# Patient Record
Sex: MALE | Race: WHITE | Employment: OTHER | URBAN - METROPOLITAN AREA
[De-identification: names, ages, dates, MRNs, and addresses within clinical notes are randomized per-mention and may not be internally consistent; named-entity substitution may affect disease eponyms.]

---

## 2017-01-23 ENCOUNTER — OFFICE VISIT (OUTPATIENT)
Dept: CARDIOLOGY CLINIC | Age: 82
End: 2017-01-23

## 2017-01-23 VITALS
DIASTOLIC BLOOD PRESSURE: 84 MMHG | RESPIRATION RATE: 16 BRPM | HEART RATE: 62 BPM | SYSTOLIC BLOOD PRESSURE: 140 MMHG | BODY MASS INDEX: 26.25 KG/M2 | WEIGHT: 177.2 LBS | HEIGHT: 69 IN | OXYGEN SATURATION: 96 %

## 2017-01-23 DIAGNOSIS — I48.20 CHRONIC ATRIAL FIBRILLATION (HCC): Primary | ICD-10-CM

## 2017-01-23 NOTE — PROGRESS NOTES
Chief Complaint   Patient presents with    Irregular Heart Beat     6 month follow up. Denies chest pain/swelling.   Occasional dypsnea on exertion

## 2017-01-23 NOTE — PROGRESS NOTES
HISTORY OF PRESENT ILLNESS  Wilma Hammond III is a 80 y.o. male. with a past medical history remarkable for prostate cancer diagnosed approximately in [address] [place] [address], for which he has undergone prostatectomy, radiation therapy at the level of the pelvis. He also has a history of left breast cancer, for which he has undergone left mastectomy in 2004, chest radiation and chemotherapy. He denies any previous history of hypertension, hyperlipidemia, diabetes or coronary artery disease. He was seen in the hospital at [facility] in May 2008 for dizziness. He was found to have atrial fibrillation. On account of that, he underwent JOHN followed by DC cardioversion. To be noted that the ejection fraction was estimated at 60% at that time. He only had trace to mild mitral regurgitation and tricuspid regurgitation. He converted back to normal sinus rhythm promptly after cardioversion. He has had recurrence of atrial fibrillation and has persistent atrial fibrillation. Echo on 4/64: Systolic function was normal. Ejection fraction was  estimated in the range of 60 % to 65 %. There were no regional wall motion  abnormalities. Left atrium: The atrium was mildly dilated. Mitral valve: There was mild annular calcification. Tricuspid valve: There was mild regurgitation.     He presents today for follow up. HPI  No complaints except hematuria evaluated in [place] where he lives during the winter  No cp or sob   No recurrent dizziness  Review of Systems   Respiratory: Negative. Cardiovascular: Negative. Visit Vitals    /84 (BP 1 Location: Right arm, BP Patient Position: Sitting)    Pulse 62    Resp 16    Ht 5' 8.5\" (1.74 m)    Wt 80.4 kg (177 lb 3.2 oz)    SpO2 96%    BMI 26.55 kg/m2       Physical Exam   Neck: No JVD present. Carotid bruit is not present. Cardiovascular: Normal rate. An irregularly irregular rhythm present. Pulmonary/Chest: Effort normal and breath sounds normal.   Abdominal: Soft. Musculoskeletal: He exhibits no edema. Psychiatric: He has a normal mood and affect. Current Outpatient Prescriptions on File Prior to Visit   Medication Sig Dispense Refill    tamoxifen (NOLVADEX) 20 mg tablet Take 20 mg by mouth daily.  apixaban (ELIQUIS) 5 mg tablet Take 5 mg by mouth two (2) times a day.  CYANOCOBALAMIN, VITAMIN B-12, (VITAMIN B-12 PO) Take  by mouth daily.  digoxin (LANOXIN) 0.25 mg tablet TAKE 1 TABLET BY MOUTH EVERY DAY 90 Tab 3    cholecalciferol (VITAMIN D3) 1,000 unit tablet Take 400 Units by mouth daily.  MULTIVITAMIN (MULTIPLE VITAMINS PO) Take  by mouth. No current facility-administered medications on file prior to visit. ASSESSMENT and PLAN  AF: persistent , rate is controlled on digoxin. nNormal EF by echo of 9/14. stress test on the back burner for now in this asymptomatic patient  HTN: well controlled in this office. His log reviewed and it is a bit elevated 150-160, his mow on eliquis since very difficult to control his inr on digoxin.  Episode of hematuria several weeks ago of concern but apparently w/u in [place] was normal. Obtain bmp to make sure no changes in eliquis dose needed  Also obtain digoxin level  His ecg shows AF and nstt and no changes from previous  HTN: reasonably controlled at this time  no further issues with low BP and dizziness, drinking daily gatorades   HLD: closely followed by her PCP  See him back in 6 months or sooner if any issues and will call labs results by phone he is in fact going back to [place] for the winter  Hold off echo on this asymptomatic patient

## 2017-01-24 ENCOUNTER — HOSPITAL ENCOUNTER (OUTPATIENT)
Dept: LAB | Age: 82
Discharge: HOME | End: 2017-01-24
Payer: MEDICARE

## 2017-01-24 PROCEDURE — 80162 ASSAY OF DIGOXIN TOTAL: CPT

## 2017-01-24 PROCEDURE — 80048 BASIC METABOLIC PNL TOTAL CA: CPT

## 2017-01-24 PROCEDURE — 36415 COLL VENOUS BLD VENIPUNCTURE: CPT

## 2017-01-25 LAB
BUN SERPL-MCNC: 14 MG/DL (ref 8–27)
BUN/CREAT SERPL: 12 (ref 10–22)
CALCIUM SERPL-MCNC: 8.8 MG/DL (ref 8.6–10.2)
CHLORIDE SERPL-SCNC: 103 MMOL/L (ref 96–106)
CO2 SERPL-SCNC: 24 MMOL/L (ref 18–29)
CREAT SERPL-MCNC: 1.13 MG/DL (ref 0.76–1.27)
DIGOXIN SERPL-MCNC: 0.5 NG/ML
GLUCOSE SERPL-MCNC: 98 MG/DL (ref 65–99)
INTERPRETATION: NORMAL
POTASSIUM SERPL-SCNC: 4.6 MMOL/L (ref 3.5–5.2)
SODIUM SERPL-SCNC: 143 MMOL/L (ref 134–144)

## 2017-01-27 ENCOUNTER — TELEPHONE (OUTPATIENT)
Dept: CARDIOLOGY CLINIC | Age: 82
End: 2017-01-27

## 2017-01-27 NOTE — TELEPHONE ENCOUNTER
Verified patient with two patient identifiers. Spoke with patient lab results given. Per Kaleigh Dorsey continue same RX.

## 2017-04-28 ENCOUNTER — HOSPITAL ENCOUNTER (OUTPATIENT)
Dept: PET IMAGING | Age: 82
Discharge: HOME | End: 2017-04-28
Attending: INTERNAL MEDICINE
Payer: MEDICARE

## 2017-04-28 VITALS — BODY MASS INDEX: 25.76 KG/M2 | WEIGHT: 170 LBS | HEIGHT: 68 IN

## 2017-04-28 DIAGNOSIS — C50.929 MALIGNANT NEOPLASM OF MALE BREAST (HCC): ICD-10-CM

## 2017-04-28 PROCEDURE — A9552 F18 FDG: HCPCS

## 2017-04-28 RX ORDER — SODIUM CHLORIDE 0.9 % (FLUSH) 0.9 %
10 SYRINGE (ML) INJECTION
Status: COMPLETED | OUTPATIENT
Start: 2017-04-28 | End: 2017-04-28

## 2017-04-28 RX ADMIN — Medication 10 ML: at 10:45

## 2017-05-01 ENCOUNTER — TELEPHONE (OUTPATIENT)
Dept: CARDIOLOGY CLINIC | Age: 82
End: 2017-05-01

## 2017-05-01 NOTE — TELEPHONE ENCOUNTER
Prior Authorization obtained for digoxin 0.25mg daily. Per [place], approved for 1 year, May 1-2017 to May 1, 2018. Case # [id number]. Informed Madelyn at [facility]. Spoke to patient. Identifiers x 2. Informed that prescription has been approved.

## 2017-07-21 ENCOUNTER — OFFICE VISIT (OUTPATIENT)
Dept: CARDIOLOGY CLINIC | Age: 82
End: 2017-07-21

## 2017-07-21 VITALS
HEIGHT: 68 IN | RESPIRATION RATE: 20 BRPM | DIASTOLIC BLOOD PRESSURE: 68 MMHG | WEIGHT: 174 LBS | HEART RATE: 72 BPM | BODY MASS INDEX: 26.37 KG/M2 | SYSTOLIC BLOOD PRESSURE: 120 MMHG | OXYGEN SATURATION: 94 %

## 2017-07-21 DIAGNOSIS — I48.20 CHRONIC ATRIAL FIBRILLATION (HCC): Primary | ICD-10-CM

## 2017-07-21 NOTE — PROGRESS NOTES
Patient is here for f/u on AFIB.   Visit Vitals    /68 (BP 1 Location: Right arm, BP Patient Position: Supine)    Pulse 72    Resp 20    Ht 5' 8\" (1.727 m)    Wt 174 lb (78.9 kg)    SpO2 94%    BMI 26.46 kg/m2

## 2017-07-21 NOTE — MR AVS SNAPSHOT
Visit Information Date & Time Provider Department Dept. Phone Encounter #  
 7/21/2017  2:00 PM Shirley Luna MD [facility] Lorene Acosta [phone] [id number] Follow-up Instructions Return in about 6 months (around 1/21/2018). Follow-up and Disposition History Upcoming Health Maintenance Date Due DTaP/Tdap/Td series (1 - Tdap) 6/5/1953 GLAUCOMA SCREENING Q2Y 6/5/1997 MEDICARE YEARLY EXAM 11/6/2015 INFLUENZA AGE 9 TO ADULT 8/1/2017 Allergies as of 7/21/2017  Review Complete On: 7/21/2017 By: Shirley Luna MD  
 No Known Allergies Current Immunizations  Reviewed on 11/16/2016 Name Date Influenza High Dose Vaccine PF 11/3/2016 Influenza Vaccine 10/7/2013 Influenza Vaccine Split 10/7/2011 Pneumococcal Conjugate (PCV-13) 11/27/2015 Pneumococcal Vaccine (Unspecified Type) 11/13/2003, 4/29/1998 Not reviewed this visit You Were Diagnosed With   
  
 Codes Comments Chronic atrial fibrillation (HCC)    -  Primary ICD-10-CM: L93.0 ICD-9-CM: 427.31 Vitals BP Pulse Resp Height(growth percentile) Weight(growth percentile) SpO2  
 120/68 (BP 1 Location: Right arm, BP Patient Position: Supine) 72 20 5' 8\" (1.727 m) 174 lb (78.9 kg) 94% BMI Smoking Status 26.46 kg/m2 Former Smoker Vitals History BMI and BSA Data Body Mass Index Body Surface Area  
 26.46 kg/m 2 1.95 m 2 Preferred Pharmacy Pharmacy Name Phone [facility] [address], [address] [place] [address] [place] [facility] [address] [place] [address] Road [phone] Your Updated Medication List  
  
   
This list is accurate as of: 7/21/17  2:59 PM.  Always use your most recent med list.  
  
  
  
  
 * digoxin 0.25 mg tablet Commonly known as:  LANOXIN  
TAKE 1 TABLET BY MOUTH EVERY DAY  
  
 * DIGOX 0.25 mg tablet Generic drug:  digoxin TAKE 1 TABLET BY MOUTH DAILY * ELIQUIS 5 mg tablet Generic drug:  apixaban Take 5 mg by mouth two (2) times a day. * ELIQUIS 5 mg tablet Generic drug:  apixaban TAKE 1 TABLET BY MOUTH TWICE DAILY MULTIPLE VITAMINS PO Take  by mouth. tamoxifen 20 mg tablet Commonly known as:  NOLVADEX Take 20 mg by mouth daily. VITAMIN B-12 PO Take  by mouth daily. VITAMIN D3 1,000 unit tablet Generic drug:  cholecalciferol Take 400 Units by mouth daily. * Notice: This list has 4 medication(s) that are the same as other medications prescribed for you. Read the directions carefully, and ask your doctor or other care provider to review them with you. We Performed the Following AMB POC EKG ROUTINE W/ 12 LEADS, INTER & REP [31665 CPT(R)] AMB POC EKG ROUTINE W/ 12 LEADS, INTER & REP [11404 CPT(R)] Follow-up Instructions Return in about 6 months (around 1/21/2018). Patient Instructions Follow up with Dr. Sienna Go in 6 months. Cardiac clearance note has been sent to urologist.   
 
 
  
Introducing \[facility]\"" & HEALTH SERVICES! Chrissie Gomez introduces [employer] patient portal. Now you can access parts of your medical record, email your doctor's office, and request medication refills online. 1. In your internet browser, go to [email or web address] 2. Click on the First Time User? Click Here link in the Sign In box. You will see the New Member Sign Up page. 3. Enter your [employer] Access Code exactly as it appears below. You will not need to use this code after youve completed the sign-up process. If you do not sign up before the expiration date, you must request a new code. · [employer] Access Code: [email or web address] Expires: 7/27/2017 10:08 AM 
 
4. Enter the last four digits of your Social Security Number (xxxx) and Date of Birth (mm/dd/yyyy) as indicated and click Submit. You will be taken to the next sign-up page. 5. Create a [employer] ID.  This will be your [employer] login ID and cannot be changed, so think of one that is secure and easy to remember. 6. Create a [employer] password. You can change your password at any time. 7. Enter your Password Reset Question and Answer. This can be used at a later time if you forget your password. 8. Enter your e-mail address. You will receive e-mail notification when new information is available in [address] 9. Click Sign Up. You can now view and download portions of your medical record. 10. Click the Download Summary menu link to download a portable copy of your medical information. If you have questions, please visit the Frequently Asked Questions section of the [employer] website. Remember, [employer] is NOT to be used for urgent needs. For medical emergencies, dial 911. Now available from your iPhone and Android! Please provide this summary of care documentation to your next provider. Your primary care clinician is listed as Maame Cardoso. If you have any questions after today's visit, please call [phone].

## 2017-07-21 NOTE — PROGRESS NOTES
HISTORY OF PRESENT ILLNESS  Rajat Thomas is a 80 y.o. male. with a past medical history remarkable for prostate cancer diagnosed approximately in [address] [place] [address], for which he has undergone prostatectomy, radiation therapy at the level of the pelvis. He also has a history of left breast cancer, for which he has undergone left mastectomy in 2004, chest radiation and chemotherapy. He denies any previous history of hypertension, hyperlipidemia, diabetes or coronary artery disease. He was seen in the hospital at [facility] in May 2008 for dizziness. He was found to have atrial fibrillation. On account of that, he underwent JOHN followed by DC cardioversion. To be noted that the ejection fraction was estimated at 60% at that time. He only had trace to mild mitral regurgitation and tricuspid regurgitation. He converted back to normal sinus rhythm promptly after cardioversion. He has had recurrence of atrial fibrillation and has persistent atrial fibrillation. Echo on 1/81: Systolic function was normal. Ejection fraction was  estimated in the range of 60 % to 65 %. There were no regional wall motion  abnormalities. Left atrium: The atrium was mildly dilated. Mitral valve: There was mild annular calcification. Tricuspid valve: There was mild regurgitation.   Past Medical History:   Diagnosis Date    Atrial fibrillation ([address]     Breast cancer ([address] 7/26/2011    Breast cancer, male ([facility] 7/5/2012    Colon polyps 7/26/2011    Glucose intolerance (impaired glucose tolerance) 10/31/2013    Prostate cancer ([address] 7/26/2011    Typhoid fever 7/26/2011     Past Surgical History:   Procedure Laterality Date    HX CATARACT REMOVAL      HX MASTECTOMY      HX POLYPECTOMY      HX PROSTATECTOMY      HX TONSILLECTOMY       Social History   Substance Use Topics    Smoking status: Former Smoker     Packs/day: 2.00    Smokeless tobacco: Never Used    Alcohol use 3.5 oz/week     7 drink(s) per week      Comment: A drink at night       HPI  Doing well cardiac wise still some hematuria reported and now scheduled for cystoscopy  No cp or sob reported  Review of Systems   Constitutional: Negative. Respiratory: Negative. Cardiovascular: Negative. Genitourinary: Positive for hematuria. Visit Vitals    /68 (BP 1 Location: Right arm, BP Patient Position: Supine)    Pulse 72    Resp 20    Ht 5' 8\" (1.727 m)    Wt 78.9 kg (174 lb)    SpO2 94%    BMI 26.46 kg/m2       Physical Exam   Neck: No JVD present. Carotid bruit is not present. Cardiovascular: Normal rate. An irregularly irregular rhythm present. Pulmonary/Chest: Effort normal and breath sounds normal.   Abdominal: Soft. Musculoskeletal: He exhibits no edema. Psychiatric: He has a normal mood and affect. Current Outpatient Prescriptions on File Prior to Visit   Medication Sig Dispense Refill    ELIQUIS 5 mg tablet TAKE 1 TABLET BY MOUTH TWICE DAILY 60 Tab 6    tamoxifen (NOLVADEX) 20 mg tablet Take 20 mg by mouth daily.  CYANOCOBALAMIN, VITAMIN B-12, (VITAMIN B-12 PO) Take  by mouth daily.  digoxin (LANOXIN) 0.25 mg tablet TAKE 1 TABLET BY MOUTH EVERY DAY 90 Tab 3    cholecalciferol (VITAMIN D3) 1,000 unit tablet Take 400 Units by mouth daily.  MULTIVITAMIN (MULTIPLE VITAMINS PO) Take  by mouth.  DIGOX 250 mcg tablet TAKE 1 TABLET BY MOUTH DAILY 90 Tab 3    apixaban (ELIQUIS) 5 mg tablet Take 5 mg by mouth two (2) times a day. No current facility-administered medications on file prior to visit.       Lab Results   Component Value Date/Time    Sodium 143 01/24/2017 08:29 AM    Potassium 4.6 01/24/2017 08:29 AM    Chloride 103 01/24/2017 08:29 AM    CO2 24 01/24/2017 08:29 AM    Glucose 98 01/24/2017 08:29 AM    BUN 14 01/24/2017 08:29 AM    Creatinine 1.13 01/24/2017 08:29 AM    BUN/Creatinine ratio 12 01/24/2017 08:29 AM    GFR est AA 69 01/24/2017 08:29 AM    GFR est non-AA 59 01/24/2017 08:29 AM    Calcium 8.8 01/24/2017 08:29 AM       ASSESSMENT and PLAN  AF: persistent , rate is controlled on digoxin. Normal EF by echo of 9/14. stress test and echo  on the back burner for now in this asymptomatic patient  HTN: well controlled in this office.  Also obtain digoxin level  His ecg shows AF and nstt and no changes from previous  HTN: reasonably controlled at this time no further issues with low BP and dizziness, drinking daily gatorades   HLD: closely followed by her PCP  Hematuria: patient is at an acceptable cardiac risk to proceed with cystoscopy as planned may stop eliquis for 48 hours prior to procedure for then resuming it soon after or next day  AAA: 3.2 cm aaa by ct scan will need re checking in 6 months  Obtain bmp and digoxin level at the next OV  See him back in 6 months

## 2017-08-19 ENCOUNTER — HOSPITAL ENCOUNTER (EMERGENCY)
Age: 82
Discharge: HOME | End: 2017-08-19
Attending: EMERGENCY MEDICINE
Payer: MEDICARE

## 2017-08-19 VITALS
SYSTOLIC BLOOD PRESSURE: 155 MMHG | RESPIRATION RATE: 18 BRPM | WEIGHT: 174.4 LBS | HEIGHT: 68 IN | BODY MASS INDEX: 26.43 KG/M2 | TEMPERATURE: 98.1 F | HEART RATE: 80 BPM | DIASTOLIC BLOOD PRESSURE: 86 MMHG | OXYGEN SATURATION: 96 %

## 2017-08-19 DIAGNOSIS — R31.9 HEMATURIA: Primary | ICD-10-CM

## 2017-08-19 LAB
ALBUMIN SERPL-MCNC: 3.8 G/DL (ref 3.5–5)
ALBUMIN/GLOB SERPL: 1.2 {RATIO} (ref 1.1–2.2)
ALP SERPL-CCNC: 48 U/L (ref 45–117)
ALT SERPL-CCNC: 18 U/L (ref 12–78)
ANION GAP SERPL CALC-SCNC: 8 MMOL/L (ref 5–15)
APPEARANCE UR: ABNORMAL
AST SERPL-CCNC: 11 U/L (ref 15–37)
BACTERIA URNS QL MICRO: ABNORMAL /HPF
BASOPHILS # BLD: 0 K/UL (ref 0–0.1)
BASOPHILS NFR BLD: 0 % (ref 0–1)
BILIRUB SERPL-MCNC: 0.6 MG/DL (ref 0.2–1)
BILIRUB UR QL: NEGATIVE
BUN SERPL-MCNC: 19 MG/DL (ref 6–20)
BUN/CREAT SERPL: 17 (ref 12–20)
CALCIUM SERPL-MCNC: 8.3 MG/DL (ref 8.5–10.1)
CHLORIDE SERPL-SCNC: 105 MMOL/L (ref 97–108)
CO2 SERPL-SCNC: 28 MMOL/L (ref 21–32)
COLOR UR: ABNORMAL
CREAT SERPL-MCNC: 1.1 MG/DL (ref 0.7–1.3)
DIFFERENTIAL METHOD BLD: ABNORMAL
EOSINOPHIL # BLD: 0.1 K/UL (ref 0–0.4)
EOSINOPHIL NFR BLD: 3 % (ref 0–7)
EPITH CASTS URNS QL MICRO: ABNORMAL /LPF
ERYTHROCYTE [DISTWIDTH] IN BLOOD BY AUTOMATED COUNT: 13.1 % (ref 11.5–14.5)
GLOBULIN SER CALC-MCNC: 3.1 G/DL (ref 2–4)
GLUCOSE SERPL-MCNC: 99 MG/DL (ref 65–100)
GLUCOSE UR STRIP.AUTO-MCNC: NEGATIVE MG/DL
HCT VFR BLD AUTO: 42.1 % (ref 36.6–50.3)
HGB BLD-MCNC: 14.4 G/DL (ref 12.1–17)
HGB UR QL STRIP: ABNORMAL
KETONES UR QL STRIP.AUTO: NEGATIVE MG/DL
LEUKOCYTE ESTERASE UR QL STRIP.AUTO: ABNORMAL
LYMPHOCYTES # BLD: 0.7 K/UL (ref 0.8–3.5)
LYMPHOCYTES NFR BLD: 21 % (ref 12–49)
MCH RBC QN AUTO: 37.2 PG (ref 26–34)
MCHC RBC AUTO-ENTMCNC: 34.2 G/DL (ref 30–36.5)
MCV RBC AUTO: 108.8 FL (ref 80–99)
MONOCYTES # BLD: 0.5 K/UL (ref 0–1)
MONOCYTES NFR BLD: 15 % (ref 5–13)
NEUTS SEG # BLD: 2.1 K/UL (ref 1.8–8)
NEUTS SEG NFR BLD: 61 % (ref 32–75)
NITRITE UR QL STRIP.AUTO: NEGATIVE
PH UR STRIP: 6 [PH] (ref 5–8)
PLATELET # BLD AUTO: 129 K/UL (ref 150–400)
POTASSIUM SERPL-SCNC: 4.2 MMOL/L (ref 3.5–5.1)
PROT SERPL-MCNC: 6.9 G/DL (ref 6.4–8.2)
PROT UR STRIP-MCNC: 300 MG/DL
RBC # BLD AUTO: 3.87 M/UL (ref 4.1–5.7)
RBC #/AREA URNS HPF: >100 /HPF (ref 0–5)
RBC MORPH BLD: ABNORMAL
RBC MORPH BLD: ABNORMAL
SODIUM SERPL-SCNC: 141 MMOL/L (ref 136–145)
SP GR UR REFRACTOMETRY: 1.01 (ref 1–1.03)
UROBILINOGEN UR QL STRIP.AUTO: 0.2 EU/DL (ref 0.2–1)
WBC # BLD AUTO: 3.4 K/UL (ref 4.1–11.1)
WBC URNS QL MICRO: ABNORMAL /HPF (ref 0–4)

## 2017-08-19 PROCEDURE — 80053 COMPREHEN METABOLIC PANEL: CPT | Performed by: EMERGENCY MEDICINE

## 2017-08-19 PROCEDURE — 51700 IRRIGATION OF BLADDER: CPT

## 2017-08-19 PROCEDURE — 99283 EMERGENCY DEPT VISIT LOW MDM: CPT

## 2017-08-19 PROCEDURE — 74011250637 HC RX REV CODE- 250/637: Performed by: EMERGENCY MEDICINE

## 2017-08-19 PROCEDURE — 74011000250 HC RX REV CODE- 250: Performed by: EMERGENCY MEDICINE

## 2017-08-19 PROCEDURE — 36415 COLL VENOUS BLD VENIPUNCTURE: CPT | Performed by: EMERGENCY MEDICINE

## 2017-08-19 PROCEDURE — 77030005546 HC CATH URETH FOL 3W BARD -A

## 2017-08-19 PROCEDURE — 51702 INSERT TEMP BLADDER CATH: CPT

## 2017-08-19 PROCEDURE — 81001 URINALYSIS AUTO W/SCOPE: CPT | Performed by: EMERGENCY MEDICINE

## 2017-08-19 PROCEDURE — 85025 COMPLETE CBC W/AUTO DIFF WBC: CPT | Performed by: EMERGENCY MEDICINE

## 2017-08-19 RX ORDER — ACETAMINOPHEN 325 MG/1
650 TABLET ORAL ONCE
Status: COMPLETED | OUTPATIENT
Start: 2017-08-19 | End: 2017-08-19

## 2017-08-19 RX ORDER — LIDOCAINE HYDROCHLORIDE 20 MG/ML
JELLY TOPICAL ONCE
Status: COMPLETED | OUTPATIENT
Start: 2017-08-19 | End: 2017-08-19

## 2017-08-19 RX ADMIN — LIDOCAINE HYDROCHLORIDE: 20 JELLY TOPICAL at 07:56

## 2017-08-19 RX ADMIN — ACETAMINOPHEN 650 MG: 325 TABLET, FILM COATED ORAL at 07:56

## 2017-08-19 NOTE — ED PROVIDER NOTES
HPI Comments: 59-year-old male presents with complaints of gross hematuria starting at 4 AM. Patient is 4 days status post cystoscopy and biopsy by Dr. Carlos Fernandez. Patient is on eliquis. Denies bleeding elsewhere. Denies fever, chills. Denies abdominal pain chest pain, shortness of breath. Denies trouble with urination. Primary care physician-desirae  Urologist-Osmani  Former smoker  Regular alcohol use  Denies drug use    The history is provided by the patient. Past Medical History:   Diagnosis Date    Atrial fibrillation ([facility])     Breast cancer ([facility]) 7/26/2011    Breast cancer, male ([facility]) 7/5/2012    Colon polyps 7/26/2011    Glucose intolerance (impaired glucose tolerance) 10/31/2013    Prostate cancer ([facility]) 7/26/2011    Typhoid fever 7/26/2011       Past Surgical History:   Procedure Laterality Date    HX CATARACT REMOVAL      HX MASTECTOMY      HX POLYPECTOMY      HX PROSTATECTOMY      HX TONSILLECTOMY           History reviewed. No pertinent family history. Social History     Social History    Marital status:      Spouse name: N/A    Number of children: N/A    Years of education: N/A     Occupational History    Not on file. Social History Main Topics    Smoking status: Former Smoker     Packs/day: 2.00    Smokeless tobacco: Never Used    Alcohol use 3.5 oz/week     7 drink(s) per week      Comment: A drink at night    Drug use: No    Sexual activity: Not on file     Other Topics Concern    Not on file     Social History Narrative         ALLERGIES: Review of patient's allergies indicates no known allergies. Review of Systems   Constitutional: Negative for chills and fever. HENT: Negative for congestion, nosebleeds and sore throat. Eyes: Negative for pain and discharge. Respiratory: Negative for cough and shortness of breath. Cardiovascular: Negative for chest pain and palpitations.    Gastrointestinal: Negative for abdominal pain, constipation, nausea and vomiting. Genitourinary: Positive for dysuria and hematuria. Negative for decreased urine volume, flank pain and urgency. Musculoskeletal: Negative for gait problem and myalgias. Skin: Negative for rash and wound. Neurological: Negative for seizures and syncope. Hematological: Does not bruise/bleed easily. Psychiatric/Behavioral: Negative for confusion, self-injury and suicidal ideas. Vitals:    08/19/17 0531 08/19/17 0603   BP: (!) 169/94 152/74   Pulse: 81    Resp: 18    Temp: 98 °F (36.7 °C)    SpO2: 99%    Weight: 79.1 kg (174 lb 6.4 oz)    Height: 5' 8\" (1.727 m)             Physical Exam   Constitutional: He is oriented to person, place, and time. He appears well-developed and well-nourished. HENT:   Head: Normocephalic and atraumatic. Eyes: EOM are normal. Pupils are equal, round, and reactive to light. Neck: Normal range of motion. Neck supple. Cardiovascular: Normal rate, regular rhythm, normal heart sounds and intact distal pulses. Pulmonary/Chest: Effort normal and breath sounds normal. No respiratory distress. He has no wheezes. Abdominal: Soft. Bowel sounds are normal. There is no tenderness. There is no rebound and no guarding. Musculoskeletal: Normal range of motion. Neurological: He is alert and oriented to person, place, and time. Skin: Skin is warm and dry. Psychiatric: He has a normal mood and affect. His behavior is normal.   Nursing note and vitals reviewed. MDM  Number of Diagnoses or Management Options  Hematuria:   Diagnosis management comments: 80-year-old male with history of prostate cancer presents with complaints of gross hematuria 4 days status post bladder biopsy. Patient is well-appearing, in no acute distress, hemodynamically stable, afebrile. Plan-bladder irrigation, CBC/CMP/UA.     Labs unremarkable       Amount and/or Complexity of Data Reviewed  Clinical lab tests: ordered and reviewed  Discuss the patient with other providers: yes    Risk of Complications, Morbidity, and/or Mortality  Presenting problems: moderate  Diagnostic procedures: moderate  Management options: moderate    Patient Progress  Patient progress: improved    ED Course       Procedures     0700  Bleeding resolved on placement of bush and irrigation. 7:29 AM  Ivan Salmeron MD spoke with Dr. Rebekah Downing, Consult for Urology. Discussed available diagnostic tests and clinical findings. He/She is in agreement with care plans as outlined. He/she advises dc with catheter in place and follow up in office this morning for re-eval.      Patient's results have been reviewed with them. Patient and/or family have verbally conveyed their understanding and agreement of the patient's signs, symptoms, diagnosis, treatment and prognosis and additionally agree to follow up as recommended or return to the Emergency Room should their condition change prior to follow-up. Discharge instructions have also been provided to the patient with some educational information regarding their diagnosis as well a list of reasons why they would want to return to the ER prior to their follow-up appointment should their condition change.

## 2017-08-19 NOTE — ED NOTES
Inserted 3 way bush to irrigate bladder. Patient draining light pink/yellow urine. Small clots noted.

## 2017-08-19 NOTE — DISCHARGE INSTRUCTIONS
Blood in the Urine: Care Instructions  Your Care Instructions  Blood in the urine, or hematuria, may make the urine look red, brown, or pink. There may be blood every time you urinate or just from time to time. You cannot always see blood in the urine, but it will show up in a urine test.  Blood in the urine may be serious. It should always be checked by a doctor. Your doctor may recommend more tests, including an X-ray, a CT scan, or a cystoscopy (which lets a doctor look inside the urethra and bladder). Blood in the urine can be a sign of another problem. Common causes are bladder infections and kidney stones. An injury to your groin or your genital area can also cause bleeding in the urinary tract. Very hard exercise--such as running a marathon--can cause blood in the urine. Blood in the urine can also be a sign of kidney disease or cancer in the bladder or kidney. Many cases of blood in the urine are caused by a harmless condition that runs in families. This is called benign familial hematuria. It does not need any treatment. Sometimes your urine may look red or brown even though it does not contain blood. For example, not getting enough fluids (dehydration), taking certain medicines, or having a liver problem can change the color of your urine. Eating foods such as beets, rhubarb, or blackberries or foods with red food coloring can make your urine look red or pink. Follow-up care is a key part of your treatment and safety. Be sure to make and go to all appointments, and call your doctor if you are having problems. It's also a good idea to know your test results and keep a list of the medicines you take. When should you call for help? Call your doctor now or seek immediate medical care if:  · You have symptoms of a urinary infection. For example:  ¨ You have pus in your urine. ¨ You have pain in your back just below your rib cage. This is called flank pain.   ¨ You have a fever, chills, or body aches.  ¨ It hurts to urinate. ¨ You have groin or belly pain. · You have more blood in your urine. Watch closely for changes in your health, and be sure to contact your doctor if:  · You have new urination problems. · You do not get better as expected. Where can you learn more? Go to http://héctor-parker.info/. Enter A659 in the search box to learn more about \"Blood in the Urine: Care Instructions. \"  Current as of: March 20, 2017  Content Version: 11.3  © 5047-2161 [employer]. Care instructions adapted under license by [employer] (which disclaims liability or warranty for this information). If you have questions about a medical condition or this instruction, always ask your healthcare professional. [place] [address] any warranty or liability for your use of this information. We hope that we have addressed all of your medical concerns. The examination and treatment you received in the Emergency Department were for an emergent problem and were not intended as complete care. It is important that you follow up with your healthcare provider(s) for ongoing care. If your symptoms worsen or do not improve as expected, and you are unable to reach your usual health care provider(s), you should return to the Emergency Department. Today's healthcare is undergoing tremendous change, and patient satisfaction surveys are one of the many tools to assess the quality of medical care. You may receive a survey from the [employer] organization regarding your experience in the Emergency Department. I hope that your experience has been completely positive, particularly the medical care that I provided. As such, please participate in the survey; anything less than excellent does not meet my expectations or intentions. [address] [facility] and [address] [place] [address] participate in nationally recognized quality of care measures.   If your blood pressure is greater than 120/80, as reported below, we urge that you seek medical care to address the potential of high blood pressure, commonly known as hypertension. Hypertension can be hereditary or can be caused by certain medical conditions, pain, stress, or \"white coat syndrome. \"       Please make an appointment with your health care provider(s) for follow up of your Emergency Department visit. VITALS:   Patient Vitals for the past 8 hrs:   Temp Pulse Resp BP SpO2   08/19/17 0730 98.1 °F (36.7 °C) 80 18 155/86 96 %   08/19/17 0603 - - - 152/74 -   08/19/17 0531 98 °F (36.7 °C) 81 18 (!) 169/94 99 %          Thank you for allowing us to provide you with medical care today. We realize that you have many choices for your emergency care needs. Please choose us in the future for any continued health care needs. Debbie Fuentes  [address]   Office: [phone]            Recent Results (from the past 24 hour(s))   URINALYSIS W/ RFLX MICROSCOPIC    Collection Time: 08/19/17  6:03 AM   Result Value Ref Range    Color RED      Appearance BLOODY (A) CLEAR      Specific gravity 1.015 1.003 - 1.030      pH (UA) 6.0 5.0 - 8.0      Protein 300 (A) NEG mg/dL    Glucose NEGATIVE  NEG mg/dL    Ketone NEGATIVE  NEG mg/dL    Bilirubin NEGATIVE  NEG      Blood LARGE (A) NEG      Urobilinogen 0.2 0.2 - 1.0 EU/dL    Nitrites NEGATIVE  NEG      Leukocyte Esterase TRACE (A) NEG     CBC WITH AUTOMATED DIFF    Collection Time: 08/19/17  6:03 AM   Result Value Ref Range    WBC 3.4 (L) 4.1 - 11.1 K/uL    RBC 3.87 (L) 4.10 - 5.70 M/uL    HGB 14.4 12.1 - 17.0 g/dL    HCT 42.1 36.6 - 50.3 %    .8 (H) 80.0 - 99.0 FL    MCH 37.2 (H) 26.0 - 34.0 PG    MCHC 34.2 30.0 - 36.5 g/dL    RDW 13.1 11.5 - 14.5 %    PLATELET 740 (L) 494 - 400 K/uL    NEUTROPHILS 61 32 - 75 %    LYMPHOCYTES 21 12 - 49 %    MONOCYTES 15 (H) 5 - 13 %    EOSINOPHILS 3 0 - 7 %    BASOPHILS 0 0 - 1 %    ABS. NEUTROPHILS 2.1 1.8 - 8.0 K/UL    ABS. LYMPHOCYTES 0.7 (L) 0.8 - 3.5 K/UL    ABS. MONOCYTES 0.5 0.0 - 1.0 K/UL    ABS. EOSINOPHILS 0.1 0.0 - 0.4 K/UL    ABS. BASOPHILS 0.0 0.0 - 0.1 K/UL    DF SMEAR SCANNED      RBC COMMENTS MACROCYTOSIS  1+        RBC COMMENTS OVALOCYTES  PRESENT       METABOLIC PANEL, COMPREHENSIVE    Collection Time: 08/19/17  6:03 AM   Result Value Ref Range    Sodium 141 136 - 145 mmol/L    Potassium 4.2 3.5 - 5.1 mmol/L    Chloride 105 97 - 108 mmol/L    CO2 28 21 - 32 mmol/L    Anion gap 8 5 - 15 mmol/L    Glucose 99 65 - 100 mg/dL    BUN 19 6 - 20 MG/DL    Creatinine 1.10 0.70 - 1.30 MG/DL    BUN/Creatinine ratio 17 12 - 20      GFR est AA >60 >60 ml/min/1.73m2    GFR est non-AA >60 >60 ml/min/1.73m2    Calcium 8.3 (L) 8.5 - 10.1 MG/DL    Bilirubin, total 0.6 0.2 - 1.0 MG/DL    ALT (SGPT) 18 12 - 78 U/L    AST (SGOT) 11 (L) 15 - 37 U/L    Alk. phosphatase 48 45 - 117 U/L    Protein, total 6.9 6.4 - 8.2 g/dL    Albumin 3.8 3.5 - 5.0 g/dL    Globulin 3.1 2.0 - 4.0 g/dL    A-G Ratio 1.2 1.1 - 2.2     URINE MICROSCOPIC ONLY    Collection Time: 08/19/17  6:03 AM   Result Value Ref Range    WBC 5-10 0 - 4 /hpf    RBC >100 (H) 0 - 5 /hpf    Epithelial cells FEW FEW /lpf    Bacteria 1+ (A) NEG /hpf       No results found. Learning About Urinary Catheter Care to Prevent Infection  What is a urinary catheter? A urinary catheter is a flexible plastic tube used to drain urine from your bladder when you can't urinate on your own. The catheter allows urine to drain from the bladder into a bag. Two types of drainage bags may be used with a urinary catheter. · A bedside bag is a large bag that you can hang on the side of your bed or on a chair. You can use it overnight or anytime you will be sitting or lying down for a long time. · A leg bag is a small bag that you can use during the day. It is usually attached to your thigh or calf and hidden under your clothes.   Having a urinary catheter increases your risk of getting a urinary tract infection. Germs may get on the catheter and cause an infection in your bladder or kidneys. The longer you have a catheter, the more likely it is that you will get an infection. You can help prevent this problem with good hygiene and careful handling of your catheter and drainage bags. How can you help prevent infection? Take care to be clean  · Always wash your hands well before and after you handle your catheter. · Clean the skin around the catheter twice a day using soap and water. Dry with a clean towel afterward. You can shower with your catheter and drainage bag in place unless your doctor told you not to. · When you clean around the catheter, check the surrounding skin for signs of infection. Look for things like pus or irritated, swollen, red, or tender skin around the catheter. Be careful with your drainage bag  · Always keep the drainage bag below the level of your bladder. This will help keep urine from flowing back into your bladder. · Check often to see that urine is flowing through the catheter into the drainage bag. · Empty the drainage bag when it is half full. This will keep it from overflowing or backing up. · When you empty the drainage bag, do not let the tubing or drain spout touch anything. Be careful with your catheter  · Do not unhook the catheter from the drain tube. That could let germs get into the tube. · Make sure that the catheter tubing does not get twisted or kinked. · Do not tug or pull on the catheter. And make sure that the drainage bag does not drag or pull on the catheter. · Do not put powder or lotion on the skin around the catheter. · Talk with your doctor about your options for sexual intercourse while wearing a catheter. How do you empty a urine drainage bag? If your doctor has asked you to keep a record, write down the amount of urine in the bag before you empty it.   Wash your hands before and after you touch the bag. 1. Remove the drain spout from its sleeve at the bottom of the drainage bag.  2. Open the valve on the drain spout. Let the urine flow out into the toilet or a container. Be careful not to let the tubing or drain spout touch anything. 3. After you empty the bag, wipe off any liquid on the end of the drain spout. Close the valve. Then put the drain spout back into its sleeve at the bottom of the collection bag. How do you add a bedside bag to a leg bag? Wash your hands before and after you handle the bags. 1. Empty the leg bag attached to the catheter. 2. Put a clean towel under the leg bag.  3. Use an alcohol wipe to clean the tip of the bedside bag. Then connect the bedside bag to the leg bag. How can you clean a bedside drainage bag? Many people clean their bedside bag in the morning if they switch to a leg bag. To clean a bedside drainage ba. Remove the bedside bag from the leg bag.  2. Fill the bag with 2 parts vinegar and 3 parts water. Let it stand for 20 minutes. 3. Empty the bag, and let it air dry. When should you call for help? Call your doctor now or seek immediate medical care if:  · You have symptoms of a urinary infection. These may include:  ¨ Pain or burning when you urinate. ¨ A frequent need to urinate without being able to pass much urine. ¨ Pain in the flank, which is just below the rib cage and above the waist on either side of the back. ¨ Blood in your urine. ¨ A fever. · Your urine smells bad. · You see large blood clots in your urine. · No urine or very little urine is flowing into the bag for 4 or more hours. Watch closely for changes in your health, and be sure to contact your doctor if:  · The area around the catheter becomes irritated, swollen, red, or tender, or there is pus draining from it. · Urine is leaking from the place where the catheter enters your body. Follow-up care is a key part of your treatment and safety.  Be sure to make and go to all appointments, and call your doctor if you are having problems. It's also a good idea to know your test results and keep a list of the medicines you take. Where can you learn more? Go to http://héctor-parker.info/. Enter C910 in the search box to learn more about \"Learning About Urinary Catheter Care to Prevent Infection. \"  Current as of: April 13, 2017  Content Version: 11.3  © 1657-9922 [employer]. Care instructions adapted under license by [employer] (which disclaims liability or warranty for this information). If you have questions about a medical condition or this instruction, always ask your healthcare professional. [address] any warranty or liability for your use of this information.

## 2017-08-19 NOTE — ED TRIAGE NOTES
Triage: Patient arrives ambulatory from home with c/o hematuria this morning beginning at 0400. Patient had cystoscopy w/ biopsy done on Tuesday by Dr. Geovanny Marquez. No difficulty urinating or dysuria.

## 2018-04-23 ENCOUNTER — OFFICE VISIT (OUTPATIENT)
Dept: CARDIOLOGY CLINIC | Age: 83
End: 2018-04-23

## 2018-04-23 VITALS
DIASTOLIC BLOOD PRESSURE: 70 MMHG | HEIGHT: 68 IN | BODY MASS INDEX: 26.4 KG/M2 | WEIGHT: 174.2 LBS | HEART RATE: 70 BPM | OXYGEN SATURATION: 96 % | RESPIRATION RATE: 16 BRPM | SYSTOLIC BLOOD PRESSURE: 130 MMHG

## 2018-04-23 DIAGNOSIS — R06.02 SHORTNESS OF BREATH: ICD-10-CM

## 2018-04-23 DIAGNOSIS — I71.40 ABDOMINAL AORTIC ANEURYSM (AAA) WITHOUT RUPTURE: Primary | ICD-10-CM

## 2018-04-23 DIAGNOSIS — I48.20 CHRONIC ATRIAL FIBRILLATION (HCC): ICD-10-CM

## 2018-04-23 RX ORDER — ASPIRIN 81 MG/1
TABLET ORAL DAILY
COMMUNITY

## 2018-04-23 RX ORDER — DIGOXIN 250 MCG
TABLET ORAL
Qty: 90 TAB | Refills: 3 | Status: SHIPPED | OUTPATIENT
Start: 2018-04-23 | End: 2019-04-22 | Stop reason: SDUPTHER

## 2018-04-23 NOTE — PATIENT INSTRUCTIONS
Start baby Aspirin 81 mg daily    Echo now (Will call result)    Abdominal Ultra sound in 6 months and see Mathieu Sahu after test

## 2018-04-23 NOTE — MR AVS SNAPSHOT
[address] [address] 
[phone] Patient: Jono Rodrigues 
MRN: [id number] Visit Information Date & Time Provider Department Dept. Phone Encounter #  
 4/23/2018  2:20 PM Brice Valle MD [facility] [place] [phone] [id number] Upcoming Health Maintenance Date Due DTaP/Tdap/Td series (1 - Tdap) 6/5/1953 GLAUCOMA SCREENING Q2Y 6/5/1997 MEDICARE YEARLY EXAM 3/14/2018 Allergies as of 4/23/2018  Review Complete On: 4/23/2018 By: Brice Valle MD  
 No Known Allergies Current Immunizations  Reviewed on 11/16/2016 Name Date Influenza High Dose Vaccine PF 9/21/2017, 11/3/2016 Influenza Vaccine 10/7/2013 Influenza Vaccine Split 10/7/2011 Pneumococcal Conjugate (PCV-13) 11/27/2015 ZZZ-RETIRED (DO NOT USE) Pneumococcal Vaccine (Unspecified Type) 11/13/2003, 4/29/1998 Not reviewed this visit You Were Diagnosed With   
  
 Codes Comments Chronic atrial fibrillation (HCC)    -  Primary ICD-10-CM: X29.0 ICD-9-CM: 427.31 Vitals BP Pulse Resp Height(growth percentile) Weight(growth percentile) SpO2  
 130/70 (BP 1 Location: Left arm, BP Patient Position: Sitting) 70 16 5' 8\" (1.727 m) 174 lb 3.2 oz (79 kg) 96% BMI Smoking Status 26.49 kg/m2 Former Smoker Vitals History BMI and BSA Data Body Mass Index Body Surface Area  
 26.49 kg/m 2 1.95 m 2 Preferred Pharmacy Pharmacy Name Phone [facility] [address], [address] [place] [address] AT [address] [phone] Your Updated Medication List  
  
   
This list is accurate as of 4/23/18  3:40 PM.  Always use your most recent med list.  
  
  
  
  
 aspirin delayed-release 81 mg tablet Take  by mouth daily. digoxin 0.25 mg tablet Commonly known as:  LANOXIN  
 TAKE 1 TABLET BY MOUTH EVERY DAY  
  
 MULTIPLE VITAMINS PO Take  by mouth. tamoxifen 20 mg tablet Commonly known as:  NOLVADEX Take 20 mg by mouth daily. VITAMIN B-12 PO Take  by mouth daily. VITAMIN D3 1,000 unit tablet Generic drug:  cholecalciferol Take 400 Units by mouth daily. We Performed the Following AMB POC EKG ROUTINE W/ 12 LEADS, INTER & REP [84160 CPT(R)] Patient Instructions Start baby Aspirin 81 mg daily Echo now (Will call result) Abdominal Ultra sound in 6 months and see Le Wisdom after test 
 
 
  
Introducing [facility]! Nahum Carrington introduces Datameer patient portal. Now you can access parts of your medical record, email your doctor's office, and request medication refills online. 1. In your internet browser, go to [email or web address] 2. Click on the First Time User? Click Here link in the Sign In box. You will see the New Member Sign Up page. 3. Enter your Datameer Access Code exactly as it appears below. You will not need to use this code after youve completed the sign-up process. If you do not sign up before the expiration date, you must request a new code. · Datameer Access Code: [email or web address] Expires: 7/22/2018  3:03 PM 
 
4. Enter the last four digits of your Social Security Number (xxxx) and Date of Birth (mm/dd/yyyy) as indicated and click Submit. You will be taken to the next sign-up page. 5. Create a Datameer ID. This will be your Datameer login ID and cannot be changed, so think of one that is secure and easy to remember. 6. Create a Datameer password. You can change your password at any time. 7. Enter your Password Reset Question and Answer. This can be used at a later time if you forget your password. 8. Enter your e-mail address. You will receive e-mail notification when new information is available in [address] 9. Click Sign Up.  You can now view and download portions of your medical record. 10. Click the Download Summary menu link to download a portable copy of your medical information. If you have questions, please visit the Frequently Asked Questions section of the [employer] website. Remember, [employer] is NOT to be used for urgent needs. For medical emergencies, dial 911. Now available from your iPhone and Android! Please provide this summary of care documentation to your next provider. Your primary care clinician is listed as Teresa Silva. If you have any questions after today's visit, please call [phone].

## 2018-04-23 NOTE — PROGRESS NOTES
HISTORY OF PRESENT ILLNESS  Ever Ambrose is a 80 y.o. male. with a past medical history remarkable for prostate cancer diagnosed approximately in [address] [place] [address], for which he has undergone prostatectomy, radiation therapy at the level of the pelvis. He also has a history of left breast cancer, for which he has undergone left mastectomy in 2004, chest radiation and chemotherapy. He denies any previous history of hypertension, hyperlipidemia, diabetes or coronary artery disease. He was seen in the hospital at [facility] in May 2008 for dizziness. He was found to have atrial fibrillation. On account of that, he underwent JOHN followed by DC cardioversion. To be noted that the ejection fraction was estimated at 60% at that time. He only had trace to mild mitral regurgitation and tricuspid regurgitation. He converted back to normal sinus rhythm promptly after cardioversion. He has had recurrence of atrial fibrillation and has persistent atrial fibrillation. Echo on 2/98: Systolic function was normal. Ejection fraction was  estimated in the range of 60 % to 65 %. There were no regional wall motion  abnormalities. Left atrium: The atrium was mildly dilated. Mitral valve: There was mild annular calcification. Tricuspid valve: There was mild regurgitation.        Past Medical History:   Diagnosis Date    Atrial fibrillation ([address]      Breast cancer ([address] 7/26/2011    Breast cancer, male ([address] 7/5/2012    Colon polyps 7/26/2011    Glucose intolerance (impaired glucose tolerance) 10/31/2013    Prostate cancer ([address] 7/26/2011    Typhoid fever 7/26/2011            Past Surgical History:   Procedure Laterality Date    HX CATARACT REMOVAL        HX MASTECTOMY        HX POLYPECTOMY        HX PROSTATECTOMY        HX TONSILLECTOMY                  Social History   Substance Use Topics    Smoking status: Former Smoker       Packs/day: 2.00    Smokeless tobacco: Never Used    Alcohol use 3.5 oz/week        7 drink(s) per week          Comment: A drink at night       HPI  Doing ok still with some urological issues closely followed  Some sob reported no cp  Review of Systems   Respiratory: Positive for shortness of breath. Cardiovascular: Negative. Genitourinary: Negative for hematuria. Visit Vitals    /70 (BP 1 Location: Left arm, BP Patient Position: Sitting)    Pulse 70    Resp 16    Ht 5' 8\" (1.727 m)    Wt 79 kg (174 lb 3.2 oz)    SpO2 96%    BMI 26.49 kg/m2       Physical Exam   Neck: No JVD present. Carotid bruit is not present. Cardiovascular: Normal rate. An irregularly irregular rhythm present. Murmur heard. Systolic murmur is present with a grade of 1/6   Pulmonary/Chest: Effort normal and breath sounds normal.   Abdominal: Soft. Musculoskeletal: He exhibits no edema. Psychiatric: He has a normal mood and affect. Current Outpatient Prescriptions on File Prior to Visit   Medication Sig Dispense Refill    tamoxifen (NOLVADEX) 20 mg tablet Take 20 mg by mouth daily.  CYANOCOBALAMIN, VITAMIN B-12, (VITAMIN B-12 PO) Take  by mouth daily.  digoxin (LANOXIN) 0.25 mg tablet TAKE 1 TABLET BY MOUTH EVERY DAY 90 Tab 3    cholecalciferol (VITAMIN D3) 1,000 unit tablet Take 400 Units by mouth daily.  MULTIVITAMIN (MULTIPLE VITAMINS PO) Take  by mouth. No current facility-administered medications on file prior to visit. ASSESSMENT and PLAN  AF: persistent , rate is controlled on digoxin. Normal EF by echo of 9/14. Given his reported sob proceed with echocardiogram again  Off eliquis secondary to hematuria  Discussed at length risk of cva with AF  For now start at least asa 81 mg daily side effects explained he will let me know if further hematuria  HTN: well controlled in this office.    His ecg shows AF and nstt and no changes from previous  HTN: reasonably controlled at this time no further issues with low BP and dizziness, drinking daily gatorades   HLD: closely followed by her PCP  Hematuria: not recurrent but he tells me psa again elevated , closely followed by urology  AAA: 3.2 cm aaa by ct scan will need re checking in 6 months    See him back in 6 months

## 2018-05-01 ENCOUNTER — CLINICAL SUPPORT (OUTPATIENT)
Dept: CARDIOLOGY CLINIC | Age: 83
End: 2018-05-01

## 2018-05-01 DIAGNOSIS — I36.1 TRICUSPID VALVE INCOMPETENCE, NON-RHEUMATIC: ICD-10-CM

## 2018-05-01 DIAGNOSIS — I51.7 ATRIAL ENLARGEMENT, BILATERAL: ICD-10-CM

## 2018-05-01 DIAGNOSIS — I51.7 LVH (LEFT VENTRICULAR HYPERTROPHY): ICD-10-CM

## 2018-05-01 DIAGNOSIS — R06.02 SOB (SHORTNESS OF BREATH): Primary | ICD-10-CM

## 2018-05-01 DIAGNOSIS — I34.81 MITRAL ANNULAR CALCIFICATION: ICD-10-CM

## 2018-08-07 ENCOUNTER — HOSPITAL ENCOUNTER (OUTPATIENT)
Dept: ULTRASOUND IMAGING | Age: 83
Discharge: HOME | End: 2018-08-07
Attending: INTERNAL MEDICINE
Payer: MEDICARE

## 2018-08-07 ENCOUNTER — HOSPITAL ENCOUNTER (OUTPATIENT)
Dept: GENERAL RADIOLOGY | Age: 83
Discharge: HOME | End: 2018-08-07
Attending: RADIOLOGY
Payer: MEDICARE

## 2018-08-07 VITALS
RESPIRATION RATE: 20 BRPM | HEIGHT: 68 IN | SYSTOLIC BLOOD PRESSURE: 170 MMHG | DIASTOLIC BLOOD PRESSURE: 73 MMHG | BODY MASS INDEX: 26.22 KG/M2 | WEIGHT: 173 LBS | TEMPERATURE: 98 F | HEART RATE: 66 BPM | OXYGEN SATURATION: 98 %

## 2018-08-07 DIAGNOSIS — C50.922: ICD-10-CM

## 2018-08-07 DIAGNOSIS — J90 PLEURAL EFFUSION, LEFT: ICD-10-CM

## 2018-08-07 PROCEDURE — 88112 CYTOPATH CELL ENHANCE TECH: CPT | Performed by: INTERNAL MEDICINE

## 2018-08-07 PROCEDURE — 32555 ASPIRATE PLEURA W/ IMAGING: CPT

## 2018-08-07 PROCEDURE — 74011250636 HC RX REV CODE- 250/636: Performed by: RADIOLOGY

## 2018-08-07 PROCEDURE — 74011000250 HC RX REV CODE- 250: Performed by: RADIOLOGY

## 2018-08-07 PROCEDURE — 88305 TISSUE EXAM BY PATHOLOGIST: CPT | Performed by: INTERNAL MEDICINE

## 2018-08-07 PROCEDURE — 71045 X-RAY EXAM CHEST 1 VIEW: CPT

## 2018-08-07 RX ORDER — LIDOCAINE HYDROCHLORIDE 10 MG/ML
5 INJECTION, SOLUTION EPIDURAL; INFILTRATION; INTRACAUDAL; PERINEURAL ONCE
Status: COMPLETED | OUTPATIENT
Start: 2018-08-07 | End: 2018-08-07

## 2018-08-07 RX ADMIN — LIDOCAINE HYDROCHLORIDE 5 ML: 10 INJECTION, SOLUTION EPIDURAL; INFILTRATION; INTRACAUDAL; PERINEURAL at 11:07

## 2018-08-07 RX ADMIN — SODIUM BICARBONATE 1 ML: 0.2 INJECTION, SOLUTION INTRAVENOUS at 11:04

## 2018-08-07 NOTE — DISCHARGE INSTRUCTIONS
[address] THORACENTESIS DISCHARGE INSTRUCTIONS    General Information:  During this procedures, the doctor will insert a needle into the body to drain fluid from the chest. After the procedure, you will be able to take a deep breath much easier. The site of the puncture may ooze the first day. This will decrease and eventually stop. With the Thoracentesis (draining fluid from the chest), there is a risk of air leaking into the chest around the lung, and risk of bleeding into the chest, with the resulting pressure on the lung possibly making it collapse. A chest x-ray is done after the procedure to detect possible complications. Home Care Instructions:  Keep the puncture site clean and dry. No tub baths or swimming until puncture site heals. Showering is acceptable. Resume your normal diet, and resume your normal activity slowly and as you tolerate. If you are short of breath, rest. If shortness of breath does not ease, please call your ordering doctor. Fluid can re-accumulate in the chest and/or in the abdomen. If this should occur, your doctor needs to know as you may need to have the procedure done again. Call If:     You should call your Physician and/or the Radiology Nurse if you notice any signs of infection, like pus draining, or if it is swollen or reddened. Also call if you have a fever, or if you are bleeding from the puncture site more than a small amount on the dressing. Call if the puncture site keeps draining fluid. Some oozing is to be expected, but should slow and then stop. Call if you feel like you have pressure in your abdomen. SEEK IMMEDIATE CARE OR CALL 911 IF YOU SUDDENLY HAVE TROUBLE BREATHING, OR IF YOUR LIPS TURN BLUE, OR IF YOU NOTICE BLOOD IN YOUR SPUTUM. Follow-Up Instructions: Please see your ordering doctor as he/she has requested.       To Reach Us:       Should you experience any of these significant changes, please call [phone] between the hours of 7:30 am and 10 pm or [phone] after hours.  After hours, ask the  to page the [address] Technologist, and describe the problem to the technologist.            Date: 8/7/2018  Discharging Nurse: Azul Mcginnis RN

## 2018-08-07 NOTE — PROCEDURES
PROCEDURE:Left thoracentesis. INDICATION:SOB. ANESTHESIA:local.  COMPLICATION:NONE. SPECIMENS REMOVED:1100cc:samples to lab. BLOOD LOSS:NONE. /ASSISTANT:JACQUIE Barker RECOMMENDATIONS:CXR-ordered. CONSENT OBTAINED:YES.  NOTES:none.

## 2018-08-07 NOTE — ROUTINE PROCESS
Post procedure chest ray done and read by Brie Crocker. Rena Contreras for discharge. Discharged via wheel chair to friend. No complaints voiced.

## 2018-08-24 ENCOUNTER — HOSPITAL ENCOUNTER (OUTPATIENT)
Dept: PET IMAGING | Age: 83
Discharge: HOME | End: 2018-08-24
Attending: INTERNAL MEDICINE
Payer: MEDICARE

## 2018-08-24 VITALS — HEIGHT: 69 IN | BODY MASS INDEX: 26.07 KG/M2 | WEIGHT: 176 LBS

## 2018-08-24 DIAGNOSIS — J91.0 MALIGNANT PLEURAL EFFUSION: ICD-10-CM

## 2018-08-24 DIAGNOSIS — C50.929 MALIGNANT NEOPLASM OF MALE BREAST (HCC): ICD-10-CM

## 2018-08-24 DIAGNOSIS — R91.8 LUNG MASS: ICD-10-CM

## 2018-08-24 PROCEDURE — A9552 F18 FDG: HCPCS

## 2018-08-24 RX ORDER — SODIUM CHLORIDE 0.9 % (FLUSH) 0.9 %
10 SYRINGE (ML) INJECTION
Status: COMPLETED | OUTPATIENT
Start: 2018-08-24 | End: 2018-08-24

## 2018-08-24 RX ADMIN — Medication 10 ML: at 12:50

## 2018-09-20 ENCOUNTER — HOSPITAL ENCOUNTER (EMERGENCY)
Age: 83
Discharge: HOME | End: 2018-09-20
Attending: EMERGENCY MEDICINE
Payer: MEDICARE

## 2018-09-20 VITALS
DIASTOLIC BLOOD PRESSURE: 91 MMHG | HEART RATE: 87 BPM | RESPIRATION RATE: 20 BRPM | SYSTOLIC BLOOD PRESSURE: 167 MMHG | TEMPERATURE: 98.1 F | WEIGHT: 170 LBS | HEIGHT: 68 IN | OXYGEN SATURATION: 98 % | BODY MASS INDEX: 25.76 KG/M2

## 2018-09-20 DIAGNOSIS — N13.9 URINARY OBSTRUCTION: Primary | ICD-10-CM

## 2018-09-20 DIAGNOSIS — R31.9 HEMATURIA, UNSPECIFIED TYPE: ICD-10-CM

## 2018-09-20 LAB
APPEARANCE UR: ABNORMAL
BACTERIA URNS QL MICRO: NEGATIVE /HPF
BILIRUB UR QL: NEGATIVE
COLOR UR: ABNORMAL
EPITH CASTS URNS QL MICRO: ABNORMAL /LPF
GLUCOSE UR STRIP.AUTO-MCNC: NEGATIVE MG/DL
HGB UR QL STRIP: ABNORMAL
KETONES UR QL STRIP.AUTO: ABNORMAL MG/DL
LEUKOCYTE ESTERASE UR QL STRIP.AUTO: ABNORMAL
NITRITE UR QL STRIP.AUTO: NEGATIVE
PH UR STRIP: 7 [PH] (ref 5–8)
PROT UR STRIP-MCNC: 100 MG/DL
RBC #/AREA URNS HPF: >100 /HPF (ref 0–5)
SP GR UR REFRACTOMETRY: 1.02 (ref 1–1.03)
UR CULT HOLD, URHOLD: NORMAL
UROBILINOGEN UR QL STRIP.AUTO: 0.2 EU/DL (ref 0.2–1)
WBC URNS QL MICRO: ABNORMAL /HPF (ref 0–4)

## 2018-09-20 PROCEDURE — 51702 INSERT TEMP BLADDER CATH: CPT

## 2018-09-20 PROCEDURE — 77030034849

## 2018-09-20 PROCEDURE — 81001 URINALYSIS AUTO W/SCOPE: CPT | Performed by: EMERGENCY MEDICINE

## 2018-09-20 PROCEDURE — 74011000250 HC RX REV CODE- 250: Performed by: EMERGENCY MEDICINE

## 2018-09-20 PROCEDURE — 99283 EMERGENCY DEPT VISIT LOW MDM: CPT

## 2018-09-20 RX ORDER — LIDOCAINE HYDROCHLORIDE 20 MG/ML
JELLY TOPICAL ONCE
Status: COMPLETED | OUTPATIENT
Start: 2018-09-20 | End: 2018-09-20

## 2018-09-20 RX ADMIN — LIDOCAINE HYDROCHLORIDE: 20 JELLY TOPICAL at 08:18

## 2018-09-20 NOTE — ED TRIAGE NOTES
Triage Note: Patient is coming in with urinary retention and hematuria that started last night. Patient had procedure at [facility] yesterday. Patient self cath's self and unable to empty.

## 2018-09-20 NOTE — ED PROVIDER NOTES
HPI Comments: 80-year-old white male presents to the emergency department with urinary retention and hematuria. Patient had his spinal urology procedure yesterday for bladder cancer. He says that they inject tuberculosis in his bladder and he rolls around for 2 hours. He says that usually this causes hematuria. After the procedure yesterday he was having hematuria yesterday evening. He reports that overnight he was unable to urinate. He was self catheterizing himself. He would get small amounts of bloody urine but then he would be unable to urinate in between. Pt complains of mild amount of suprapubic discomfort currently and inability to void currently. No vomiting or diarrhea. No fevers. No chest pain. No shortness of breath. Patient is not on blood thinners. Patient denies headaches. No other bleeding episodes. Patient denies tobacco or alcohol use. The history is provided by the patient. Past Medical History:  
Diagnosis Date  Atrial fibrillation ([address]  Bladder cancer [facility]  Breast cancer ([facility] 7/26/2011  Breast cancer, male ([facility] 07/05/2012  Colon polyps 7/26/2011  Glucose intolerance (impaired glucose tolerance) 10/31/2013  Prostate cancer ([facility] 07/26/2011  Typhoid fever 7/26/2011 Past Surgical History:  
Procedure Laterality Date  HX CATARACT REMOVAL    
 HX MASTECTOMY  HX POLYPECTOMY  HX PROSTATECTOMY  HX TONSILLECTOMY History reviewed. No pertinent family history. Social History Social History  Marital status:  Spouse name: N/A  
 Number of children: N/A  
 Years of education: N/A Occupational History  Not on file. Social History Main Topics  Smoking status: Former Smoker Packs/day: 2.00  Smokeless tobacco: Never Used  Alcohol use 3.5 oz/week 7 Standard drinks or equivalent per week Comment: A drink at night  Drug use: No  
 Sexual activity: Not on file Other Topics Concern  Not on file Social History Narrative ALLERGIES: Review of patient's allergies indicates no known allergies. Review of Systems Constitutional: Negative for fever. HENT: Negative for congestion. Eyes: Negative for pain. Respiratory: Negative for cough and shortness of breath. Cardiovascular: Negative for chest pain and leg swelling. Gastrointestinal: Positive for abdominal pain. Endocrine: Negative for polyuria. Genitourinary: Positive for decreased urine volume, difficulty urinating and hematuria. Negative for flank pain. Musculoskeletal: Negative for neck pain. Skin: Negative for color change. Allergic/Immunologic: Negative for immunocompromised state. Neurological: Negative for headaches. Hematological: Does not bruise/bleed easily. Psychiatric/Behavioral: Negative for confusion. All other systems reviewed and are negative. Vitals:  
 09/20/18 7672 BP: (!) 172/110 Pulse: 92 Resp: 20 Temp: 97.4 °F (36.3 °C) SpO2: 96% Weight: 77.1 kg (170 lb) Height: 5' 8\" (1.727 m) Physical Exam  
Constitutional: He is oriented to person, place, and time. He appears well-developed and well-nourished. No distress. HENT:  
Head: Normocephalic and atraumatic. Right Ear: External ear normal.  
Left Ear: External ear normal.  
Nose: Nose normal.  
Mouth/Throat: Oropharynx is clear and moist. No oropharyngeal exudate. Eyes: EOM are normal. Pupils are equal, round, and reactive to light. Neck: Normal range of motion. Neck supple. No JVD present. No tracheal deviation present. Cardiovascular: Normal rate, regular rhythm and normal heart sounds. Exam reveals no gallop and no friction rub. No murmur heard. Pulmonary/Chest: Effort normal and breath sounds normal. No stridor. No respiratory distress. He has no wheezes. He has no rales. Abdominal: Soft. Bowel sounds are normal. He exhibits no distension.  There is tenderness. There is no rebound and no guarding. Mild lower abd tenderness in midline, no CVA tenderness Musculoskeletal: Normal range of motion. He exhibits no edema, tenderness or deformity. Neurological: He is alert and oriented to person, place, and time. He has normal reflexes. No cranial nerve deficit. He exhibits normal muscle tone. Coordination normal.  
Skin: Skin is warm and dry. No rash noted. He is not diaphoretic. No erythema. Psychiatric: He has a normal mood and affect. His behavior is normal. Judgment and thought content normal.  
Nursing note and vitals reviewed. MDM Number of Diagnoses or Management Options Diagnosis management comments: Patient has difficulty with urination and hematuria. We'll place a Smith catheter. Will reassess after placement to see if this relieves his symptoms. No indication for blood work at this time. We'll reassess after catheter placement. Patient agrees. Amount and/or Complexity of Data Reviewed Decide to obtain previous medical records or to obtain history from someone other than the patient: yes Review and summarize past medical records: yes Independent visualization of images, tracings, or specimens: yes ED Course Procedures Smith catheter placed and pt feeling better Will give f/u w/ Dr Armen Holloway pt's urologist 
 
UA shows no UTI Leg bag placed for comfort Good return precautions given to patient. Close follow up with PCP recommended. Patient and/or family voices understanding of this plan. Discharge instructions were explained by me and all concerns were addressed.

## 2018-09-20 NOTE — DISCHARGE INSTRUCTIONS

## 2018-09-21 NOTE — CALL BACK NOTE
[facility] Emergency Department Follow Up Call Record Discharged to : Home/Family Home/Home Health/Skilled Facility/Rehab/Assisted Living/Other__Home_____ 1) Did you receive your discharge instruction  This patient veritied DOiB. Doing better 2) Do you understand them? Yes    Smith now out per Urologist. 3) Are you able to follow them? Yes If NO, what can I clarify for you? 4) Do you understand your diagnosis? Yes        
5) Do you know which symptoms should prompt you to call the doctor? Yes    
6) Were you able to fill and  any medications that were prescribed? Not applicable 7) You were prescribed _none__________for ____________________. Common side effects of this medication are____________________. This is not a complete list so please review the forms given from the pharmacy for a complete list. 8) Are there any questions about your medications? No     
 
 h  
 Have you scheduled any recommended doctors appointments (specialty, PCP) YES. Follow up with Urology today 9/21/18. If NO, what barriers are you encountering (transportation/lost contact info/cost/ 
didnt think necessary/no PCP 
9) If discharged with Home Health, has the agency contacted you to schedule vis it? No 
10) Is there anyone available to help you at home (meals, errands, transportation   
monitoring) (adult children, neighbors, private duty companions) Yes   
11) Are you on a special diet? No        
If YES, do you understand the requirements for this diet? Education provided? 12) If presented with cough, bronchitis, COPD, asthma, is it ok to ask that the 
 respiratory disease management educator call you? Not applicable 13)  A) If presented with fall, were you issued an assistive device in the ED Are you using? Not applicable B) If given RX for device, have you obtained? Not applicable If NO, barriers?  
C) Therapist recommended:No 
 Are you able to implement the suggestions? Not applicable If NO, barriers to implementation? D) Are you having any difficulties with mobility inside your home?   
 (steps, bed, tub) NO If YES, ask if the [facility] PT can contact patient and good time and number? 
14)  At the end of your discharge instructions, there is information about accessing [facility], have you had a chance to review those? No     
 
 Do you have any questions about signing up for this service? NO We encourage our patients to be active participants in their healthcare and this site is one of the ways to do that. It will allow you to access parts of your medical record, email your doctors office, schedule appointments, and request medications refills . 15) Are there any other questions that I can answer for you regarding  
 your Emergency department visit? NO Estimated Call Time:_____5:10 PM 
______________ Date/Time:_______________

## 2018-10-24 ENCOUNTER — CLINICAL SUPPORT (OUTPATIENT)
Dept: CARDIOLOGY CLINIC | Age: 83
End: 2018-10-24

## 2018-10-24 ENCOUNTER — OFFICE VISIT (OUTPATIENT)
Dept: CARDIOLOGY CLINIC | Age: 83
End: 2018-10-24

## 2018-10-24 VITALS
DIASTOLIC BLOOD PRESSURE: 78 MMHG | SYSTOLIC BLOOD PRESSURE: 138 MMHG | HEIGHT: 68 IN | OXYGEN SATURATION: 95 % | BODY MASS INDEX: 25.61 KG/M2 | HEART RATE: 68 BPM | WEIGHT: 169 LBS | RESPIRATION RATE: 20 BRPM

## 2018-10-24 DIAGNOSIS — I71.40 AAA (ABDOMINAL AORTIC ANEURYSM) WITHOUT RUPTURE: Primary | ICD-10-CM

## 2018-10-24 DIAGNOSIS — I48.91 ATRIAL FIBRILLATION, UNSPECIFIED TYPE (HCC): Primary | ICD-10-CM

## 2018-10-24 NOTE — MR AVS SNAPSHOT
[address] [facility] [address] [place] 
[phone] Patient: Stephanie Alves 
MRN: [id number] Visit Information Date & Time Provider Department Dept. Phone Encounter #  
 10/24/2018 11:40 AM Eladio Artis MD [facility] Katrin Mendoza [phone] [id number] Your Appointments 10/24/2018 11:00 AM  
VASCULAR TEST with V[facility]ULAR, [facility] ([facility] SCHEDULING) Appt Note: abdominal u/s 11:00 Dr. Navdeep Mccoy 11:40 [address]  [address] [place]  
[address] [facility]  
  
    
 10/24/2018 11:40 AM  
ESTABLISHED PATIENT with Eladio Artis MD  
[facility] ([facility]) Appt Note: abdominal u/s 11:00 Dr. Navdeep Mccoy 11:40 [address]  [address] [place]  
[address] [facility],[address] [phone] Upcoming Health Maintenance Date Due DTaP/Tdap/Td series (1 - Tdap) 6/5/1953 Shingrix Vaccine Age 50> (1 of 2) 6/5/1982 GLAUCOMA SCREENING Q2Y 6/5/1997 Influenza Age 5 to Adult 8/1/2018 MEDICARE YEARLY EXAM 8/31/2019 Allergies as of 10/24/2018  Review Complete On: 9/20/2018 By: Marcelo Pruitt RN No Known Allergies Current Immunizations  Reviewed on 11/16/2016 Name Date Influenza High Dose Vaccine PF 9/21/2017, 11/3/2016 Influenza Vaccine 10/7/2013 Influenza Vaccine Split 10/7/2011 Pneumococcal Conjugate (PCV-13) 11/27/2015 ZZZ-RETIRED (DO NOT USE) Pneumococcal Vaccine (Unspecified Type) 11/13/2003, 4/29/1998 Not reviewed this visit Vitals Smoking Status Former Smoker Your Updated Medication List  
  
   
This list is accurate as of 10/11/18  8:02 AM.  Always use your most recent med list.  
  
  
  
  
 aspirin delayed-release 81 mg tablet Take  by mouth daily. digoxin 0.25 mg tablet Commonly known as:  LANOXIN  
TAKE 1 TABLET BY MOUTH EVERY DAY  
  
 letrozole 2.5 mg tablet Commonly known as:  [facility] Take 2.5 mg by mouth daily. MULTIPLE VITAMINS PO Take  by mouth. VITAMIN B-12 PO Take  by mouth daily. VITAMIN D3 1,000 unit tablet Generic drug:  cholecalciferol Take 400 Units by mouth daily. Introducing [facility]! [employer] introduces [employer] patient portal. Now you can access parts of your medical record, email your doctor's office, and request medication refills online. 1. In your internet browser, go to [email or web address] 2. Click on the First Time User? Click Here link in the Sign In box. You will see the New Member Sign Up page. 3. Enter your [employer] Access Code exactly as it appears below. You will not need to use this code after youve completed the sign-up process. If you do not sign up before the expiration date, you must request a new code. · [employer] Access Code: [email or web address] Expires: 11/1/2018 10:59 AM 
 
4. Enter the last four digits of your Social Security Number (xxxx) and Date of Birth (mm/dd/yyyy) as indicated and click Submit. You will be taken to the next sign-up page. 5. Create a [employer] ID. This will be your [employer] login ID and cannot be changed, so think of one that is secure and easy to remember. 6. Create a [employer] password. You can change your password at any time. 7. Enter your Password Reset Question and Answer. This can be used at a later time if you forget your password. 8. Enter your e-mail address. You will receive e-mail notification when new information is available in [address] 9. Click Sign Up. You can now view and download portions of your medical record. 10. Click the Download Summary menu link to download a portable copy of your medical information. If you have questions, please visit the Frequently Asked Questions section of the [employer] website. Remember, [employer] is NOT to be used for urgent needs. For medical emergencies, dial 911. Now available from your iPhone and Android! Please provide this summary of care documentation to your next provider. Your primary care clinician is listed as Yessi Downing. If you have any questions after today's visit, please call [phone].

## 2018-10-24 NOTE — PROGRESS NOTES
Visit Vitals  /78 (BP 1 Location: Left arm, BP Patient Position: Sitting)   Pulse 68   Resp 20   Ht 5' 8\" (1.727 m)   Wt 169 lb (76.7 kg)   SpO2 95%   BMI 25.70 kg/m²

## 2018-10-24 NOTE — PROGRESS NOTES
HISTORY OF PRESENT ILLNESS  Everardo Capone III is a 80 y.o. male. with a past medical history remarkable for prostate cancer diagnosed approximately in [address] [place] [address], for which he has undergone prostatectomy, radiation therapy at the level of the pelvis. He also has a history of left breast cancer, for which he has undergone left mastectomy in 2004, chest radiation and chemotherapy. He denies any previous history of hypertension, hyperlipidemia, diabetes or coronary artery disease. He was seen in the hospital at [facility] in May 2008 for dizziness. He was found to have atrial fibrillation. On account of that, he underwent JOHN followed by DC cardioversion. To be noted that the ejection fraction was estimated at 60% at that time. He only had trace to mild mitral regurgitation and tricuspid regurgitation. He converted back to normal sinus rhythm promptly after cardioversion. He has had recurrence of atrial fibrillation and has persistent atrial fibrillation. Echo on 3/63: Systolic function was normal. Ejection fraction was  estimated in the range of 60 % to 65 %. There were no regional wall motion  abnormalities. Left atrium: The atrium was mildly dilated. Mitral valve: There was mild annular calcification. Tricuspid valve: There was mild regurgitation. ECHO on 5/18:Left ventricle: Systolic function was normal. Ejection fraction was  estimated to be 63 % by Huerta's biplane technique. There were no  regional wall motion abnormalities. There was mild concentric hypertrophy. Left atrium: The atrium was moderately dilated. Right atrium: The atrium was moderately dilated. Mitral valve: There was mild annular calcification. Tricuspid valve: There was mild to nearing moderate regurgitation. Pulmonary artery systolic pressure: 35 mmHg. Pulmonic valve: There was mild regurgitation.   Past Medical History:   Diagnosis Date    Atrial fibrillation ([facility]     Bladder cancer ([facility]     Breast cancer ([facility] 7/26/2011    Breast cancer, male ([facility] 07/05/2012    Colon polyps 7/26/2011    Glucose intolerance (impaired glucose tolerance) 10/31/2013    Prostate cancer ([facility] 07/26/2011    Typhoid fever 7/26/2011     Past Surgical History:   Procedure Laterality Date    HX CATARACT REMOVAL      HX MASTECTOMY      HX POLYPECTOMY      HX PROSTATECTOMY      HX TONSILLECTOMY                          Social History   Substance Use Topics    Smoking status: Former Smoker       Packs/day: 2.00    Smokeless tobacco: Never Used    Alcohol use 3.5 oz/week         7 drink(s) per week           Comment: A drink at night        HPI  No cp or sob reported cardiac wise doing well  Review of Systems   Respiratory: Negative. Cardiovascular: Negative. Visit Vitals  /78 (BP 1 Location: Left arm, BP Patient Position: Sitting)   Pulse 68   Resp 20   Ht 5' 8\" (1.727 m)   Wt 76.7 kg (169 lb)   SpO2 95%   BMI 25.70 kg/m²       Physical Exam   Neck: No JVD present. Carotid bruit is not present. Cardiovascular: Normal rate. An irregularly irregular rhythm present. Pulmonary/Chest: Effort normal. He has decreased breath sounds in the right lower field and the left lower field. Abdominal: Soft. Musculoskeletal: He exhibits no edema. Psychiatric: He has a normal mood and affect. Current Outpatient Medications on File Prior to Visit   Medication Sig Dispense Refill    letrozole (FEMARA) 2.5 mg tablet Take 2.5 mg by mouth daily.  aspirin delayed-release 81 mg tablet Take  by mouth daily.  digoxin (LANOXIN) 0.25 mg tablet TAKE 1 TABLET BY MOUTH EVERY DAY 90 Tab 3    CYANOCOBALAMIN, VITAMIN B-12, (VITAMIN B-12 PO) Take  by mouth daily.  cholecalciferol (VITAMIN D3) 1,000 unit tablet Take 400 Units by mouth daily.  MULTIVITAMIN (MULTIPLE VITAMINS PO) Take  by mouth. No current facility-administered medications on file prior to visit.           ASSESSMENT and PLAN  AF: persistent , rate is controlled on digoxin. Normal EF by echo of 5/18. Off eliquis secondary to hematuria  Discussed at length risk of cva with AF  Continue asa  ecg unchanged with AF rate controlled, nstt and lowv    HTN: well controlled in this office. HTN: reasonably controlled at this time no further issues with low BP and dizziness, drinking daily gatorades     HLD: closely followed by her PCP    Hematuria: not recurrent but he tells me psa again elevated , closely followed by urology    AAA: 3.3 cm aaa by us on 10/18.  Normal EVELIN on 10/18     See him back in 6 months

## 2019-04-22 RX ORDER — DIGOXIN 250 MCG
TABLET ORAL
Qty: 90 TAB | Refills: 2 | Status: SHIPPED | OUTPATIENT
Start: 2019-04-22 | End: 2019-10-21 | Stop reason: SDUPTHER

## 2019-04-26 ENCOUNTER — OFFICE VISIT (OUTPATIENT)
Dept: CARDIOLOGY CLINIC | Age: 84
End: 2019-04-26

## 2019-04-26 VITALS
WEIGHT: 169 LBS | OXYGEN SATURATION: 98 % | SYSTOLIC BLOOD PRESSURE: 100 MMHG | BODY MASS INDEX: 25.61 KG/M2 | HEART RATE: 66 BPM | RESPIRATION RATE: 16 BRPM | DIASTOLIC BLOOD PRESSURE: 60 MMHG | HEIGHT: 68 IN

## 2019-04-26 DIAGNOSIS — R60.9 EDEMA, UNSPECIFIED TYPE: Primary | ICD-10-CM

## 2019-04-26 DIAGNOSIS — I48.91 ATRIAL FIBRILLATION, UNSPECIFIED TYPE (HCC): ICD-10-CM

## 2019-04-26 RX ORDER — NIFEDIPINE 30 MG/1
TABLET, FILM COATED, EXTENDED RELEASE ORAL
Refills: 0 | COMMUNITY
Start: 2019-03-30 | End: 2019-04-26 | Stop reason: ALTCHOICE

## 2019-04-26 NOTE — PROGRESS NOTES
Visit Vitals  /60 (BP 1 Location: Left arm, BP Patient Position: Sitting)   Pulse 66   Resp 16   Ht 5' 8\" (1.727 m)   Wt 169 lb (76.7 kg)   SpO2 98%   BMI 25.70 kg/m²

## 2019-04-26 NOTE — PROGRESS NOTES
HISTORY OF PRESENT ILLNESS  Augie Saldaña III is a 80 y.o. male. .with a past medical history remarkable for prostate cancer diagnosed approximately in [address] [place] [address], for which he has undergone prostatectomy, radiation therapy at the level of the pelvis. He also has a history of left breast cancer, for which he has undergone left mastectomy in 2004, chest radiation and chemotherapy. He denies any previous history of hypertension, hyperlipidemia, diabetes or coronary artery disease. He was seen in the hospital at [facility] in May 2008 for dizziness. He was found to have atrial fibrillation. On account of that, he underwent JOHN followed by DC cardioversion. To be noted that the ejection fraction was estimated at 60% at that time. He only had trace to mild mitral regurgitation and tricuspid regurgitation. He converted back to normal sinus rhythm promptly after cardioversion. He has had recurrence of atrial fibrillation and has persistent atrial fibrillation. Echo on 4/85: Systolic function was normal. Ejection fraction was  estimated in the range of 60 % to 65 %. There were no regional wall motion  abnormalities. Left atrium: The atrium was mildly dilated. Mitral valve: There was mild annular calcification. Tricuspid valve: There was mild regurgitation. ECHO on 5/18:Left ventricle: Systolic function was normal. Ejection fraction was  estimated to be 63 % by Huerta's biplane technique. There were no  regional wall motion abnormalities. There was mild concentric hypertrophy. Left atrium: The atrium was moderately dilated. Right atrium: The atrium was moderately dilated. Mitral valve: There was mild annular calcification. Tricuspid valve: There was mild to nearing moderate regurgitation. Pulmonary artery systolic pressure: 35 mmHg. Pulmonic valve: There was mild regurgitation.        Past Medical History:   Diagnosis Date    Atrial fibrillation [facility])      Bladder cancer ([address]      Breast cancer ([facility] 7/26/2011    Breast cancer, male ([facility] 07/05/2012    Colon polyps 7/26/2011    Glucose intolerance (impaired glucose tolerance) 10/31/2013    Prostate cancer ([facility] 07/26/2011    Typhoid fever 7/26/2011            Past Surgical History:   Procedure Laterality Date    HX CATARACT REMOVAL        HX MASTECTOMY        HX POLYPECTOMY        HX PROSTATECTOMY        HX TONSILLECTOMY                              Social History   Substance Use Topics    Smoking status: Former Smoker       Packs/day: 2.00    Smokeless tobacco: Never Used    Alcohol use 3.5 oz/week         7 drink(s) per week           Comment: A drink at night        HPI  C/o le edema    fatigue  Diagnosed with metastatic breast ca on hormone replacement  Review of Systems   Constitutional: Positive for malaise/fatigue. Respiratory: Positive for shortness of breath. Cardiovascular: Positive for leg swelling. Visit Vitals  /60 (BP 1 Location: Left arm, BP Patient Position: Sitting)   Pulse 66   Resp 16   Ht 5' 8\" (1.727 m)   Wt 169 lb (76.7 kg)   SpO2 98%   BMI 25.70 kg/m²       Physical Exam   Neck: No JVD present. Carotid bruit is not present. Cardiovascular: Normal rate. An irregularly irregular rhythm present. Murmur heard. Systolic murmur is present with a grade of 1/6. Pulmonary/Chest: Effort normal and breath sounds normal.   Abdominal: Soft. Musculoskeletal: He exhibits edema. 1-2+   Psychiatric: He has a normal mood and affect. Current Outpatient Medications on File Prior to Visit   Medication Sig Dispense Refill    NIFEdipine ER (ADALAT CC) 30 mg ER tablet TAKE ONE T D  0    digoxin (DIGOX) 0.25 mg tablet TAKE 1 TABLET BY MOUTH EVERY DAY 90 Tab 2    letrozole (FEMARA) 2.5 mg tablet Take 2.5 mg by mouth daily.  aspirin delayed-release 81 mg tablet Take  by mouth daily.  CYANOCOBALAMIN, VITAMIN B-12, (VITAMIN B-12 PO) Take  by mouth daily.       cholecalciferol (VITAMIN D3) 1,000 unit tablet Take 400 Units by mouth daily.  MULTIVITAMIN (MULTIPLE VITAMINS PO) Take  by mouth. No current facility-administered medications on file prior to visit. ASSESSMENT and PLAN  AF: persistent , rate is controlled on digoxin. Normal EF by echo of 5/18. Off eliquis secondary to hematuria  Discussed at length risk of cva with AF  Continue asa  ecg unchanged with AF rate controlled, nstt and lowv     HTN:low today , started on adalat in [place] in 11/18 . BP too low in my opinion this could contribute to his fatigue and le edema also may be related to adalat    this will be stopped   echo will be pbtained   no clear dizziness     HLD: closely followed by her PCP     Hematuria: not recurrent but he tells me psa again elevated , closely followed by urology     AAA: 3.3 cm aaa by us on 10/18.  Normal EVELIN on 10/18     See him back in 4 weeks

## 2019-04-29 ENCOUNTER — TELEPHONE (OUTPATIENT)
Dept: CARDIOLOGY CLINIC | Age: 84
End: 2019-04-29

## 2019-04-29 RX ORDER — NIFEDIPINE 30 MG/1
30 TABLET, EXTENDED RELEASE ORAL DAILY
COMMUNITY
End: 2019-04-29 | Stop reason: CLARIF

## 2019-04-29 NOTE — TELEPHONE ENCOUNTER
Patient in clinic for echo. Brought bag of prescription bottles. Wanted to update medication profile. Patient states not taking oxybutynin (bladder) and nifedipine.

## 2019-05-28 ENCOUNTER — OFFICE VISIT (OUTPATIENT)
Dept: CARDIOLOGY CLINIC | Age: 84
End: 2019-05-28

## 2019-05-28 VITALS
RESPIRATION RATE: 18 BRPM | HEART RATE: 68 BPM | HEIGHT: 68 IN | DIASTOLIC BLOOD PRESSURE: 60 MMHG | SYSTOLIC BLOOD PRESSURE: 120 MMHG | WEIGHT: 168 LBS | OXYGEN SATURATION: 98 % | BODY MASS INDEX: 25.46 KG/M2

## 2019-05-28 DIAGNOSIS — I48.20 CHRONIC ATRIAL FIBRILLATION (HCC): ICD-10-CM

## 2019-05-28 DIAGNOSIS — I71.40 AAA (ABDOMINAL AORTIC ANEURYSM) WITHOUT RUPTURE: ICD-10-CM

## 2019-05-28 DIAGNOSIS — R60.9 EDEMA, UNSPECIFIED TYPE: Primary | ICD-10-CM

## 2019-05-28 DIAGNOSIS — I48.91 ATRIAL FIBRILLATION, UNSPECIFIED TYPE (HCC): ICD-10-CM

## 2019-05-28 NOTE — PROGRESS NOTES
Visit Vitals  /60 (BP 1 Location: Left arm, BP Patient Position: Sitting)   Pulse 68   Resp 18   Ht 5' 8\" (1.727 m)   Wt 168 lb (76.2 kg)   SpO2 98%   BMI 25.54 kg/m²

## 2019-05-28 NOTE — PROGRESS NOTES
HISTORY OF PRESENT ILLNESS  Augie Saldaña III is a 80 y.o. male. with a past medical history remarkable for prostate cancer diagnosed approximately in [address] [place] [address], for which he has undergone prostatectomy, radiation therapy at the level of the pelvis. He also has a history of left breast cancer, for which he has undergone left mastectomy in 2004, chest radiation and chemotherapy. He denies any previous history of hypertension, hyperlipidemia, diabetes or coronary artery disease. He was seen in the hospital at [facility] in May 2008 for dizziness. He was found to have atrial fibrillation. On account of that, he underwent JOHN followed by DC cardioversion. To be noted that the ejection fraction was estimated at 60% at that time. He only had trace to mild mitral regurgitation and tricuspid regurgitation. He converted back to normal sinus rhythm promptly after cardioversion. He has had recurrence of atrial fibrillation and has persistent atrial fibrillation. Echo on 6/23: Systolic function was normal. Ejection fraction was  estimated in the range of 60 % to 65 %. There were no regional wall motion  abnormalities. Left atrium: The atrium was mildly dilated. Mitral valve: There was mild annular calcification. Tricuspid valve: There was mild regurgitation. ECHO on 5/18:Left ventricle: Systolic function was normal. Ejection fraction was  estimated to be 63 % by Huerta's biplane technique. There were no  regional wall motion abnormalities. There was mild concentric hypertrophy. Left atrium: The atrium was moderately dilated. Right atrium: The atrium was moderately dilated. Mitral valve: There was mild annular calcification. Tricuspid valve: There was mild to nearing moderate regurgitation. Pulmonary artery systolic pressure: 35 mmHg. Pulmonic valve: There was mild regurgitation. 04/29/19   ECHO ADULT COMPLETE 04/30/2019 4/30/2019    Narrative · Calculated left ventricular ejection fraction is 61%. Biplane method   used to measure ejection fraction. No regional wall motion abnormality   noted. · There is a moderate left pleural effusion. · Left atrial cavity size is mildly dilated. · Moderate aortic valve sclerosis with no significant stenosis. · Mild mitral annular calcification. Mild mitral valve regurgitation. · Tricuspid Valve: Mild tricuspid valve regurgitation is present. · Pulmonary Artery: There is no evidence of pulmonary hypertension. Signed by: Alon Mcbride MD                 Past Medical History:   Diagnosis Date    Atrial fibrillation [facility])      Bladder cancer ([facility]      Breast cancer ([facility] 7/26/2011    Breast cancer, male ([facility] 07/05/2012    Colon polyps 7/26/2011    Glucose intolerance (impaired glucose tolerance) 10/31/2013    Prostate cancer ([facility] 07/26/2011    Typhoid fever 7/26/2011                Past Surgical History:   Procedure Laterality Date    HX CATARACT REMOVAL        HX MASTECTOMY        HX POLYPECTOMY        HX PROSTATECTOMY        HX TONSILLECTOMY                              Social History   Substance Use Topics    Smoking status: Former Smoker       Packs/day: 2.00    Smokeless tobacco: Never Used    Alcohol use 3.5 oz/week         7 drink(s) per week           Comment: A drink at night       HPI  Doing ok no cp or sob   le edema much better off adalat  Review of Systems   Respiratory: Negative. Cardiovascular: Negative. Visit Vitals  /60 (BP 1 Location: Left arm, BP Patient Position: Sitting)   Pulse 68   Resp 18   Ht 5' 8\" (1.727 m)   Wt 168 lb (76.2 kg)   SpO2 98%   BMI 25.54 kg/m²         Physical Exam   Neck: No JVD present. Carotid bruit is not present. Cardiovascular: Normal rate and regular rhythm. Pulmonary/Chest: Effort normal and breath sounds normal.   Abdominal: Soft. Musculoskeletal: He exhibits edema. 1+   Psychiatric: He has a normal mood and affect.      Current Outpatient Medications on File Prior to Visit Medication Sig Dispense Refill    digoxin (DIGOX) 0.25 mg tablet TAKE 1 TABLET BY MOUTH EVERY DAY 90 Tab 2    letrozole (FEMARA) 2.5 mg tablet Take 2.5 mg by mouth daily.  aspirin delayed-release 81 mg tablet Take  by mouth daily.  CYANOCOBALAMIN, VITAMIN B-12, (VITAMIN B-12 PO) Take 3,000 mcg by mouth daily.  cholecalciferol (VITAMIN D3) 1,000 unit tablet Take 1,000 Units by mouth daily.  MULTIVITAMIN (MULTIPLE VITAMINS PO) Take  by mouth. No current facility-administered medications on file prior to visit. ASSESSMENT and PLAN  AF: persistent , rate is controlled on digoxin. Normal EF by echo of 5/19. Off eliquis secondary to hematuria  Discussed at length risk of cva with AF  Continue asa  ecg unchanged with AF rate controlled, nstt and lowv     HTN:normal off adalat and much better le edema  started on adalat in [place] in 11/18 for significant htn  Discussed results of echo    HLD: closely followed by her PCP     Hematuria: not recurrent but he tells me psa again elevated , closely followed by urology     AAA: 3.3 cm aaa by us on 10/18.  Normal EVELIN on 10/18     See him back in 4 months

## 2019-07-01 ENCOUNTER — HOSPITAL ENCOUNTER (OUTPATIENT)
Dept: PET IMAGING | Age: 84
Discharge: HOME | End: 2019-07-01
Attending: INTERNAL MEDICINE
Payer: MEDICARE

## 2019-07-01 VITALS — BODY MASS INDEX: 25.18 KG/M2 | HEIGHT: 69 IN | WEIGHT: 170 LBS

## 2019-07-01 DIAGNOSIS — J91.0 MALIGNANT PLEURAL EFFUSION: ICD-10-CM

## 2019-07-01 DIAGNOSIS — R91.8 OTHER NONSPECIFIC ABNORMAL FINDING OF LUNG FIELD: ICD-10-CM

## 2019-07-01 PROCEDURE — A9552 F18 FDG: HCPCS

## 2019-07-01 RX ORDER — SODIUM CHLORIDE 0.9 % (FLUSH) 0.9 %
10 SYRINGE (ML) INJECTION
Status: COMPLETED | OUTPATIENT
Start: 2019-07-01 | End: 2019-07-01

## 2019-07-01 RX ADMIN — Medication 10 ML: at 08:43

## 2019-07-31 PROBLEM — I70.0 ATHEROSCLEROSIS OF AORTA (HCC): Status: ACTIVE | Noted: 2019-07-31

## 2019-10-11 ENCOUNTER — HOSPITAL ENCOUNTER (OUTPATIENT)
Dept: GENERAL RADIOLOGY | Age: 84
Discharge: HOME | End: 2019-10-11
Attending: INTERNAL MEDICINE
Payer: MEDICARE

## 2019-10-11 DIAGNOSIS — R06.02 SOB (SHORTNESS OF BREATH): ICD-10-CM

## 2019-10-11 PROCEDURE — 71046 X-RAY EXAM CHEST 2 VIEWS: CPT

## 2019-10-21 ENCOUNTER — OFFICE VISIT (OUTPATIENT)
Dept: CARDIOLOGY CLINIC | Age: 84
End: 2019-10-21

## 2019-10-21 VITALS
OXYGEN SATURATION: 96 % | HEART RATE: 62 BPM | HEIGHT: 68 IN | RESPIRATION RATE: 16 BRPM | SYSTOLIC BLOOD PRESSURE: 160 MMHG | DIASTOLIC BLOOD PRESSURE: 90 MMHG | WEIGHT: 170.7 LBS | BODY MASS INDEX: 25.87 KG/M2

## 2019-10-21 DIAGNOSIS — R06.02 SOB (SHORTNESS OF BREATH): ICD-10-CM

## 2019-10-21 DIAGNOSIS — I48.91 ATRIAL FIBRILLATION, UNSPECIFIED TYPE (HCC): Primary | ICD-10-CM

## 2019-10-21 RX ORDER — DIGOXIN 250 MCG
TABLET ORAL
Qty: 90 TAB | Refills: 1 | Status: SHIPPED | OUTPATIENT
Start: 2019-10-21 | End: 2020-04-16

## 2019-10-21 NOTE — PROGRESS NOTES
HISTORY OF PRESENT ILLNESS  Hallie Bob III is a 80 y.o. male. with a past medical history remarkable for prostate cancer diagnosed approximately in [address] [place] [address], for which he has undergone prostatectomy, radiation therapy at the level of the pelvis. He also has a history of left breast cancer, for which he has undergone left mastectomy in 2004, chest radiation and chemotherapy. He denies any previous history of hypertension, hyperlipidemia, diabetes or coronary artery disease. He was seen in the hospital at [facility] in May 2008 for dizziness. He was found to have atrial fibrillation. On account of that, he underwent JOHN followed by DC cardioversion. To be noted that the ejection fraction was estimated at 60% at that time. He only had trace to mild mitral regurgitation and tricuspid regurgitation. He converted back to normal sinus rhythm promptly after cardioversion. He has had recurrence of atrial fibrillation and has persistent atrial fibrillation. Echo on 0/35: Systolic function was normal. Ejection fraction was  estimated in the range of 60 % to 65 %. There were no regional wall motion  abnormalities. Left atrium: The atrium was mildly dilated. Mitral valve: There was mild annular calcification. Tricuspid valve: There was mild regurgitation. ECHO on 5/18:Left ventricle: Systolic function was normal. Ejection fraction was  estimated to be 63 % by Huerta's biplane technique. There were no  regional wall motion abnormalities. There was mild concentric hypertrophy. Left atrium: The atrium was moderately dilated. Right atrium: The atrium was moderately dilated. Mitral valve: There was mild annular calcification. Tricuspid valve: There was mild to nearing moderate regurgitation. Pulmonary artery systolic pressure: 35 mmHg. Pulmonic valve: There was mild regurgitation.        04/29/19   ECHO ADULT COMPLETE 04/30/2019 4/30/2019     Narrative · Calculated left ventricular ejection fraction is 61%. Biplane method   used to measure ejection fraction. No regional wall motion abnormality   noted. · There is a moderate left pleural effusion. · Left atrial cavity size is mildly dilated. · Moderate aortic valve sclerosis with no significant stenosis. · Mild mitral annular calcification. Mild mitral valve regurgitation. · Tricuspid Valve: Mild tricuspid valve regurgitation is present. · Pulmonary Artery: There is no evidence of pulmonary hypertension.          Signed by: Maggy Cespedes MD                    Past Medical History:   Diagnosis Date    Atrial fibrillation [facility])      Bladder cancer ([facility]      Breast cancer ([facility] 7/26/2011    Breast cancer, male ([facility] 07/05/2012    Colon polyps 7/26/2011    Glucose intolerance (impaired glucose tolerance) 10/31/2013    Prostate cancer ([facility] 07/26/2011    Typhoid fever 7/26/2011                Past Surgical History:   Procedure Laterality Date    HX CATARACT REMOVAL        HX MASTECTOMY        HX POLYPECTOMY        HX PROSTATECTOMY        HX TONSILLECTOMY                              Social History   Substance Use Topics    Smoking status: Former Smoker       Packs/day: 2.00    Smokeless tobacco: Never Used    Alcohol use 3.5 oz/week         7 drink(s) per week           Comment: A drink at night       HPI  He is doing fairly well although he tells me he has been having some shortness of breath. A chest x-ray done by his primary care physician revealed the presence of pleural effusion. Now is planning to undergo thoracentesis. No chest pain or palpitations reported. Review of Systems   Respiratory: Positive for shortness of breath. Cardiovascular: Negative. Visit Vitals  /90 (BP 1 Location: Right arm, BP Patient Position: Sitting)   Pulse 62   Resp 16   Ht 5' 8\" (1.727 m)   Wt 170 lb 11.2 oz (77.4 kg)   SpO2 96%   BMI 25.95 kg/m²       Physical Exam   Neck: No JVD present. Carotid bruit is not present.    Cardiovascular: Normal rate. A regularly irregular rhythm present. Murmur heard. Systolic murmur is present with a grade of 2/6. Pulmonary/Chest: Effort normal. He has decreased breath sounds in the right lower field and the left lower field. Abdominal: Soft. Musculoskeletal: He exhibits edema. Psychiatric: He has a normal mood and affect. Current Outpatient Medications on File Prior to Visit   Medication Sig Dispense Refill    OTHER Monthly hormone injection.  NIFEdipine ER (ADALAT CC) 30 mg ER tablet Take 1 Tab by mouth daily. 90 Tab 1    letrozole (FEMARA) 2.5 mg tablet Take 2.5 mg by mouth daily.  aspirin delayed-release 81 mg tablet Take  by mouth daily.  CYANOCOBALAMIN, VITAMIN B-12, (VITAMIN B-12 PO) Take 3,000 mcg by mouth daily.  cholecalciferol (VITAMIN D3) 1,000 unit tablet Take 1,000 Units by mouth daily.  MULTIVITAMIN (MULTIPLE VITAMINS PO) Take  by mouth daily. No current facility-administered medications on file prior to visit. Lab Results   Component Value Date/Time    Cholesterol (POC) 148 10/11/2019 02:03 PM    HDL Cholesterol (POC) 79 10/11/2019 02:03 PM    LDL Cholesterol (POC) 52 10/11/2019 02:03 PM    Triglycerides (POC) 90 10/11/2019 02:03 PM     Lab Results   Component Value Date/Time    ALT (SGPT) 11 10/11/2019 01:38 PM    AST (SGOT) 20 10/11/2019 01:38 PM    Alk. phosphatase 71 10/11/2019 01:38 PM    Bilirubin, total 0.3 10/11/2019 01:38 PM         ASSESSMENT and PLAN  1. Atrial fibrillation: Chronic. Rate is controlled on digoxin. He has had a normal ejection fraction by echocardiogram of May 2019. He is now off Eliquis on account of recurrent hematuria. He will continue with aspirin alone for now. Discussed at length with him in the past risk of CVA with atrial fibrillation and off oral anticoagulation. His electrocardiogram today is unchanged with atrial fibrillation rate control, nonspecific ST-T wave abnormalities. ,    2.  Hypertension: Slightly increased today. He is now back on Adalat. He understands side effects of Adalat including lower extremity edema. This is not significant enough to change the outlet at this time. 3.  Hyperlipidemia: Very well controlled and closely followed by his primary care physician. 4.  Hematuria: Not recurrent. He does have an elevated PSA and is closely followed by urology. 5.  Breast CA in male: Status post recurrent left pleural effusion for thoracentesis. I have asked him to let me know if shortness of breath does not improve in which case we may need to consider repeating echocardiogram.    6.  Abdominal aortic aneurysm: Closely followed by vascular surgeon.     No additional interventions for now he will let me know if any issues arise otherwise I will see him back in 6 months

## 2019-10-21 NOTE — PROGRESS NOTES
Chief Complaint   Patient presents with    Follow-up     5 month. Denies chest pain/dizziness. Continued shortness of breath. Occassional swelling. Visit Vitals  /90 (BP 1 Location: Right arm, BP Patient Position: Sitting)   Pulse 62   Resp 16   Ht 5' 8\" (1.727 m)   Wt 170 lb 11.2 oz (77.4 kg)   SpO2 96%   BMI 25.95 kg/m²     Resides part time in [place].      Being treated for different types of cancer,    Requested Prescriptions     Signed Prescriptions Disp Refills    digoxin (DIGOX) 0.25 mg tablet 90 Tab 1     Sig: TAKE 1 TABLET BY MOUTH EVERY DAY

## 2019-11-08 ENCOUNTER — HOSPITAL ENCOUNTER (OUTPATIENT)
Dept: GENERAL RADIOLOGY | Age: 84
Discharge: HOME | End: 2019-11-08
Attending: RADIOLOGY
Payer: MEDICARE

## 2019-11-08 ENCOUNTER — HOSPITAL ENCOUNTER (OUTPATIENT)
Dept: ULTRASOUND IMAGING | Age: 84
End: 2019-11-08
Attending: INTERNAL MEDICINE
Payer: MEDICARE

## 2019-11-08 ENCOUNTER — HOSPITAL ENCOUNTER (OUTPATIENT)
Dept: INTERVENTIONAL RADIOLOGY/VASCULAR | Age: 84
Discharge: HOME | End: 2019-11-08
Attending: INTERNAL MEDICINE
Payer: MEDICARE

## 2019-11-08 VITALS
RESPIRATION RATE: 20 BRPM | TEMPERATURE: 97.5 F | WEIGHT: 168 LBS | OXYGEN SATURATION: 98 % | BODY MASS INDEX: 25.46 KG/M2 | DIASTOLIC BLOOD PRESSURE: 83 MMHG | HEART RATE: 70 BPM | SYSTOLIC BLOOD PRESSURE: 134 MMHG | HEIGHT: 68 IN

## 2019-11-08 DIAGNOSIS — C50.929 MALIGNANT NEOPLASM OF MALE BREAST (HCC): ICD-10-CM

## 2019-11-08 DIAGNOSIS — R91.8 OTHER NONSPECIFIC ABNORMAL FINDING OF LUNG FIELD: ICD-10-CM

## 2019-11-08 DIAGNOSIS — J91.0 MALIGNANT PLEURAL EFFUSION: ICD-10-CM

## 2019-11-08 PROCEDURE — 71045 X-RAY EXAM CHEST 1 VIEW: CPT

## 2019-11-08 PROCEDURE — 32555 ASPIRATE PLEURA W/ IMAGING: CPT

## 2019-11-08 NOTE — PROGRESS NOTES
Pt discharged to home with belongings and instructions via wheelchair. Pt verbalized understanding of instructions. Dr. Sprague Sample drained off 1700cc of clear and then slightly serosanguineous pleural fluid from left lung.   Pt at discharge has level of pain at 1 now Walking without SOB

## 2019-11-08 NOTE — DISCHARGE INSTRUCTIONS
[address] THORACENTESIS DISCHARGE INSTRUCTIONS    General Information:  During this procedures, the doctor will insert a needle into the body to drain fluid from the chest. After the procedure, you will be able to take a deep breath much easier. The site of the puncture may ooze the first day. This will decrease and eventually stop. With the Thoracentesis (draining fluid from the chest), there is a risk of air leaking into the chest around the lung, and risk of bleeding into the chest, with the resulting pressure on the lung possibly making it collapse. A chest x-ray is done after the procedure to detect possible complications. Home Care Instructions:  Keep the puncture site clean and dry. No tub baths or swimming until puncture site heals. Showering is acceptable. Resume your normal diet, and resume your normal activity slowly and as you tolerate. If you are short of breath, rest. If shortness of breath does not ease, please call your ordering doctor. Fluid can re-accumulate in the chest and/or in the abdomen. If this should occur, your doctor needs to know as you may need to have the procedure done again. Call If:     You should call your Physician and/or the Radiology Nurse if you notice any signs of infection, like pus draining, or if it is swollen or reddened. Also call if you have a fever, or if you are bleeding from the puncture site more than a small amount on the dressing. Call if the puncture site keeps draining fluid. Some oozing is to be expected, but should slow and then stop. Call if you feel like you have pressure in your abdomen. SEEK IMMEDIATE CARE OR CALL 911 IF YOU SUDDENLY HAVE TROUBLE BREATHING, OR IF YOUR LIPS TURN BLUE, OR IF YOU NOTICE BLOOD IN YOUR SPUTUM. Follow-Up Instructions: Please see your ordering doctor as he/she has requested. To Reach Us:  Side effects of sedation medications and other medications used today have been reviewed.   Notify us of nausea, itching, hives, dizziness, or anything else out of the ordinary. Should you experience any of these significant changes, please call [phone] between the hours of 7:30 am and 10 pm or [phone] after hours.  After hours, ask the  to page the [address] Technologist, and describe the problem to the technologist.           Date: 11/8/2019  Discharging Nurse: Nirmala Raymond RN

## 2019-11-11 ENCOUNTER — HOSPITAL ENCOUNTER (OUTPATIENT)
Dept: PET IMAGING | Age: 84
Discharge: HOME | End: 2019-11-11
Attending: INTERNAL MEDICINE
Payer: MEDICARE

## 2019-11-11 VITALS — WEIGHT: 168 LBS | HEIGHT: 68 IN | BODY MASS INDEX: 25.46 KG/M2

## 2019-11-11 DIAGNOSIS — C50.929 MALIGNANT NEOPLASM OF MALE BREAST (HCC): ICD-10-CM

## 2019-11-11 DIAGNOSIS — R91.8 OTHER NONSPECIFIC ABNORMAL FINDING OF LUNG FIELD: ICD-10-CM

## 2019-11-11 DIAGNOSIS — J91.0 MALIGNANT PLEURAL EFFUSION: ICD-10-CM

## 2019-11-11 PROCEDURE — A9552 F18 FDG: HCPCS

## 2019-11-11 RX ORDER — SODIUM CHLORIDE 0.9 % (FLUSH) 0.9 %
10 SYRINGE (ML) INJECTION
Status: COMPLETED | OUTPATIENT
Start: 2019-11-11 | End: 2019-11-11

## 2019-11-11 RX ADMIN — Medication 10 ML: at 13:02

## 2020-01-01 ENCOUNTER — TELEPHONE (OUTPATIENT)
Dept: CARDIOLOGY CLINIC | Age: 85
End: 2020-01-01

## 2020-01-01 ENCOUNTER — HOSPITAL ENCOUNTER (OUTPATIENT)
Dept: PET IMAGING | Age: 85
Discharge: HOME | End: 2020-11-13
Attending: INTERNAL MEDICINE
Payer: MEDICARE

## 2020-01-01 ENCOUNTER — HOSPITAL ENCOUNTER (OUTPATIENT)
Dept: PET IMAGING | Age: 85
Discharge: HOME | End: 2020-08-17
Attending: INTERNAL MEDICINE
Payer: MEDICARE

## 2020-01-01 ENCOUNTER — OFFICE VISIT (OUTPATIENT)
Dept: CARDIOLOGY CLINIC | Age: 85
End: 2020-01-01

## 2020-01-01 ENCOUNTER — HOSPITAL ENCOUNTER (OUTPATIENT)
Dept: ULTRASOUND IMAGING | Age: 85
Discharge: HOME | End: 2020-09-04
Attending: INTERNAL MEDICINE
Payer: MEDICARE

## 2020-01-01 ENCOUNTER — HOSPITAL ENCOUNTER (OUTPATIENT)
Dept: GENERAL RADIOLOGY | Age: 85
Discharge: HOME | End: 2020-09-04
Attending: RADIOLOGY
Payer: MEDICARE

## 2020-01-01 ENCOUNTER — HOSPITAL ENCOUNTER (OUTPATIENT)
Dept: GENERAL RADIOLOGY | Age: 85
Discharge: HOME | End: 2020-08-24
Attending: STUDENT IN AN ORGANIZED HEALTH CARE EDUCATION/TRAINING PROGRAM
Payer: MEDICARE

## 2020-01-01 ENCOUNTER — HOSPITAL ENCOUNTER (OUTPATIENT)
Dept: ULTRASOUND IMAGING | Age: 85
Discharge: HOME | End: 2020-08-24
Attending: INTERNAL MEDICINE
Payer: MEDICARE

## 2020-01-01 ENCOUNTER — TRANSCRIBE ORDER (OUTPATIENT)
Dept: SCHEDULING | Age: 85
End: 2020-01-01

## 2020-01-01 VITALS
HEIGHT: 68 IN | DIASTOLIC BLOOD PRESSURE: 62 MMHG | BODY MASS INDEX: 24.71 KG/M2 | SYSTOLIC BLOOD PRESSURE: 110 MMHG | WEIGHT: 163 LBS | HEART RATE: 64 BPM | OXYGEN SATURATION: 97 % | RESPIRATION RATE: 16 BRPM

## 2020-01-01 VITALS
RESPIRATION RATE: 16 BRPM | HEIGHT: 70 IN | WEIGHT: 163 LBS | DIASTOLIC BLOOD PRESSURE: 66 MMHG | OXYGEN SATURATION: 100 % | SYSTOLIC BLOOD PRESSURE: 121 MMHG | BODY MASS INDEX: 23.34 KG/M2 | TEMPERATURE: 97.6 F | HEART RATE: 69 BPM

## 2020-01-01 VITALS — HEIGHT: 69 IN | BODY MASS INDEX: 24.14 KG/M2 | WEIGHT: 163 LBS

## 2020-01-01 VITALS
BODY MASS INDEX: 23.3 KG/M2 | HEIGHT: 69 IN | HEART RATE: 65 BPM | TEMPERATURE: 97.6 F | WEIGHT: 157.32 LBS | OXYGEN SATURATION: 100 % | SYSTOLIC BLOOD PRESSURE: 147 MMHG | RESPIRATION RATE: 18 BRPM | DIASTOLIC BLOOD PRESSURE: 75 MMHG

## 2020-01-01 VITALS — HEIGHT: 69 IN | BODY MASS INDEX: 23.4 KG/M2 | WEIGHT: 158 LBS

## 2020-01-01 DIAGNOSIS — C50.929: ICD-10-CM

## 2020-01-01 DIAGNOSIS — I48.91 ATRIAL FIBRILLATION, UNSPECIFIED TYPE (HCC): Primary | ICD-10-CM

## 2020-01-01 DIAGNOSIS — J91.0 MALIGNANT PLEURAL EFFUSION: ICD-10-CM

## 2020-01-01 DIAGNOSIS — R19.7 DIARRHEA OF PRESUMED INFECTIOUS ORIGIN: ICD-10-CM

## 2020-01-01 DIAGNOSIS — R91.8 LUNG MASS: ICD-10-CM

## 2020-01-01 DIAGNOSIS — Z09 CHEMOTHERAPY FOLLOW-UP EXAMINATION: ICD-10-CM

## 2020-01-01 DIAGNOSIS — C50.929 MALIGNANT NEOPLASM OF MALE BREAST (HCC): ICD-10-CM

## 2020-01-01 DIAGNOSIS — C50.929 MALIGNANT NEOPLASM OF MALE BREAST (HCC): Primary | ICD-10-CM

## 2020-01-01 DIAGNOSIS — R91.8 OTHER NONSPECIFIC ABNORMAL FINDING OF LUNG FIELD: ICD-10-CM

## 2020-01-01 LAB
GLUCOSE BLD STRIP.AUTO-MCNC: 102 MG/DL (ref 65–100)
SERVICE CMNT-IMP: ABNORMAL

## 2020-01-01 PROCEDURE — 74011000250 HC RX REV CODE- 250: Performed by: INTERNAL MEDICINE

## 2020-01-01 PROCEDURE — 71045 X-RAY EXAM CHEST 1 VIEW: CPT

## 2020-01-01 PROCEDURE — 32555 ASPIRATE PLEURA W/ IMAGING: CPT

## 2020-01-01 PROCEDURE — A9552 F18 FDG: HCPCS

## 2020-01-01 PROCEDURE — 74011000250 HC RX REV CODE- 250: Performed by: RADIOLOGY

## 2020-01-01 RX ORDER — DIGOXIN 250 MCG
TABLET ORAL
Qty: 90 TAB | Refills: 1 | Status: SHIPPED | OUTPATIENT
Start: 2020-01-01 | End: 2021-01-01

## 2020-01-01 RX ORDER — LIDOCAINE HYDROCHLORIDE 10 MG/ML
8 INJECTION, SOLUTION EPIDURAL; INFILTRATION; INTRACAUDAL; PERINEURAL
Status: COMPLETED | OUTPATIENT
Start: 2020-01-01 | End: 2020-01-01

## 2020-01-01 RX ORDER — SODIUM CHLORIDE 0.9 % (FLUSH) 0.9 %
10 SYRINGE (ML) INJECTION
Status: COMPLETED | OUTPATIENT
Start: 2020-01-01 | End: 2020-01-01

## 2020-01-01 RX ORDER — LIDOCAINE HYDROCHLORIDE 10 MG/ML
10 INJECTION, SOLUTION EPIDURAL; INFILTRATION; INTRACAUDAL; PERINEURAL
Status: COMPLETED | OUTPATIENT
Start: 2020-01-01 | End: 2020-01-01

## 2020-01-01 RX ADMIN — LIDOCAINE HYDROCHLORIDE 8 ML: 10 INJECTION, SOLUTION EPIDURAL; INFILTRATION; INTRACAUDAL; PERINEURAL at 16:00

## 2020-01-01 RX ADMIN — LIDOCAINE HYDROCHLORIDE 10 ML: 10 INJECTION, SOLUTION EPIDURAL; INFILTRATION; INTRACAUDAL; PERINEURAL at 10:00

## 2020-01-01 RX ADMIN — Medication 10 ML: at 13:10

## 2020-01-01 RX ADMIN — Medication 10 ML: at 11:50

## 2020-04-02 ENCOUNTER — TELEPHONE (OUTPATIENT)
Dept: CARDIOLOGY CLINIC | Age: 85
End: 2020-04-02

## 2020-04-16 RX ORDER — DIGOXIN 250 MCG
TABLET ORAL
Qty: 90 TAB | Refills: 0 | Status: SHIPPED | OUTPATIENT
Start: 2020-04-16 | End: 2020-01-01

## 2020-04-23 ENCOUNTER — HOSPITAL ENCOUNTER (OUTPATIENT)
Dept: GENERAL RADIOLOGY | Age: 85
Discharge: HOME | End: 2020-04-23
Payer: MEDICARE

## 2020-04-23 DIAGNOSIS — J91.0 MALIGNANT PLEURAL EFFUSION: ICD-10-CM

## 2020-04-23 DIAGNOSIS — R91.8 LUNG MASS: ICD-10-CM

## 2020-04-23 DIAGNOSIS — C50.929 MALIGNANT NEOPLASM OF MALE BREAST (HCC): ICD-10-CM

## 2020-04-23 PROCEDURE — 71046 X-RAY EXAM CHEST 2 VIEWS: CPT

## 2020-07-28 NOTE — PROGRESS NOTES
[facility]                                                            OFFICE NOTE        Noé Altamirano M.D.,MAYUR. Mary Jane NOGUEIRA III   6/5/1932  [id number]    Date/Time:  7/28/202011:06 AM        ICD-10-CM ICD-9-CM    1. Atrial fibrillation, unspecified type (HCC)  I48.91 427.31 AMB POC EKG ROUTINE W/ 12 LEADS, INTER & REP         SUBJECTIVE:  He is doing well cardiac wise no cp or sob or palpitations or dizziness reported  He remains active       Assessment/Plan  1. Atrial fibrillation: Chronic. Rate is controlled on digoxin. He has had a normal ejection fraction by echocardiogram of May 2019. He is now off Eliquis on account of recurrent hematuria. He will continue with aspirin alone for now. Discussed at length with him in the past risk of CVA with atrial fibrillation and off oral anticoagulation.      His electrocardiogram today is unchanged with atrial fibrillation rate control, nonspecific ST-T wave abnormalities. ,     2.  Hypertension: well controlled     3. Hyperlipidemia: Very well controlled and closely followed by his primary care physician.     4.  Hematuria: Not recurrent. He does have an elevated PSA and is closely followed by urology.     5.  Breast CA in male: Status post recurrent left pleural effusion for thoracentesis. I have asked him to let me know if shortness of breath does not improve in which case we may need to consider repeating echocardiogram.     6. Abdominal aortic aneurysm: Closely followed by vascular surgeon.     No additional interventions for now he will let me know if any issues arise otherwise I will see him back in 6 months           HPI    80 y.o. male. with a past medical history remarkable for prostate cancer diagnosed approximately in 36, for which he has undergone prostatectomy, radiation therapy at the level of the pelvis.  He also has a history of left breast cancer, for which he has undergone left mastectomy in 2004, chest radiation and chemotherapy. He denies any previous history of hypertension, hyperlipidemia, diabetes or coronary artery disease. He was seen in the hospital at [facility] in May 2008 for dizziness. He was found to have atrial fibrillation. On account of that, he underwent JOHN followed by DC cardioversion. To be noted that the ejection fraction was estimated at 60% at that time. He only had trace to mild mitral regurgitation and tricuspid regurgitation. He converted back to normal sinus rhythm promptly after cardioversion. He has had recurrence of atrial fibrillation and has persistent atrial fibrillation. Echo on 9/16: Systolic function was normal. Ejection fraction was  estimated in the range of 60 % to 65 %. There were no regional wall motion  abnormalities. Left atrium: The atrium was mildly dilated. Mitral valve: There was mild annular calcification. Tricuspid valve: There was mild regurgitation. ECHO on 5/18:Left ventricle: Systolic function was normal. Ejection fraction was  estimated to be 63 % by Huerta's biplane technique. There were no  regional wall motion abnormalities. There was mild concentric hypertrophy. Left atrium: The atrium was moderately dilated. Right atrium: The atrium was moderately dilated. Mitral valve: There was mild annular calcification. Tricuspid valve: There was mild to nearing moderate regurgitation. Pulmonary artery systolic pressure: 35 mmHg. Pulmonic valve: There was mild regurgitation.          04/29/19   ECHO ADULT COMPLETE 04/30/2019 4/30/2019     Narrative · Calculated left ventricular ejection fraction is 61%. Biplane method   used to measure ejection fraction. No regional wall motion abnormality   noted. · There is a moderate left pleural effusion. · Left atrial cavity size is mildly dilated.   · Moderate aortic valve sclerosis with no significant stenosis. · Mild mitral annular calcification. Mild mitral valve regurgitation. · Tricuspid Valve: Mild tricuspid valve regurgitation is present. · Pulmonary Artery: There is no evidence of pulmonary hypertension.          Signed by: Catalina Goldstein MD                   CARDIAC STUDIES        04/29/19   ECHO ADULT COMPLETE 04/30/2019 4/30/2019    Narrative · Calculated left ventricular ejection fraction is 61%. Biplane method   used to measure ejection fraction. No regional wall motion abnormality   noted. · There is a moderate left pleural effusion. · Left atrial cavity size is mildly dilated. · Moderate aortic valve sclerosis with no significant stenosis. · Mild mitral annular calcification. Mild mitral valve regurgitation. · Tricuspid Valve: Mild tricuspid valve regurgitation is present. · Pulmonary Artery: There is no evidence of pulmonary hypertension. Signed by: Catalina Goldstein MD                                 EKG Results     Procedure 720 Value Units Date/Time    AMB POC EKG ROUTINE W/ 12 LEADS, INTER & REP [[id number]] Resulted:  07/28/20 1031    Order Status:  Completed Updated:  07/28/20 1037              IMAGING      MRI Results (most recent):  No results found for this or any previous visit. CT Results (most recent):  No results found for this or any previous visit. XR Results (most recent):  Results from Hospital Encounter encounter on 04/23/20   XR CHEST PA LAT    Narrative Chest 4 views dated 4/23/2020    Comparison chest dated 11/8/2019    History is malignant neoplasm of female breast.    PA and lateral views of the chest were obtained. The cardiac silhouette is  enlarged. There is hyperaeration of the lungs. There is prominence of the  pulmonary interstitial markings (left greater than right). Some hazy density is  noted in the region of the left midlung. There is opacification of the left lung  base with obliteration of the left hemidiaphragm.  There is blunting of the left  costophrenic angle. This is compatible with the presence of a pleural effusion. There is some apparent loculation in the lateral aspect of the left midlung. Surgical clips project over the left axillary region. There is deviation of the  trachea towards the right. This was present at the time of the previous  examination. Impression IMPRESSION:  1. Evidence of cardiomegaly. Prominence of the pulmonary interstitial markings. No definite evidence of congestive change. 2. Presence of hazy density involving the left lung. Presence of a left pleural  effusion. Past Medical History:   Diagnosis Date    [facility]     Bladder cancer ([facility]     [facility]/26/2011    Breast cancer, male ([facility] 07/05/2012    Colon polyps 7/26/2011    Glucose intolerance (impaired glucose tolerance) 10/31/2013    Prostate [facility] 07/26/2011    Typhoid fever 7/26/2011     Past Surgical History:   Procedure Laterality Date    HX CATARACT REMOVAL      HX MASTECTOMY      HX POLYPECTOMY      HX PROSTATECTOMY      HX TONSILLECTOMY      IR THORACENTESIS CATH W IMAGE  11/8/2019     Social History     Tobacco Use    Smoking status: Former Smoker     Packs/day: 2.00    Smokeless tobacco: Never Used   Substance Use Topics    Alcohol use: Yes     Alcohol/week: 5.8 standard drinks     Types: 7 Standard drinks or equivalent per week     Comment: A drink at night    Drug use: No     No family history on file. No Known Allergies      Visit Vitals  /62 (BP 1 Location: Left arm, BP Patient Position: Sitting)   Pulse 64   Resp 16   Ht 5' 8\" (1.727 m)   Wt 73.9 kg (163 lb)   SpO2 97%   BMI 24.78 kg/m²         Last 3 Recorded Weights in this Encounter    07/28/20 1026   Weight: 73.9 kg (163 lb)            Review of Systems:   Pertinent items are noted in the History of Present Illness.        Neck: no JVD  Heart: irregularly irregular rhythm  Lungs: diminished breath sounds L apex, L base  Abdomen: soft, non-tender. Bowel sounds normal. No masses,  no organomegaly  Extremities: no edema      Current Outpatient Medications on File Prior to Visit   Medication Sig Dispense Refill    digoxin (Digox) 0.25 mg tablet TAKE 1 TABLET BY MOUTH EVERY DAY 90 Tab 1    NIFEdipine ER (ADALAT CC) 30 mg ER tablet TAKE 1 TABLET BY MOUTH DAILY 90 Tab 1    OTHER Monthly hormone injection.  letrozole (FEMARA) 2.5 mg tablet Take 2.5 mg by mouth daily.  aspirin delayed-release 81 mg tablet Take  by mouth daily.  CYANOCOBALAMIN, VITAMIN B-12, (VITAMIN B-12 PO) Take 3,000 mcg by mouth daily.  cholecalciferol (VITAMIN D3) 1,000 unit tablet Take 1,000 Units by mouth daily.  MULTIVITAMIN (MULTIPLE VITAMINS PO) Take  by mouth daily. No current facility-administered medications on file prior to visit. Masr Suggs. Andriy Whitleyazsima TABITHA had no medications administered during this visit. Current Outpatient Medications   Medication Sig    digoxin (Digox) 0.25 mg tablet TAKE 1 TABLET BY MOUTH EVERY DAY    NIFEdipine ER (ADALAT CC) 30 mg ER tablet TAKE 1 TABLET BY MOUTH DAILY    OTHER Monthly hormone injection.  letrozole (FEMARA) 2.5 mg tablet Take 2.5 mg by mouth daily.  aspirin delayed-release 81 mg tablet Take  by mouth daily.  CYANOCOBALAMIN, VITAMIN B-12, (VITAMIN B-12 PO) Take 3,000 mcg by mouth daily.  cholecalciferol (VITAMIN D3) 1,000 unit tablet Take 1,000 Units by mouth daily.  MULTIVITAMIN (MULTIPLE VITAMINS PO) Take  by mouth daily. No current facility-administered medications for this visit.           Lab Results   Component Value Date/Time    Cholesterol (POC) 148 10/11/2019 02:03 PM    HDL Cholesterol (POC) 79 10/11/2019 02:03 PM    LDL Cholesterol (POC) 52 10/11/2019 02:03 PM    Triglycerides (POC) 90 10/11/2019 02:03 PM       Lab Results   Component Value Date/Time    Sodium 141 10/11/2019 01:38 PM    Potassium 4.2 10/11/2019 01:38 PM    Chloride 100 10/11/2019 01:38 PM    CO2 27 10/11/2019 01:38 PM    Anion gap 8 08/19/2017 06:03 AM    Glucose 78 10/11/2019 01:38 PM    BUN 18 10/11/2019 01:38 PM    Creatinine 0.95 10/11/2019 01:38 PM    BUN/Creatinine ratio 19 10/11/2019 01:38 PM    GFR est AA 83 10/11/2019 01:38 PM    GFR est non-AA 72 10/11/2019 01:38 PM    Calcium 9.1 10/11/2019 01:38 PM       Lab Results   Component Value Date/Time    ALT (SGPT) 11 10/11/2019 01:38 PM    Alk. phosphatase 71 10/11/2019 01:38 PM    Bilirubin, total 0.3 10/11/2019 01:38 PM       Lab Results   Component Value Date/Time    WBC 3.4 (L) 08/19/2017 06:03 AM    HGB (POC) 15.3 10/11/2019 02:03 PM    HGB 14.4 08/19/2017 06:03 AM    HCT (POC) 46.4 10/11/2019 02:03 PM    HCT 42.1 08/19/2017 06:03 AM    PLATELET 211 (L) 55/80/2813 06:03 AM    .8 (H) 08/19/2017 06:03 AM       Lab Results   Component Value Date/Time    TSH 2.300 10/11/2019 01:38 PM         Lab Results   Component Value Date/Time    Cholesterol (POC) 148 10/11/2019 02:03 PM    Triglycerides (POC) 90 10/11/2019 02:03 PM                Please note that this dictation was completed with [employer], the [employer] voice recognition software. Quite often unanticipated grammatical, syntax, homophones, and other interpretative errors are inadvertently transcribed by the computer software. Please disregard these errors. Please excuse any errors that have escaped final proofreading.

## 2020-07-28 NOTE — PATIENT INSTRUCTIONS
Follow up with Dr. Glen Lubin in 6 months.     1 week prior to follow up, schedule an echocardiogram.

## 2020-07-28 NOTE — PROGRESS NOTES
Visit Vitals  /62 (BP 1 Location: Left arm, BP Patient Position: Sitting)   Pulse 64   Resp 16   Ht 5' 8\" (1.727 m)   Wt 163 lb (73.9 kg)   SpO2 97%   BMI 24.78 kg/m²

## 2020-08-24 NOTE — PROGRESS NOTES
Dr. Bonilla Edge at bedside to obtain consent for left thoracentesis. Procedure explained with opportunity to ask questions. Patient States understanding of procedure prior to signing consent. Discharge instructions reviewed patient. Patient states understanding of discharge instructions and expectations.

## 2020-08-24 NOTE — DISCHARGE INSTRUCTIONS
[address]  Radiology Department  [phone]    Radiologist: Rivas Rodriguez    Date: 08/24/2020      Thoracentesis Discharge Instructions    You may have an aching pain in the puncture site tonight as the numbing medicine wears off. You may take Tylenol, as directed on the label, for pain or discomfort. Avoid ibuprofen (Advil, Motrin) and aspirin products for the next 48 hours as these drugs may increase your chance of bleeding. You have develop a mild cough as the lungs re expand with air, this should resolve with in the next 24 hours. Resume your previous diet and continue your prescribed medicaitons. Rest for the next 24 hours. If you experience shortness of breath (other than your normal breathing pattern) difficulty breathing, or have severe chest pain, call 911 and go to the nearest Emergency Room.     Be sure to follow up with your referring physician as soon as possible    Other:

## 2020-08-24 NOTE — PROGRESS NOTES
Name of procedure: Thoracentesis left    Sedation medications given: None    Sedation tolerated: NA    Vital Signs: Stable    Fluids removed: 1500 clear mago fluid    Samples sent to lab: NO    Any complications related to procedure: No    Post Procedure Care Needed/order sets placed in [facility] care.

## 2020-09-04 NOTE — ROUTINE PROCESS
Received care of pt from lobby to radiology recovery area for US guided left thoracentesis. Pt prepped for procedure.

## 2020-09-04 NOTE — DISCHARGE INSTRUCTIONS
[address]  Radiology Department  [phone]    Radiologist: Dr. Megan Mcclelland    Date: 09/04/2020      Thoracentesis Discharge Instructions    You may have an aching pain in the puncture site tonight as the numbing medicine wears off. You may take Tylenol, as directed on the label, for pain or discomfort. Avoid ibuprofen (Advil, Motrin) and aspirin products for the next 48 hours as these drugs may increase your chance of bleeding. You have develop a mild cough as the lungs re expand with air, this should resolve with in the next 24 hours. Resume your previous diet and continue your prescribed medicaitons. Rest for the next 24 hours. If you experience shortness of breath (other than your normal breathing pattern) difficulty breathing, or have severe chest pain, call 911 and go to the nearest Emergency Room.     Be sure to follow up with your referring physician as soon as possible

## 2020-09-04 NOTE — ROUTINE PROCESS
Left thoracentesis completed with a total of 1200 ml of mago colored pleural fluid removed, awaiting chest xray.

## 2020-09-21 NOTE — TELEPHONE ENCOUNTER
Dr. Mando Gilliland would like to speak with Dr. Donald Hand in regards bradycardia and her reducing the patient's digoxin dose due to his levels being high. Please advise.     Phone #: [phone]  Thanks

## 2021-01-01 ENCOUNTER — HOSPITAL ENCOUNTER (INPATIENT)
Age: 86
LOS: 2 days | DRG: 951 | End: 2021-07-01
Attending: FAMILY MEDICINE | Admitting: FAMILY MEDICINE
Payer: OTHER MISCELLANEOUS

## 2021-01-01 ENCOUNTER — HOSPITAL ENCOUNTER (INPATIENT)
Age: 86
LOS: 4 days | Discharge: HOSPICE | DRG: 193 | End: 2021-06-29
Attending: EMERGENCY MEDICINE | Admitting: HOSPITALIST
Payer: MEDICARE

## 2021-01-01 ENCOUNTER — HOSPICE ADMISSION (OUTPATIENT)
Dept: HOSPICE | Facility: HOSPICE | Age: 86
End: 2021-01-01
Payer: MEDICARE

## 2021-01-01 ENCOUNTER — APPOINTMENT (OUTPATIENT)
Dept: CT IMAGING | Age: 86
DRG: 193 | End: 2021-01-01
Attending: HOSPITALIST
Payer: MEDICARE

## 2021-01-01 ENCOUNTER — HOSPITAL ENCOUNTER (OUTPATIENT)
Dept: ULTRASOUND IMAGING | Age: 86
Discharge: HOME | End: 2021-05-17
Attending: INTERNAL MEDICINE
Payer: MEDICARE

## 2021-01-01 ENCOUNTER — HOSPITAL ENCOUNTER (OUTPATIENT)
Dept: PET IMAGING | Age: 86
Discharge: HOME | End: 2021-05-18
Attending: INTERNAL MEDICINE
Payer: MEDICARE

## 2021-01-01 ENCOUNTER — APPOINTMENT (OUTPATIENT)
Dept: GENERAL RADIOLOGY | Age: 86
DRG: 193 | End: 2021-01-01
Attending: EMERGENCY MEDICINE
Payer: MEDICARE

## 2021-01-01 ENCOUNTER — TRANSCRIBE ORDER (OUTPATIENT)
Dept: SCHEDULING | Age: 86
End: 2021-01-01

## 2021-01-01 ENCOUNTER — TELEPHONE (OUTPATIENT)
Dept: CARDIOLOGY CLINIC | Age: 86
End: 2021-01-01

## 2021-01-01 ENCOUNTER — ANCILLARY PROCEDURE (OUTPATIENT)
Dept: CARDIOLOGY CLINIC | Age: 86
End: 2021-01-01
Payer: MEDICARE

## 2021-01-01 ENCOUNTER — HOSPITAL ENCOUNTER (OUTPATIENT)
Dept: GENERAL RADIOLOGY | Age: 86
Discharge: HOME | End: 2021-05-17
Attending: PHYSICIAN ASSISTANT
Payer: MEDICARE

## 2021-01-01 ENCOUNTER — APPOINTMENT (OUTPATIENT)
Dept: GENERAL RADIOLOGY | Age: 86
DRG: 193 | End: 2021-01-01
Attending: HOSPITALIST
Payer: MEDICARE

## 2021-01-01 ENCOUNTER — OFFICE VISIT (OUTPATIENT)
Dept: CARDIOLOGY CLINIC | Age: 86
End: 2021-01-01
Payer: MEDICARE

## 2021-01-01 ENCOUNTER — HOSPITAL ENCOUNTER (OUTPATIENT)
Dept: GENERAL RADIOLOGY | Age: 86
End: 2021-01-01
Attending: RADIOLOGY
Payer: MEDICARE

## 2021-01-01 ENCOUNTER — HOSPITAL ENCOUNTER (OUTPATIENT)
Dept: GENERAL RADIOLOGY | Age: 86
Discharge: HOME | End: 2021-05-18
Attending: RADIOLOGY
Payer: MEDICARE

## 2021-01-01 VITALS
DIASTOLIC BLOOD PRESSURE: 73 MMHG | TEMPERATURE: 97.9 F | OXYGEN SATURATION: 95 % | BODY MASS INDEX: 22.22 KG/M2 | HEIGHT: 69 IN | SYSTOLIC BLOOD PRESSURE: 125 MMHG | RESPIRATION RATE: 28 BRPM | WEIGHT: 150 LBS | HEART RATE: 70 BPM

## 2021-01-01 VITALS
WEIGHT: 152 LBS | BODY MASS INDEX: 22.51 KG/M2 | HEART RATE: 78 BPM | HEIGHT: 69 IN | DIASTOLIC BLOOD PRESSURE: 60 MMHG | OXYGEN SATURATION: 95 % | RESPIRATION RATE: 12 BRPM | SYSTOLIC BLOOD PRESSURE: 118 MMHG

## 2021-01-01 VITALS
TEMPERATURE: 98.3 F | WEIGHT: 143.3 LBS | HEART RATE: 96 BPM | BODY MASS INDEX: 21.22 KG/M2 | OXYGEN SATURATION: 82 % | RESPIRATION RATE: 14 BRPM | HEIGHT: 69 IN | SYSTOLIC BLOOD PRESSURE: 117 MMHG | DIASTOLIC BLOOD PRESSURE: 71 MMHG

## 2021-01-01 VITALS — WEIGHT: 152 LBS | BODY MASS INDEX: 22.51 KG/M2 | HEIGHT: 69 IN

## 2021-01-01 VITALS
OXYGEN SATURATION: 96 % | RESPIRATION RATE: 26 BRPM | HEART RATE: 72 BPM | DIASTOLIC BLOOD PRESSURE: 79 MMHG | SYSTOLIC BLOOD PRESSURE: 121 MMHG

## 2021-01-01 VITALS
SYSTOLIC BLOOD PRESSURE: 132 MMHG | OXYGEN SATURATION: 92 % | HEIGHT: 69 IN | RESPIRATION RATE: 34 BRPM | TEMPERATURE: 97.9 F | DIASTOLIC BLOOD PRESSURE: 77 MMHG | WEIGHT: 143.2 LBS | HEART RATE: 94 BPM | BODY MASS INDEX: 21.21 KG/M2

## 2021-01-01 VITALS — WEIGHT: 150 LBS | HEIGHT: 68 IN | BODY MASS INDEX: 22.73 KG/M2

## 2021-01-01 DIAGNOSIS — R45.1 RESTLESSNESS: ICD-10-CM

## 2021-01-01 DIAGNOSIS — R91.8 OTHER NONSPECIFIC ABNORMAL FINDING OF LUNG FIELD: ICD-10-CM

## 2021-01-01 DIAGNOSIS — R19.7 DIARRHEA, UNSPECIFIED: ICD-10-CM

## 2021-01-01 DIAGNOSIS — R52 GENERALIZED PAIN: ICD-10-CM

## 2021-01-01 DIAGNOSIS — J96.01 ACUTE RESPIRATORY FAILURE WITH HYPOXIA (HCC): ICD-10-CM

## 2021-01-01 DIAGNOSIS — R06.4 LABORED BREATHING: ICD-10-CM

## 2021-01-01 DIAGNOSIS — C50.929: Primary | ICD-10-CM

## 2021-01-01 DIAGNOSIS — J90 PLEURAL EFFUSION: Primary | ICD-10-CM

## 2021-01-01 DIAGNOSIS — Z51.5 HOSPICE CARE: ICD-10-CM

## 2021-01-01 DIAGNOSIS — F41.9 ANXIETY: ICD-10-CM

## 2021-01-01 DIAGNOSIS — R19.7 DIARRHEA, UNSPECIFIED TYPE: ICD-10-CM

## 2021-01-01 DIAGNOSIS — R06.82 TACHYPNEA: ICD-10-CM

## 2021-01-01 DIAGNOSIS — I48.91 ATRIAL FIBRILLATION, UNSPECIFIED TYPE (HCC): ICD-10-CM

## 2021-01-01 DIAGNOSIS — J91.0 MALIGNANT PLEURAL EFFUSION: ICD-10-CM

## 2021-01-01 DIAGNOSIS — C50.919 METASTATIC BREAST CANCER (HCC): ICD-10-CM

## 2021-01-01 DIAGNOSIS — R91.8 PULMONARY INFILTRATES: ICD-10-CM

## 2021-01-01 DIAGNOSIS — Z09 CHEMOTHERAPY FOLLOW-UP EXAMINATION: ICD-10-CM

## 2021-01-01 DIAGNOSIS — R53.83 LETHARGY: ICD-10-CM

## 2021-01-01 DIAGNOSIS — C50.929: ICD-10-CM

## 2021-01-01 DIAGNOSIS — I48.91 ATRIAL FIBRILLATION, UNSPECIFIED TYPE (HCC): Primary | ICD-10-CM

## 2021-01-01 LAB
ALBUMIN SERPL-MCNC: 2.8 G/DL (ref 3.5–5)
ALBUMIN SERPL-MCNC: 3.4 G/DL (ref 3.5–5)
ALBUMIN/GLOB SERPL: 0.8 {RATIO} (ref 1.1–2.2)
ALBUMIN/GLOB SERPL: 0.8 {RATIO} (ref 1.1–2.2)
ALP SERPL-CCNC: 103 U/L (ref 45–117)
ALP SERPL-CCNC: 86 U/L (ref 45–117)
ALT SERPL-CCNC: 13 U/L (ref 12–78)
ALT SERPL-CCNC: 19 U/L (ref 12–78)
ANION GAP SERPL CALC-SCNC: 4 MMOL/L (ref 5–15)
ANION GAP SERPL CALC-SCNC: 5 MMOL/L (ref 5–15)
ANION GAP SERPL CALC-SCNC: 6 MMOL/L (ref 5–15)
AST SERPL-CCNC: 18 U/L (ref 15–37)
AST SERPL-CCNC: 19 U/L (ref 15–37)
ATRIAL RATE: 96 BPM
BACTERIA SPEC CULT: NORMAL
BASOPHILS # BLD: 0 K/UL (ref 0–0.1)
BASOPHILS # BLD: 0 K/UL (ref 0–0.1)
BASOPHILS # BLD: 0.1 K/UL (ref 0–0.1)
BASOPHILS NFR BLD: 1 % (ref 0–1)
BILIRUB SERPL-MCNC: 0.9 MG/DL (ref 0.2–1)
BILIRUB SERPL-MCNC: 1 MG/DL (ref 0.2–1)
BNP SERPL-MCNC: 1696 PG/ML
BNP SERPL-MCNC: 3595 PG/ML
BUN SERPL-MCNC: 19 MG/DL (ref 6–20)
BUN SERPL-MCNC: 20 MG/DL (ref 6–20)
BUN SERPL-MCNC: 23 MG/DL (ref 6–20)
BUN/CREAT SERPL: 20 (ref 12–20)
BUN/CREAT SERPL: 29 (ref 12–20)
BUN/CREAT SERPL: 31 (ref 12–20)
CALCIUM SERPL-MCNC: 8.7 MG/DL (ref 8.5–10.1)
CALCIUM SERPL-MCNC: 8.9 MG/DL (ref 8.5–10.1)
CALCIUM SERPL-MCNC: 9.1 MG/DL (ref 8.5–10.1)
CALCULATED R AXIS, ECG10: 32 DEGREES
CALCULATED T AXIS, ECG11: -55 DEGREES
CHLORIDE SERPL-SCNC: 101 MMOL/L (ref 97–108)
CHLORIDE SERPL-SCNC: 101 MMOL/L (ref 97–108)
CHLORIDE SERPL-SCNC: 104 MMOL/L (ref 97–108)
CO2 SERPL-SCNC: 26 MMOL/L (ref 21–32)
CO2 SERPL-SCNC: 29 MMOL/L (ref 21–32)
CO2 SERPL-SCNC: 33 MMOL/L (ref 21–32)
COMMENT, HOLDF: NORMAL
COMMENT, HOLDF: NORMAL
COVID-19 RAPID TEST, COVR: NOT DETECTED
CREAT SERPL-MCNC: 0.61 MG/DL (ref 0.7–1.3)
CREAT SERPL-MCNC: 0.79 MG/DL (ref 0.7–1.3)
CREAT SERPL-MCNC: 0.99 MG/DL (ref 0.7–1.3)
CRP SERPL-MCNC: 26.4 MG/DL (ref 0–0.6)
D DIMER PPP FEU-MCNC: 16.54 MG/L FEU (ref 0–0.65)
DIAGNOSIS, 93000: NORMAL
DIFFERENTIAL METHOD BLD: ABNORMAL
ECHO AO ASC DIAM: 3.56 CM
ECHO AO ROOT DIAM: 3.32 CM
ECHO AV AREA PEAK VELOCITY: 1.93 CM2
ECHO AV AREA VTI: 2.66 CM2
ECHO AV AREA/BSA PEAK VELOCITY: 1 CM2/M2
ECHO AV AREA/BSA VTI: 1.4 CM2/M2
ECHO AV MEAN GRADIENT: 9.2 MMHG
ECHO AV PEAK GRADIENT: 16.76 MMHG
ECHO AV PEAK VELOCITY: 204.71 CM/S
ECHO AV VTI: 28.03 CM
ECHO IVC PROX: 1.77 CM
ECHO LA AREA 4C: 28.93 CM2
ECHO LA MAJOR AXIS: 4.24 CM
ECHO LA MINOR AXIS: 2.31 CM
ECHO LA VOL 2C: 81.07 ML (ref 18–58)
ECHO LA VOL 4C: 101.37 ML (ref 18–58)
ECHO LA VOL BP: 98.74 ML (ref 18–58)
ECHO LA VOL/BSA BIPLANE: 53.71 ML/M2 (ref 16–28)
ECHO LA VOLUME INDEX A2C: 44.1 ML/M2 (ref 16–28)
ECHO LA VOLUME INDEX A4C: 55.15 ML/M2 (ref 16–28)
ECHO LV EDV A2C: 69.29 ML
ECHO LV EDV A4C: 49.95 ML
ECHO LV EDV BP: 58.99 ML (ref 67–155)
ECHO LV EDV INDEX A4C: 27.2 ML/M2
ECHO LV EDV INDEX BP: 32.1 ML/M2
ECHO LV EDV NDEX A2C: 37.7 ML/M2
ECHO LV EJECTION FRACTION A2C: 72 PERCENT
ECHO LV EJECTION FRACTION A4C: 80 PERCENT
ECHO LV EJECTION FRACTION BIPLANE: 76 PERCENT (ref 55–100)
ECHO LV ESV A2C: 19.13 ML
ECHO LV ESV A4C: 9.93 ML
ECHO LV ESV BP: 14.18 ML (ref 22–58)
ECHO LV ESV INDEX A2C: 10.4 ML/M2
ECHO LV ESV INDEX A4C: 5.4 ML/M2
ECHO LV ESV INDEX BP: 7.7 ML/M2
ECHO LV INTERNAL DIMENSION DIASTOLIC: 4.6 CM (ref 4.2–5.9)
ECHO LV INTERNAL DIMENSION SYSTOLIC: 2.83 CM
ECHO LV IVSD: 1.16 CM (ref 0.6–1)
ECHO LV MASS 2D: 216.1 G (ref 88–224)
ECHO LV MASS INDEX 2D: 117.6 G/M2 (ref 49–115)
ECHO LV POSTERIOR WALL DIASTOLIC: 1.33 CM (ref 0.6–1)
ECHO LVOT DIAM: 2.5 CM
ECHO LVOT PEAK GRADIENT: 2.61 MMHG
ECHO LVOT PEAK VELOCITY: 80.79 CM/S
ECHO LVOT SV: 74.7 ML
ECHO LVOT VTI: 15.27 CM
ECHO PV MAX VELOCITY: 72.62 CM/S
ECHO PV PEAK INSTANTANEOUS GRADIENT SYSTOLIC: 2.11 MMHG
ECHO PV REGURGITANT MAX VELOCITY: 131.42 CM/S
ECHO RA MINOR AXIS: 5.19 CM
ECHO RV TAPSE: 1.42 CM (ref 1.5–2)
ECHO TV REGURGITANT MAX VELOCITY: 330.5 CM/S
ECHO TV REGURGITANT PEAK GRADIENT: 43.69 MMHG
EOSINOPHIL # BLD: 0 K/UL (ref 0–0.4)
EOSINOPHIL # BLD: 0 K/UL (ref 0–0.4)
EOSINOPHIL # BLD: 0.1 K/UL (ref 0–0.4)
EOSINOPHIL NFR BLD: 0 % (ref 0–7)
EOSINOPHIL NFR BLD: 0 % (ref 0–7)
EOSINOPHIL NFR BLD: 2 % (ref 0–7)
ERYTHROCYTE [DISTWIDTH] IN BLOOD BY AUTOMATED COUNT: 13 % (ref 11.5–14.5)
ERYTHROCYTE [DISTWIDTH] IN BLOOD BY AUTOMATED COUNT: 13.4 % (ref 11.5–14.5)
ERYTHROCYTE [DISTWIDTH] IN BLOOD BY AUTOMATED COUNT: 13.5 % (ref 11.5–14.5)
GLOBULIN SER CALC-MCNC: 3.7 G/DL (ref 2–4)
GLOBULIN SER CALC-MCNC: 4.1 G/DL (ref 2–4)
GLUCOSE BLD STRIP.AUTO-MCNC: 98 MG/DL (ref 65–117)
GLUCOSE SERPL-MCNC: 116 MG/DL (ref 65–100)
GLUCOSE SERPL-MCNC: 117 MG/DL (ref 65–100)
GLUCOSE SERPL-MCNC: 182 MG/DL (ref 65–100)
HCT VFR BLD AUTO: 30.3 % (ref 36.6–50.3)
HCT VFR BLD AUTO: 30.8 % (ref 36.6–50.3)
HCT VFR BLD AUTO: 33.2 % (ref 36.6–50.3)
HGB BLD-MCNC: 10.1 G/DL (ref 12.1–17)
HGB BLD-MCNC: 10.2 G/DL (ref 12.1–17)
HGB BLD-MCNC: 11.1 G/DL (ref 12.1–17)
IMM GRANULOCYTES # BLD AUTO: 0 K/UL (ref 0–0.04)
IMM GRANULOCYTES # BLD AUTO: 0 K/UL (ref 0–0.04)
IMM GRANULOCYTES # BLD AUTO: 0.1 K/UL (ref 0–0.04)
IMM GRANULOCYTES NFR BLD AUTO: 1 % (ref 0–0.5)
LA VOL DISK BP: 91.69 ML (ref 18–58)
LACTATE SERPL-SCNC: 1.4 MMOL/L (ref 0.4–2)
LYMPHOCYTES # BLD: 0.1 K/UL (ref 0.8–3.5)
LYMPHOCYTES NFR BLD: 2 % (ref 12–49)
LYMPHOCYTES NFR BLD: 3 % (ref 12–49)
LYMPHOCYTES NFR BLD: 3 % (ref 12–49)
MAGNESIUM SERPL-MCNC: 2.1 MG/DL (ref 1.6–2.4)
MCH RBC QN AUTO: 40.2 PG (ref 26–34)
MCH RBC QN AUTO: 40.2 PG (ref 26–34)
MCH RBC QN AUTO: 40.4 PG (ref 26–34)
MCHC RBC AUTO-ENTMCNC: 33.1 G/DL (ref 30–36.5)
MCHC RBC AUTO-ENTMCNC: 33.3 G/DL (ref 30–36.5)
MCHC RBC AUTO-ENTMCNC: 33.4 G/DL (ref 30–36.5)
MCV RBC AUTO: 120.3 FL (ref 80–99)
MCV RBC AUTO: 121.2 FL (ref 80–99)
MCV RBC AUTO: 121.3 FL (ref 80–99)
MONOCYTES # BLD: 0.3 K/UL (ref 0–1)
MONOCYTES # BLD: 0.4 K/UL (ref 0–1)
MONOCYTES # BLD: 0.4 K/UL (ref 0–1)
MONOCYTES NFR BLD: 10 % (ref 5–13)
MONOCYTES NFR BLD: 8 % (ref 5–13)
MONOCYTES NFR BLD: 8 % (ref 5–13)
NEUTS SEG # BLD: 3.3 K/UL (ref 1.8–8)
NEUTS SEG # BLD: 3.9 K/UL (ref 1.8–8)
NEUTS SEG # BLD: 4.2 K/UL (ref 1.8–8)
NEUTS SEG NFR BLD: 83 % (ref 32–75)
NEUTS SEG NFR BLD: 87 % (ref 32–75)
NEUTS SEG NFR BLD: 88 % (ref 32–75)
NRBC # BLD: 0 K/UL (ref 0–0.01)
NRBC BLD-RTO: 0 PER 100 WBC
PLATELET # BLD AUTO: 113 K/UL (ref 150–400)
PLATELET # BLD AUTO: 115 K/UL (ref 150–400)
PLATELET # BLD AUTO: 150 K/UL (ref 150–400)
PMV BLD AUTO: 10.1 FL (ref 8.9–12.9)
PMV BLD AUTO: 10.1 FL (ref 8.9–12.9)
PMV BLD AUTO: 9.8 FL (ref 8.9–12.9)
POTASSIUM SERPL-SCNC: 3.6 MMOL/L (ref 3.5–5.1)
POTASSIUM SERPL-SCNC: 4.1 MMOL/L (ref 3.5–5.1)
POTASSIUM SERPL-SCNC: 4.3 MMOL/L (ref 3.5–5.1)
PROCALCITONIN SERPL-MCNC: 0.94 NG/ML
PROT SERPL-MCNC: 6.5 G/DL (ref 6.4–8.2)
PROT SERPL-MCNC: 7.5 G/DL (ref 6.4–8.2)
Q-T INTERVAL, ECG07: 326 MS
QRS DURATION, ECG06: 88 MS
QTC CALCULATION (BEZET), ECG08: 396 MS
RBC # BLD AUTO: 2.5 M/UL (ref 4.1–5.7)
RBC # BLD AUTO: 2.54 M/UL (ref 4.1–5.7)
RBC # BLD AUTO: 2.76 M/UL (ref 4.1–5.7)
RBC MORPH BLD: ABNORMAL
SAMPLES BEING HELD,HOLD: NORMAL
SAMPLES BEING HELD,HOLD: NORMAL
SARS-COV-2, COV2: NORMAL
SARS-COV-2, XPLCVT: NOT DETECTED
SERVICE CMNT-IMP: NORMAL
SERVICE CMNT-IMP: NORMAL
SODIUM SERPL-SCNC: 135 MMOL/L (ref 136–145)
SODIUM SERPL-SCNC: 136 MMOL/L (ref 136–145)
SODIUM SERPL-SCNC: 138 MMOL/L (ref 136–145)
SOURCE, COVRS: NORMAL
SOURCE, COVRS: NORMAL
TROPONIN I SERPL-MCNC: <0.05 NG/ML
VENTRICULAR RATE, ECG03: 89 BPM
WBC # BLD AUTO: 3.9 K/UL (ref 4.1–11.1)
WBC # BLD AUTO: 4.3 K/UL (ref 4.1–11.1)
WBC # BLD AUTO: 4.9 K/UL (ref 4.1–11.1)

## 2021-01-01 PROCEDURE — 71045 X-RAY EXAM CHEST 1 VIEW: CPT

## 2021-01-01 PROCEDURE — 74011250636 HC RX REV CODE- 250/636: Performed by: HOSPITALIST

## 2021-01-01 PROCEDURE — 93005 ELECTROCARDIOGRAM TRACING: CPT | Performed by: SPECIALIST

## 2021-01-01 PROCEDURE — 74011000258 HC RX REV CODE- 258: Performed by: HOSPITALIST

## 2021-01-01 PROCEDURE — U0005 INFEC AGEN DETEC AMPLI PROBE: HCPCS

## 2021-01-01 PROCEDURE — 85025 COMPLETE CBC W/AUTO DIFF WBC: CPT

## 2021-01-01 PROCEDURE — 74011250636 HC RX REV CODE- 250/636: Performed by: EMERGENCY MEDICINE

## 2021-01-01 PROCEDURE — 85379 FIBRIN DEGRADATION QUANT: CPT

## 2021-01-01 PROCEDURE — 65660000001 HC RM ICU INTERMED STEPDOWN

## 2021-01-01 PROCEDURE — 84145 PROCALCITONIN (PCT): CPT

## 2021-01-01 PROCEDURE — 99285 EMERGENCY DEPT VISIT HI MDM: CPT

## 2021-01-01 PROCEDURE — C1729 CATH, DRAINAGE: HCPCS

## 2021-01-01 PROCEDURE — 74011000250 HC RX REV CODE- 250: Performed by: HOSPITALIST

## 2021-01-01 PROCEDURE — G0463 HOSPITAL OUTPT CLINIC VISIT: HCPCS | Performed by: SPECIALIST

## 2021-01-01 PROCEDURE — 86140 C-REACTIVE PROTEIN: CPT

## 2021-01-01 PROCEDURE — 77010033711 HC HIGH FLOW OXYGEN

## 2021-01-01 PROCEDURE — 74011250636 HC RX REV CODE- 250/636: Performed by: PHYSICIAN ASSISTANT

## 2021-01-01 PROCEDURE — 74011000258 HC RX REV CODE- 258: Performed by: EMERGENCY MEDICINE

## 2021-01-01 PROCEDURE — 93005 ELECTROCARDIOGRAM TRACING: CPT

## 2021-01-01 PROCEDURE — 94760 N-INVAS EAR/PLS OXIMETRY 1: CPT

## 2021-01-01 PROCEDURE — 74011250636 HC RX REV CODE- 250/636: Performed by: FAMILY MEDICINE

## 2021-01-01 PROCEDURE — 74011000636 HC RX REV CODE- 636: Performed by: RADIOLOGY

## 2021-01-01 PROCEDURE — 87635 SARS-COV-2 COVID-19 AMP PRB: CPT

## 2021-01-01 PROCEDURE — 3336500001 HSPC ELECTION

## 2021-01-01 PROCEDURE — 80053 COMPREHEN METABOLIC PANEL: CPT

## 2021-01-01 PROCEDURE — 78815 PET IMAGE W/CT SKULL-THIGH: CPT

## 2021-01-01 PROCEDURE — 87040 BLOOD CULTURE FOR BACTERIA: CPT

## 2021-01-01 PROCEDURE — G8427 DOCREV CUR MEDS BY ELIG CLIN: HCPCS | Performed by: SPECIALIST

## 2021-01-01 PROCEDURE — G8432 DEP SCR NOT DOC, RNG: HCPCS | Performed by: SPECIALIST

## 2021-01-01 PROCEDURE — 77010033678 HC OXYGEN DAILY

## 2021-01-01 PROCEDURE — G8420 CALC BMI NORM PARAMETERS: HCPCS | Performed by: SPECIALIST

## 2021-01-01 PROCEDURE — 83880 ASSAY OF NATRIURETIC PEPTIDE: CPT

## 2021-01-01 PROCEDURE — 74011250637 HC RX REV CODE- 250/637: Performed by: HOSPITALIST

## 2021-01-01 PROCEDURE — 99214 OFFICE O/P EST MOD 30 MIN: CPT | Performed by: SPECIALIST

## 2021-01-01 PROCEDURE — 36415 COLL VENOUS BLD VENIPUNCTURE: CPT

## 2021-01-01 PROCEDURE — A9552 F18 FDG: HCPCS

## 2021-01-01 PROCEDURE — 0656 HSPC GENERAL INPATIENT

## 2021-01-01 PROCEDURE — 80048 BASIC METABOLIC PNL TOTAL CA: CPT

## 2021-01-01 PROCEDURE — 32555 ASPIRATE PLEURA W/ IMAGING: CPT

## 2021-01-01 PROCEDURE — 99223 1ST HOSP IP/OBS HIGH 75: CPT | Performed by: FAMILY MEDICINE

## 2021-01-01 PROCEDURE — 93306 TTE W/DOPPLER COMPLETE: CPT | Performed by: SPECIALIST

## 2021-01-01 PROCEDURE — 77030040212 HC SYS EVAC ASEPT DISP BBMI -A

## 2021-01-01 PROCEDURE — 74011000250 HC RX REV CODE- 250: Performed by: FAMILY MEDICINE

## 2021-01-01 PROCEDURE — 74011250637 HC RX REV CODE- 250/637: Performed by: FAMILY MEDICINE

## 2021-01-01 PROCEDURE — 71275 CT ANGIOGRAPHY CHEST: CPT

## 2021-01-01 PROCEDURE — 83735 ASSAY OF MAGNESIUM: CPT

## 2021-01-01 PROCEDURE — 65270000029 HC RM PRIVATE

## 2021-01-01 PROCEDURE — 99232 SBSQ HOSP IP/OBS MODERATE 35: CPT | Performed by: FAMILY MEDICINE

## 2021-01-01 PROCEDURE — 99233 SBSQ HOSP IP/OBS HIGH 50: CPT | Performed by: FAMILY MEDICINE

## 2021-01-01 PROCEDURE — 84484 ASSAY OF TROPONIN QUANT: CPT

## 2021-01-01 PROCEDURE — 1101F PT FALLS ASSESS-DOCD LE1/YR: CPT | Performed by: SPECIALIST

## 2021-01-01 PROCEDURE — G8536 NO DOC ELDER MAL SCRN: HCPCS | Performed by: SPECIALIST

## 2021-01-01 PROCEDURE — 93010 ELECTROCARDIOGRAM REPORT: CPT | Performed by: SPECIALIST

## 2021-01-01 PROCEDURE — 83605 ASSAY OF LACTIC ACID: CPT

## 2021-01-01 RX ORDER — LEVOFLOXACIN 5 MG/ML
750 INJECTION, SOLUTION INTRAVENOUS
Status: COMPLETED | OUTPATIENT
Start: 2021-01-01 | End: 2021-01-01

## 2021-01-01 RX ORDER — DIGOXIN 250 MCG
0.25 TABLET ORAL DAILY
Status: DISCONTINUED | OUTPATIENT
Start: 2021-01-01 | End: 2021-01-01 | Stop reason: HOSPADM

## 2021-01-01 RX ORDER — MORPHINE SULFATE 2 MG/ML
3 INJECTION, SOLUTION INTRAMUSCULAR; INTRAVENOUS
Status: DISCONTINUED | OUTPATIENT
Start: 2021-01-01 | End: 2021-01-01 | Stop reason: HOSPADM

## 2021-01-01 RX ORDER — FUROSEMIDE 20 MG/1
20 TABLET ORAL 2 TIMES DAILY
Status: DISCONTINUED | OUTPATIENT
Start: 2021-01-01 | End: 2021-01-01

## 2021-01-01 RX ORDER — POLYETHYLENE GLYCOL 3350 17 G/17G
17 POWDER, FOR SOLUTION ORAL DAILY PRN
Status: DISCONTINUED | OUTPATIENT
Start: 2021-01-01 | End: 2021-01-01

## 2021-01-01 RX ORDER — SODIUM CHLORIDE 0.9 % (FLUSH) 0.9 %
5 SYRINGE (ML) INJECTION AS NEEDED
Status: DISCONTINUED | OUTPATIENT
Start: 2021-01-01 | End: 2021-01-01 | Stop reason: HOSPADM

## 2021-01-01 RX ORDER — LORAZEPAM 2 MG/ML
1 INJECTION INTRAMUSCULAR
Status: DISCONTINUED | OUTPATIENT
Start: 2021-01-01 | End: 2021-01-01 | Stop reason: HOSPADM

## 2021-01-01 RX ORDER — ACETAMINOPHEN 650 MG/1
650 SUPPOSITORY RECTAL
Status: DISCONTINUED | OUTPATIENT
Start: 2021-01-01 | End: 2021-01-01 | Stop reason: HOSPADM

## 2021-01-01 RX ORDER — MORPHINE SULFATE 2 MG/ML
2 INJECTION, SOLUTION INTRAMUSCULAR; INTRAVENOUS EVERY 4 HOURS
Status: DISCONTINUED | OUTPATIENT
Start: 2021-01-01 | End: 2021-01-01

## 2021-01-01 RX ORDER — ASPIRIN 81 MG/1
81 TABLET ORAL DAILY
Status: DISCONTINUED | OUTPATIENT
Start: 2021-01-01 | End: 2021-01-01 | Stop reason: HOSPADM

## 2021-01-01 RX ORDER — AMOXICILLIN 250 MG
2 CAPSULE ORAL
Status: DISCONTINUED | OUTPATIENT
Start: 2021-01-01 | End: 2021-01-01 | Stop reason: HOSPADM

## 2021-01-01 RX ORDER — FUROSEMIDE 10 MG/ML
20 INJECTION INTRAMUSCULAR; INTRAVENOUS 2 TIMES DAILY
Status: DISCONTINUED | OUTPATIENT
Start: 2021-01-01 | End: 2021-01-01 | Stop reason: HOSPADM

## 2021-01-01 RX ORDER — ONDANSETRON 2 MG/ML
4 INJECTION INTRAMUSCULAR; INTRAVENOUS
Status: DISCONTINUED | OUTPATIENT
Start: 2021-01-01 | End: 2021-01-01 | Stop reason: HOSPADM

## 2021-01-01 RX ORDER — SODIUM CHLORIDE 0.9 % (FLUSH) 0.9 %
5-40 SYRINGE (ML) INJECTION EVERY 8 HOURS
Status: DISCONTINUED | OUTPATIENT
Start: 2021-01-01 | End: 2021-01-01 | Stop reason: HOSPADM

## 2021-01-01 RX ORDER — BACITRACIN 500 UNIT/G
1 PACKET (EA) TOPICAL 2 TIMES DAILY
Status: DISCONTINUED | OUTPATIENT
Start: 2021-01-01 | End: 2021-01-01 | Stop reason: HOSPADM

## 2021-01-01 RX ORDER — OMEPRAZOLE 20 MG/1
20 TABLET, DELAYED RELEASE ORAL DAILY
COMMUNITY

## 2021-01-01 RX ORDER — GLYCOPYRROLATE 0.2 MG/ML
0.2 INJECTION INTRAMUSCULAR; INTRAVENOUS
Status: DISCONTINUED | OUTPATIENT
Start: 2021-01-01 | End: 2021-01-01 | Stop reason: HOSPADM

## 2021-01-01 RX ORDER — BALSAM PERU/CASTOR OIL
OINTMENT (GRAM) TOPICAL 3 TIMES DAILY
Status: DISCONTINUED | OUTPATIENT
Start: 2021-01-01 | End: 2021-01-01 | Stop reason: HOSPADM

## 2021-01-01 RX ORDER — ALBUTEROL SULFATE 0.83 MG/ML
2.5 SOLUTION RESPIRATORY (INHALATION)
Status: DISCONTINUED | OUTPATIENT
Start: 2021-01-01 | End: 2021-01-01 | Stop reason: HOSPADM

## 2021-01-01 RX ORDER — LORAZEPAM 0.5 MG/1
0.5 TABLET ORAL
Status: DISCONTINUED | OUTPATIENT
Start: 2021-01-01 | End: 2021-01-01 | Stop reason: HOSPADM

## 2021-01-01 RX ORDER — LEVOFLOXACIN 5 MG/ML
750 INJECTION, SOLUTION INTRAVENOUS EVERY 24 HOURS
Status: DISCONTINUED | OUTPATIENT
Start: 2021-01-01 | End: 2021-01-01 | Stop reason: HOSPADM

## 2021-01-01 RX ORDER — LORAZEPAM 2 MG/ML
0.5 INJECTION INTRAMUSCULAR
Status: DISCONTINUED | OUTPATIENT
Start: 2021-01-01 | End: 2021-01-01

## 2021-01-01 RX ORDER — DIGOXIN 125 MCG
0.25 TABLET ORAL DAILY
Status: DISCONTINUED | OUTPATIENT
Start: 2021-01-01 | End: 2021-01-01

## 2021-01-01 RX ORDER — NIFEDIPINE 30 MG/1
30 TABLET, EXTENDED RELEASE ORAL DAILY
Status: DISCONTINUED | OUTPATIENT
Start: 2021-01-01 | End: 2021-01-01 | Stop reason: HOSPADM

## 2021-01-01 RX ORDER — PROCHLORPERAZINE EDISYLATE 5 MG/ML
10 INJECTION INTRAMUSCULAR; INTRAVENOUS
Status: DISCONTINUED | OUTPATIENT
Start: 2021-01-01 | End: 2021-01-01 | Stop reason: SDUPTHER

## 2021-01-01 RX ORDER — POLYETHYLENE GLYCOL 3350 17 G/17G
17 POWDER, FOR SOLUTION ORAL DAILY PRN
Status: DISCONTINUED | OUTPATIENT
Start: 2021-01-01 | End: 2021-01-01 | Stop reason: HOSPADM

## 2021-01-01 RX ORDER — NIFEDIPINE 30 MG/1
30 TABLET, EXTENDED RELEASE ORAL DAILY
Status: DISCONTINUED | OUTPATIENT
Start: 2021-01-01 | End: 2021-01-01

## 2021-01-01 RX ORDER — LORAZEPAM 2 MG/ML
1 INJECTION INTRAMUSCULAR EVERY 4 HOURS
Status: DISCONTINUED | OUTPATIENT
Start: 2021-01-01 | End: 2021-01-01 | Stop reason: HOSPADM

## 2021-01-01 RX ORDER — FACIAL-BODY WIPES
10 EACH TOPICAL DAILY PRN
Status: DISCONTINUED | OUTPATIENT
Start: 2021-01-01 | End: 2021-01-01 | Stop reason: HOSPADM

## 2021-01-01 RX ORDER — ENOXAPARIN SODIUM 100 MG/ML
40 INJECTION SUBCUTANEOUS DAILY
Status: DISCONTINUED | OUTPATIENT
Start: 2021-01-01 | End: 2021-01-01 | Stop reason: HOSPADM

## 2021-01-01 RX ORDER — ACETAMINOPHEN 325 MG/1
650 TABLET ORAL
Status: DISCONTINUED | OUTPATIENT
Start: 2021-01-01 | End: 2021-01-01 | Stop reason: HOSPADM

## 2021-01-01 RX ORDER — POLYETHYLENE GLYCOL 3350 17 G/17G
17 POWDER, FOR SOLUTION ORAL
Status: DISPENSED | OUTPATIENT
Start: 2021-01-01 | End: 2021-01-01

## 2021-01-01 RX ORDER — SODIUM CHLORIDE 0.9 % (FLUSH) 0.9 %
5-40 SYRINGE (ML) INJECTION AS NEEDED
Status: DISCONTINUED | OUTPATIENT
Start: 2021-01-01 | End: 2021-01-01 | Stop reason: HOSPADM

## 2021-01-01 RX ORDER — MORPHINE SULFATE 2 MG/ML
2 INJECTION, SOLUTION INTRAMUSCULAR; INTRAVENOUS
Status: DISCONTINUED | OUTPATIENT
Start: 2021-01-01 | End: 2021-01-01 | Stop reason: HOSPADM

## 2021-01-01 RX ORDER — LORAZEPAM 2 MG/ML
0.5 INJECTION INTRAMUSCULAR EVERY 4 HOURS
Status: DISCONTINUED | OUTPATIENT
Start: 2021-01-01 | End: 2021-01-01

## 2021-01-01 RX ORDER — BACITRACIN 500 UNIT/G
1 PACKET (EA) TOPICAL 3 TIMES DAILY
Status: DISCONTINUED | OUTPATIENT
Start: 2021-01-01 | End: 2021-01-01 | Stop reason: HOSPADM

## 2021-01-01 RX ORDER — DIGOXIN 250 MCG
TABLET ORAL
Qty: 90 TAB | Refills: 1 | Status: SHIPPED | OUTPATIENT
Start: 2021-01-01

## 2021-01-01 RX ORDER — SODIUM CHLORIDE 0.9 % (FLUSH) 0.9 %
10 SYRINGE (ML) INJECTION
Status: COMPLETED | OUTPATIENT
Start: 2021-01-01 | End: 2021-01-01

## 2021-01-01 RX ORDER — LEVOFLOXACIN 5 MG/ML
750 INJECTION, SOLUTION INTRAVENOUS
Status: DISCONTINUED | OUTPATIENT
Start: 2021-01-01 | End: 2021-01-01

## 2021-01-01 RX ORDER — ONDANSETRON 4 MG/1
4 TABLET, ORALLY DISINTEGRATING ORAL
Status: DISCONTINUED | OUTPATIENT
Start: 2021-01-01 | End: 2021-01-01 | Stop reason: HOSPADM

## 2021-01-01 RX ADMIN — FUROSEMIDE 20 MG: 10 INJECTION, SOLUTION INTRAMUSCULAR; INTRAVENOUS at 10:47

## 2021-01-01 RX ADMIN — FUROSEMIDE 20 MG: 20 TABLET ORAL at 19:13

## 2021-01-01 RX ADMIN — LORAZEPAM 0.5 MG: 2 INJECTION INTRAMUSCULAR; INTRAVENOUS at 09:21

## 2021-01-01 RX ADMIN — Medication: at 10:02

## 2021-01-01 RX ADMIN — ENOXAPARIN SODIUM 40 MG: 40 INJECTION SUBCUTANEOUS at 10:01

## 2021-01-01 RX ADMIN — NIFEDIPINE 30 MG: 30 TABLET, FILM COATED, EXTENDED RELEASE ORAL at 09:14

## 2021-01-01 RX ADMIN — PIPERACILLIN AND TAZOBACTAM 3.38 G: 3; .375 INJECTION, POWDER, LYOPHILIZED, FOR SOLUTION INTRAVENOUS at 02:01

## 2021-01-01 RX ADMIN — FUROSEMIDE 20 MG: 10 INJECTION, SOLUTION INTRAMUSCULAR; INTRAVENOUS at 10:02

## 2021-01-01 RX ADMIN — LORAZEPAM 1 MG: 2 INJECTION INTRAMUSCULAR; INTRAVENOUS at 16:21

## 2021-01-01 RX ADMIN — MORPHINE SULFATE 2 MG: 2 INJECTION, SOLUTION INTRAMUSCULAR; INTRAVENOUS at 16:07

## 2021-01-01 RX ADMIN — FUROSEMIDE 20 MG: 10 INJECTION, SOLUTION INTRAMUSCULAR; INTRAVENOUS at 09:26

## 2021-01-01 RX ADMIN — NIFEDIPINE 30 MG: 30 TABLET, FILM COATED, EXTENDED RELEASE ORAL at 09:05

## 2021-01-01 RX ADMIN — PIPERACILLIN AND TAZOBACTAM 3.38 G: 3; .375 INJECTION, POWDER, LYOPHILIZED, FOR SOLUTION INTRAVENOUS at 17:54

## 2021-01-01 RX ADMIN — ASPIRIN 81 MG: 81 TABLET, COATED ORAL at 09:26

## 2021-01-01 RX ADMIN — Medication: at 17:53

## 2021-01-01 RX ADMIN — LORAZEPAM 1 MG: 2 INJECTION INTRAMUSCULAR; INTRAVENOUS at 15:52

## 2021-01-01 RX ADMIN — PIPERACILLIN AND TAZOBACTAM 3.38 G: 3; .375 INJECTION, POWDER, LYOPHILIZED, FOR SOLUTION INTRAVENOUS at 10:47

## 2021-01-01 RX ADMIN — PIPERACILLIN AND TAZOBACTAM 3.38 G: 3; .375 INJECTION, POWDER, LYOPHILIZED, FOR SOLUTION INTRAVENOUS at 18:53

## 2021-01-01 RX ADMIN — ENOXAPARIN SODIUM 40 MG: 40 INJECTION SUBCUTANEOUS at 09:13

## 2021-01-01 RX ADMIN — DIGOXIN 0.25 MG: 250 TABLET ORAL at 10:01

## 2021-01-01 RX ADMIN — MORPHINE SULFATE 2 MG: 2 INJECTION, SOLUTION INTRAMUSCULAR; INTRAVENOUS at 12:42

## 2021-01-01 RX ADMIN — LORAZEPAM 0.5 MG: 2 INJECTION INTRAMUSCULAR; INTRAVENOUS at 04:48

## 2021-01-01 RX ADMIN — MORPHINE SULFATE 2 MG: 2 INJECTION, SOLUTION INTRAMUSCULAR; INTRAVENOUS at 09:22

## 2021-01-01 RX ADMIN — Medication 10 ML: at 06:00

## 2021-01-01 RX ADMIN — ACETAMINOPHEN 650 MG: 325 TABLET ORAL at 16:28

## 2021-01-01 RX ADMIN — Medication 10 ML: at 06:01

## 2021-01-01 RX ADMIN — BACITRACIN 1 PACKET: 500 OINTMENT TOPICAL at 17:51

## 2021-01-01 RX ADMIN — IOPAMIDOL 80 ML: 755 INJECTION, SOLUTION INTRAVENOUS at 17:28

## 2021-01-01 RX ADMIN — Medication 10 ML: at 22:00

## 2021-01-01 RX ADMIN — LEVOFLOXACIN 750 MG: 5 INJECTION, SOLUTION INTRAVENOUS at 16:15

## 2021-01-01 RX ADMIN — FUROSEMIDE 20 MG: 10 INJECTION, SOLUTION INTRAMUSCULAR; INTRAVENOUS at 18:13

## 2021-01-01 RX ADMIN — DIGOXIN 0.25 MG: 250 TABLET ORAL at 09:26

## 2021-01-01 RX ADMIN — PIPERACILLIN AND TAZOBACTAM 3.38 G: 3; .375 INJECTION, POWDER, LYOPHILIZED, FOR SOLUTION INTRAVENOUS at 04:19

## 2021-01-01 RX ADMIN — LORAZEPAM 0.5 MG: 2 INJECTION INTRAMUSCULAR; INTRAVENOUS at 20:59

## 2021-01-01 RX ADMIN — BACITRACIN 1 PACKET: 500 OINTMENT TOPICAL at 22:20

## 2021-01-01 RX ADMIN — LORAZEPAM 1 MG: 2 INJECTION INTRAMUSCULAR; INTRAVENOUS at 07:27

## 2021-01-01 RX ADMIN — LORAZEPAM 1 MG: 2 INJECTION INTRAMUSCULAR; INTRAVENOUS at 11:56

## 2021-01-01 RX ADMIN — Medication 10 ML: at 14:00

## 2021-01-01 RX ADMIN — LORAZEPAM 1 MG: 2 INJECTION INTRAMUSCULAR; INTRAVENOUS at 19:13

## 2021-01-01 RX ADMIN — ASPIRIN 81 MG: 81 TABLET, COATED ORAL at 10:01

## 2021-01-01 RX ADMIN — ONDANSETRON 4 MG: 2 INJECTION INTRAMUSCULAR; INTRAVENOUS at 09:26

## 2021-01-01 RX ADMIN — DIGOXIN 0.25 MG: 250 TABLET ORAL at 09:04

## 2021-01-01 RX ADMIN — Medication 10 ML: at 15:13

## 2021-01-01 RX ADMIN — BACITRACIN 1 PACKET: 500 OINTMENT TOPICAL at 09:24

## 2021-01-01 RX ADMIN — Medication 10 ML: at 16:00

## 2021-01-01 RX ADMIN — Medication 10 ML: at 22:23

## 2021-01-01 RX ADMIN — LORAZEPAM 0.5 MG: 0.5 TABLET ORAL at 22:20

## 2021-01-01 RX ADMIN — LEVOFLOXACIN 750 MG: 5 INJECTION, SOLUTION INTRAVENOUS at 15:45

## 2021-01-01 RX ADMIN — Medication: at 23:43

## 2021-01-01 RX ADMIN — PIPERACILLIN AND TAZOBACTAM 3.38 G: 3; .375 INJECTION, POWDER, LYOPHILIZED, FOR SOLUTION INTRAVENOUS at 03:09

## 2021-01-01 RX ADMIN — MORPHINE SULFATE 2 MG: 2 INJECTION, SOLUTION INTRAMUSCULAR; INTRAVENOUS at 12:05

## 2021-01-01 RX ADMIN — PIPERACILLIN AND TAZOBACTAM 3.38 G: 3; .375 INJECTION, POWDER, LYOPHILIZED, FOR SOLUTION INTRAVENOUS at 18:13

## 2021-01-01 RX ADMIN — LORAZEPAM 0.5 MG: 0.5 TABLET ORAL at 15:57

## 2021-01-01 RX ADMIN — LEVOFLOXACIN 750 MG: 5 INJECTION, SOLUTION INTRAVENOUS at 16:00

## 2021-01-01 RX ADMIN — MORPHINE SULFATE 2 MG: 2 INJECTION, SOLUTION INTRAMUSCULAR; INTRAVENOUS at 23:44

## 2021-01-01 RX ADMIN — NIFEDIPINE 30 MG: 30 TABLET, FILM COATED, EXTENDED RELEASE ORAL at 10:02

## 2021-01-01 RX ADMIN — MORPHINE SULFATE 3 MG: 2 INJECTION, SOLUTION INTRAMUSCULAR; INTRAVENOUS at 16:21

## 2021-01-01 RX ADMIN — LORAZEPAM 1 MG: 2 INJECTION INTRAMUSCULAR; INTRAVENOUS at 03:11

## 2021-01-01 RX ADMIN — MORPHINE SULFATE 2 MG: 2 INJECTION, SOLUTION INTRAMUSCULAR; INTRAVENOUS at 11:57

## 2021-01-01 RX ADMIN — LORAZEPAM 0.5 MG: 2 INJECTION INTRAMUSCULAR; INTRAVENOUS at 00:25

## 2021-01-01 RX ADMIN — ASPIRIN 81 MG: 81 TABLET, COATED ORAL at 09:05

## 2021-01-01 RX ADMIN — Medication 10 ML: at 15:52

## 2021-01-01 RX ADMIN — NIFEDIPINE 30 MG: 30 TABLET, FILM COATED, EXTENDED RELEASE ORAL at 09:24

## 2021-01-01 RX ADMIN — Medication: at 09:39

## 2021-01-01 RX ADMIN — MORPHINE SULFATE 2 MG: 2 INJECTION, SOLUTION INTRAMUSCULAR; INTRAVENOUS at 17:37

## 2021-01-01 RX ADMIN — FUROSEMIDE 20 MG: 20 TABLET ORAL at 09:24

## 2021-01-01 RX ADMIN — BACITRACIN 1 PACKET: 500 OINTMENT TOPICAL at 15:51

## 2021-01-01 RX ADMIN — ENOXAPARIN SODIUM 40 MG: 40 INJECTION SUBCUTANEOUS at 09:26

## 2021-01-01 RX ADMIN — ASPIRIN 81 MG: 81 TABLET, COATED ORAL at 09:13

## 2021-01-01 RX ADMIN — Medication: at 20:59

## 2021-01-01 RX ADMIN — FUROSEMIDE 20 MG: 10 INJECTION, SOLUTION INTRAMUSCULAR; INTRAVENOUS at 18:53

## 2021-01-01 RX ADMIN — FUROSEMIDE 20 MG: 10 INJECTION, SOLUTION INTRAMUSCULAR; INTRAVENOUS at 09:19

## 2021-01-01 RX ADMIN — DIGOXIN 0.25 MG: 250 TABLET ORAL at 09:14

## 2021-01-01 RX ADMIN — Medication 5 ML: at 16:22

## 2021-01-01 RX ADMIN — LORAZEPAM 0.5 MG: 2 INJECTION INTRAMUSCULAR; INTRAVENOUS at 12:05

## 2021-01-01 RX ADMIN — FUROSEMIDE 20 MG: 10 INJECTION, SOLUTION INTRAMUSCULAR; INTRAVENOUS at 17:52

## 2021-01-01 RX ADMIN — MORPHINE SULFATE 2 MG: 2 INJECTION, SOLUTION INTRAMUSCULAR; INTRAVENOUS at 03:11

## 2021-01-01 RX ADMIN — METHYLPREDNISOLONE SODIUM SUCCINATE 80 MG: 125 INJECTION, POWDER, FOR SOLUTION INTRAMUSCULAR; INTRAVENOUS at 16:07

## 2021-01-01 RX ADMIN — BACITRACIN 1 PACKET: 500 OINTMENT TOPICAL at 09:30

## 2021-01-01 RX ADMIN — ONDANSETRON 4 MG: 4 TABLET, ORALLY DISINTEGRATING ORAL at 14:00

## 2021-01-01 RX ADMIN — MORPHINE SULFATE 2 MG: 2 INJECTION, SOLUTION INTRAMUSCULAR; INTRAVENOUS at 21:01

## 2021-01-01 RX ADMIN — Medication 5 ML: at 11:57

## 2021-01-01 RX ADMIN — MORPHINE SULFATE 2 MG: 2 INJECTION, SOLUTION INTRAMUSCULAR; INTRAVENOUS at 00:00

## 2021-01-01 RX ADMIN — MORPHINE SULFATE 2 MG: 2 INJECTION, SOLUTION INTRAMUSCULAR; INTRAVENOUS at 07:27

## 2021-01-01 RX ADMIN — PIPERACILLIN AND TAZOBACTAM 3.38 G: 3; .375 INJECTION, POWDER, LYOPHILIZED, FOR SOLUTION INTRAVENOUS at 09:25

## 2021-01-01 RX ADMIN — MORPHINE SULFATE 2 MG: 2 INJECTION, SOLUTION INTRAMUSCULAR; INTRAVENOUS at 19:14

## 2021-01-01 RX ADMIN — Medication 10 ML: at 22:22

## 2021-01-01 RX ADMIN — ENOXAPARIN SODIUM 40 MG: 40 INJECTION SUBCUTANEOUS at 09:05

## 2021-01-01 RX ADMIN — ONDANSETRON 4 MG: 2 INJECTION INTRAMUSCULAR; INTRAVENOUS at 12:17

## 2021-01-01 RX ADMIN — Medication: at 22:21

## 2021-01-01 RX ADMIN — PIPERACILLIN AND TAZOBACTAM 3.38 G: 3; .375 INJECTION, POWDER, LYOPHILIZED, FOR SOLUTION INTRAVENOUS at 03:02

## 2021-01-01 RX ADMIN — NIFEDIPINE 30 MG: 30 TABLET, FILM COATED, EXTENDED RELEASE ORAL at 09:26

## 2021-01-01 RX ADMIN — DIGOXIN 0.25 MG: 250 TABLET ORAL at 09:24

## 2021-01-01 RX ADMIN — MORPHINE SULFATE 2 MG: 2 INJECTION, SOLUTION INTRAMUSCULAR; INTRAVENOUS at 04:49

## 2021-01-01 RX ADMIN — LEVOFLOXACIN 750 MG: 5 INJECTION, SOLUTION INTRAVENOUS at 16:08

## 2021-01-01 RX ADMIN — PIPERACILLIN AND TAZOBACTAM 3.38 G: 3; .375 INJECTION, POWDER, LYOPHILIZED, FOR SOLUTION INTRAVENOUS at 09:17

## 2021-01-01 RX ADMIN — BACITRACIN 1 PACKET: 500 OINTMENT TOPICAL at 10:02

## 2021-01-01 RX ADMIN — Medication: at 16:22

## 2021-01-01 RX ADMIN — PIPERACILLIN AND TAZOBACTAM 3.38 G: 3; .375 INJECTION, POWDER, LYOPHILIZED, FOR SOLUTION INTRAVENOUS at 17:32

## 2021-01-01 RX ADMIN — PIPERACILLIN AND TAZOBACTAM 3.38 G: 3; .375 INJECTION, POWDER, LYOPHILIZED, FOR SOLUTION INTRAVENOUS at 09:59

## 2021-01-01 RX ADMIN — FUROSEMIDE 20 MG: 20 TABLET ORAL at 17:37

## 2021-01-01 RX ADMIN — Medication 10 ML: at 22:43

## 2021-01-01 RX ADMIN — Medication 10 ML: at 16:07

## 2021-01-01 RX ADMIN — LORAZEPAM 0.5 MG: 2 INJECTION INTRAMUSCULAR; INTRAVENOUS at 17:36

## 2021-01-01 RX ADMIN — LORAZEPAM 1 MG: 2 INJECTION INTRAMUSCULAR; INTRAVENOUS at 23:43

## 2021-05-04 NOTE — TELEPHONE ENCOUNTER
Cardiologist: Dr. Walter Cowden    Last appt: 7/28/2020  Future Appointments   Date Time Provider Itzel Winchester   5/4/2021 10:00 AM ERIN THOMAS   5/4/2021 11:00 AM MD ANTONETTE Lemons       Requested Prescriptions     Signed Prescriptions Disp Refills    digoxin (LANOXIN) 0.25 mg tablet 90 Tab 1     Sig: TAKE 1 TABLET BY MOUTH EVERY DAY     Authorizing Provider: Desiree Lindo     Ordering User: ANTHONY MOYA         Refills VO per Dr. Walter Cowden.

## 2021-05-04 NOTE — PROGRESS NOTES
Visit Vitals  /60 (BP 1 Location: Right arm, BP Patient Position: Sitting, BP Cuff Size: Adult)   Pulse 78   Resp 12   Ht 5' 9\" (1.753 m)   Wt 152 lb (68.9 kg)   SpO2 95%   BMI 22.45 kg/m²

## 2021-05-04 NOTE — PROGRESS NOTES
[address] [facility]                                                            OFFICE NOTE        Abi Pagan M.D.,RADHA Jhon ELLISER III   6/5/1932  [id number]    Date/Time:  5/4/202111:33 AM        ICD-10-CM ICD-9-CM    1. Atrial fibrillation, unspecified type (HCC)  I48.91 427.31 AMB POC EKG ROUTINE W/ 12 LEADS, INTER & REP         SUBJECTIVE:    C/o sob   no cp or syncope or palpitations     Assessment/Plan    1. Atrial fibrillation: This is chronic. The rate is well controlled on digoxin alone. He is electrocardio today reveals atrial fibrillation rate of 74 with nonspecific ST-T wave abnormalities. Transthoracic echocardiogram done today reveals a normal ejection fraction and mild to moderate pulmonary hypertension. He is off Eliquis on account of significant hematuria and for now continues on aspirin alone. 2.  Lung cancer: Undergoing close follow-up with hematology oncology. I have noticed a moderate pleural effusion on the left on echocardiogram today. He will follow-up with his oncologist.  He has undergone thoracentesis before I suspect he may require thoracentesis soon as well. 3.  Hyperlipidemia: Closely followed by his primary care physician. 4.  Hypertension: Well controlled. 5.  Abdominal aortic aneurysm: Closely followed by his primary care physician and vascular surgeon as well. No additional cardiac interventions for now. We will see him back in 4 months or sooner if any question of problems should arise prior to that            HPI      80 y. o. male. with a past medical history remarkable for prostate cancer diagnosed approximately in [address] [place] [address], for which he has undergone prostatectomy, radiation therapy at the level of the pelvis. He also has a history of left breast cancer, for which he has undergone left mastectomy in 2004, chest radiation and chemotherapy.  He denies any previous history of hypertension, hyperlipidemia, diabetes or coronary artery disease. He was seen in the hospital at [facility] in May 2008 for dizziness. He was found to have atrial fibrillation. On account of that, he underwent JOHN followed by DC cardioversion. To be noted that the ejection fraction was estimated at 60% at that time. He only had trace to mild mitral regurgitation and tricuspid regurgitation. He converted back to normal sinus rhythm promptly after cardioversion. He has had recurrence of atrial fibrillation and has persistent atrial fibrillation. Echo on 7/62: Systolic function was normal. Ejection fraction was  estimated in the range of 60 % to 65 %. There were no regional wall motion  abnormalities. Left atrium: The atrium was mildly dilated. Mitral valve: There was mild annular calcification. Tricuspid valve: There was mild regurgitation. ECHO on 5/18:Left ventricle: Systolic function was normal. Ejection fraction was  estimated to be 63 % by Huerta's biplane technique. There were no  regional wall motion abnormalities. There was mild concentric hypertrophy. Left atrium: The atrium was moderately dilated. Right atrium: The atrium was moderately dilated. Mitral valve: There was mild annular calcification. Tricuspid valve: There was mild to nearing moderate regurgitation. Pulmonary artery systolic pressure: 35 mmHg. Pulmonic valve: There was mild regurgitation.          04/29/19   ECHO ADULT COMPLETE 04/30/2019 4/30/2019     Narrative · Calculated left ventricular ejection fraction is 61%. Biplane method   used to measure ejection fraction. No regional wall motion abnormality   noted. · There is a moderate left pleural effusion. · Left atrial cavity size is mildly dilated. · Moderate aortic valve sclerosis with no significant stenosis. · Mild mitral annular calcification. Mild mitral valve regurgitation.   · Tricuspid Valve: Mild tricuspid valve regurgitation is present. · Pulmonary Artery: There is no evidence of pulmonary hypertension.          Signed by: Dominique Castrejon MD                           CARDIAC STUDIES                04/29/19   ECHO ADULT COMPLETE 04/30/2019 4/30/2019     Narrative · Calculated left ventricular ejection fraction is 61%. Biplane method   used to measure ejection fraction. No regional wall motion abnormality   noted. · There is a moderate left pleural effusion. · Left atrial cavity size is mildly dilated. · Moderate aortic valve sclerosis with no significant stenosis. · Mild mitral annular calcification. Mild mitral valve regurgitation. · Tricuspid Valve: Mild tricuspid valve regurgitation is present. · Pulmonary Artery: There is no evidence of pulmonary hypertension.          Signed by: Dominique Castrejon MD                 CARDIAC STUDIES        04/29/19   ECHO ADULT COMPLETE 04/30/2019 4/30/2019    Narrative · Calculated left ventricular ejection fraction is 61%. Biplane method   used to measure ejection fraction. No regional wall motion abnormality   noted. · There is a moderate left pleural effusion. · Left atrial cavity size is mildly dilated. · Moderate aortic valve sclerosis with no significant stenosis. · Mild mitral annular calcification. Mild mitral valve regurgitation. · Tricuspid Valve: Mild tricuspid valve regurgitation is present. · Pulmonary Artery: There is no evidence of pulmonary hypertension. Signed by: Dominique Castrejon MD                                 EKG Results     Procedure 720 Value Units Date/Time    AMB POC EKG ROUTINE W/ 12 LEADS, INTER & REP [[id number]] Resulted: 05/04/21 1113    Order Status: Completed Updated: 05/04/21 1117              IMAGING      MRI Results (most recent):  No results found for this or any previous visit. CT Results (most recent):  No results found for this or any previous visit.     XR Results (most recent):  Results from [facility] Encounter encounter on 09/04/20   XR CHEST PORT    Narrative EXAM: XR CHEST PORT    INDICATION: post left thoracentesis    COMPARISON: Chest x-ray 8/24/2020    FINDINGS: A portable AP radiograph of the chest was obtained at 15:28 hours. The  patient is on a cardiac monitor. There is a small residual left pleural effusion  with no pneumothorax and clear right lung. The cardiac and mediastinal contours  and pulmonary vascularity are normal.  The bones and soft tissues are grossly  within normal limits. Impression IMPRESSION: Small residual pleural effusion status post thoracentesis with no  pneumothorax. Past Medical History:   Diagnosis Date    [facility] ([facility]     Bladder cancer ([facility]     [facility] 07/26/2011    Breast cancer, male ([facility] 07/05/2012    Colon polyps 7/26/2011    Glucose intolerance (impaired glucose tolerance) 10/31/2013    Prostate cancer ([facility] 07/26/2011    Typhoid fever 7/26/2011     Past Surgical History:   Procedure Laterality Date    HX CATARACT REMOVAL      HX MASTECTOMY      HX POLYPECTOMY      HX PROSTATECTOMY      HX TONSILLECTOMY      IR THORACENTESIS CATH W IMAGE  11/8/2019     Social History     Tobacco Use    Smoking status: Former Smoker     Packs/day: 2.00    Smokeless tobacco: Never Used   Substance Use Topics    Alcohol use: Yes     Alcohol/week: 5.8 standard drinks     Types: 7 Standard drinks or equivalent per week     Comment: A drink at night    Drug use: No     No family history on file. No Known Allergies      Visit Vitals  /60 (BP 1 Location: Right arm, BP Patient Position: Sitting, BP Cuff Size: Adult)   Pulse 78   Resp 12   Ht 5' 9\" (1.753 m)   Wt 152 lb (68.9 kg)   SpO2 95%   BMI 22.45 kg/m²         Last 3 Recorded Weights in this Encounter    05/04/21 1107   Weight: 152 lb (68.9 kg)            Review of Systems:   Pertinent items are noted in the History of Present Illness.        Neck: no JVD  Heart: irregularly irregular rhythm  Lungs: diminished breath sounds R base, L base  Abdomen: soft, non-tender. Bowel sounds normal. No masses,  no organomegaly  Extremities: no edema      Current Outpatient Medications on File Prior to Visit   Medication Sig Dispense Refill    digoxin (LANOXIN) 0.25 mg tablet TAKE 1 TABLET BY MOUTH EVERY DAY 90 Tab 1    NIFEdipine ER (ADALAT CC) 30 mg ER tablet TAKE 1 TABLET BY MOUTH DAILY 90 Tab 1    abemaciclib (Verzenio) 100 mg tab Take  by mouth.  ondansetron hcl (ZOFRAN) 8 mg tablet Take 8 mg by mouth every eight (8) hours as needed for Nausea or Vomiting.  diphenoxylate-atropine (LOMOTIL) 2.5-0.025 mg per tablet Take  by mouth four (4) times daily as needed for Diarrhea.  aspirin delayed-release 81 mg tablet Take  by mouth daily.  CYANOCOBALAMIN, VITAMIN B-12, (VITAMIN B-12 PO) Take 3,000 mcg by mouth daily.  cholecalciferol (VITAMIN D3) 1,000 unit tablet Take 1,000 Units by mouth daily.  MULTIVITAMIN (MULTIPLE VITAMINS PO) Take  by mouth daily.  OTHER Monthly hormone injection. No current facility-administered medications on file prior to visit. Edith Bullard. Radhadra Capellan III had no medications administered during this visit. Current Outpatient Medications   Medication Sig    digoxin (LANOXIN) 0.25 mg tablet TAKE 1 TABLET BY MOUTH EVERY DAY    NIFEdipine ER (ADALAT CC) 30 mg ER tablet TAKE 1 TABLET BY MOUTH DAILY    abemaciclib (Verzenio) 100 mg tab Take  by mouth.  ondansetron hcl (ZOFRAN) 8 mg tablet Take 8 mg by mouth every eight (8) hours as needed for Nausea or Vomiting.  diphenoxylate-atropine (LOMOTIL) 2.5-0.025 mg per tablet Take  by mouth four (4) times daily as needed for Diarrhea.  aspirin delayed-release 81 mg tablet Take  by mouth daily.  CYANOCOBALAMIN, VITAMIN B-12, (VITAMIN B-12 PO) Take 3,000 mcg by mouth daily.  cholecalciferol (VITAMIN D3) 1,000 unit tablet Take 1,000 Units by mouth daily.     MULTIVITAMIN (MULTIPLE VITAMINS PO) Take by mouth daily.  OTHER Monthly hormone injection. No current facility-administered medications for this visit. Lab Results   Component Value Date/Time    Cholesterol, total 147 09/14/2020 12:46 PM    Cholesterol (POC) 148 10/11/2019 02:03 PM    HDL Cholesterol 58 09/14/2020 12:46 PM    HDL Cholesterol (POC) 79 10/11/2019 02:03 PM    LDL Cholesterol (POC) 52 10/11/2019 02:03 PM    LDL, calculated 69 09/14/2020 12:46 PM    VLDL, calculated 20 09/14/2020 12:46 PM    Triglyceride 109 09/14/2020 12:46 PM    Triglycerides (POC) 90 10/11/2019 02:03 PM       Lab Results   Component Value Date/Time    Sodium 140 09/14/2020 12:46 PM    Potassium 4.3 09/14/2020 12:46 PM    Chloride 98 09/14/2020 12:46 PM    CO2 28 09/14/2020 12:46 PM    Anion gap 8 08/19/2017 06:03 AM    Glucose 114 (H) 09/14/2020 12:46 PM    BUN 14 09/14/2020 12:46 PM    Creatinine 1.11 09/14/2020 12:46 PM    BUN/Creatinine ratio 13 09/14/2020 12:46 PM    GFR est AA 68 09/14/2020 12:46 PM    GFR est non-AA 59 (L) 09/14/2020 12:46 PM    Calcium 8.9 09/14/2020 12:46 PM       Lab Results   Component Value Date/Time    ALT (SGPT) 12 09/14/2020 12:46 PM    Alk.  phosphatase 76 09/14/2020 12:46 PM    Bilirubin, total 0.3 09/14/2020 12:46 PM       Lab Results   Component Value Date/Time    WBC 2.2 (LL) 09/14/2020 12:46 PM    HGB (POC) 15.3 10/11/2019 02:03 PM    HGB 12.2 (L) 09/14/2020 12:46 PM    HCT (POC) 46.4 10/11/2019 02:03 PM    HCT 35.0 (L) 09/14/2020 12:46 PM    PLATELET 009 (L) 33/33/2138 12:46 PM     (H) 09/14/2020 12:46 PM       Lab Results   Component Value Date/Time    TSH 1.820 09/14/2020 12:46 PM         Lab Results   Component Value Date/Time    Cholesterol, total 147 09/14/2020 12:46 PM    Cholesterol (POC) 148 10/11/2019 02:03 PM    HDL Cholesterol 58 09/14/2020 12:46 PM    LDL, calculated 69 09/14/2020 12:46 PM    Triglyceride 109 09/14/2020 12:46 PM    Triglycerides (POC) 90 10/11/2019 02:03 PM                Please note that this dictation was completed with [employer]ee, the computer voice recognition software. Quite often unanticipated grammatical, syntax, homophones, and other interpretative errors are inadvertently transcribed by the computer software. Please disregard these errors. Please excuse any errors that have escaped final proofreading.

## 2021-05-05 NOTE — TELEPHONE ENCOUNTER
PA for Digoxin initiated and approved.     PA Case: [id number], Status: Approved, Coverage Starts on: 2/3/2021 12:00:00 AM, Coverage Ends on: 5/5/2022 12:00:00 AM

## 2021-05-17 NOTE — PROGRESS NOTES
Discharge instructions reviewed with patient with good understanding. Patient signed discharge instructions and copy given to patient upon leaving. Band aid to Left lower posterior thoracentesis site remains dry and intact. Patient taken to car via wheelchair with nurse at side. Patient to have repeat single view chest xray tomorrow @ [facility] with his Pet Scan (per Dr. Moore People).

## 2021-05-17 NOTE — ROUTINE PROCESS
KRUNAL Ramirez at bedside evaluating patient and explaining procedure to patient with risks and benefits. Patient verbalized understanding and signed consent for procedure.

## 2021-05-17 NOTE — DISCHARGE INSTRUCTIONS
Patient Education   [facility]  Radiology Department  [phone]    Radiologist:  Lucas Franklin    Date:  5/17/2021      Thoracentesis Discharge Instructions    You may have an aching pain in the puncture site tonight as the numbing medicine wears off. You may take Tylenol, as directed on the label, for pain or discomfort. Avoid ibuprofen (Advil, Motrin) and aspirin products for the next 48 hours as these drugs may increase your chance of bleeding. You have develop a mild cough as the lungs re expand with air, this should resolve with in the next 24 hours. Resume your previous diet and continue your prescribed medicaitons. Rest for the next 24 hours. If you experience shortness of breath (other than your normal breathing pattern) difficulty breathing, or have severe chest pain, call 911 and go to the nearest Emergency Room. Be sure to follow up with your referring physician as soon as possible. Side effects of sedation medications and other medications used today have been reviewed. Notify us of nausea, itching, hives, dizziness, or anything else out of the ordinary. Should you experience any of these significant changes, please call [phone] between the hours of 7:30 am and 10 pm or [phone] after hours. After hours, ask the  to page the X-ray Technologist, and describe the problem to the technologist.          Thoracentesis: What to Expect at Home  Your Recovery  Thoracentesis (say \"qbud-ad-kmi-JOHN-sis\") is a procedure to remove fluid from the space between the lungs and the chest wall (pleural space). This procedure may also be called a \"chest tap. \" It's normal to have a small amount of fluid in the pleural space. But too much fluid can build up because of problems such as infection, heart failure, or lung cancer. The procedure may have been done to help with shortness of breath and pain caused by the fluid buildup.  Or you may have had this procedure so the doctor could test the fluid to find the cause of the buildup. Your chest may be sore where the doctor put the needle or catheter into your skin (the puncture site). This usually gets better after a day or two. You can go back to work or your normal activities as soon as you feel up to it. If a large amount of pleural fluid was removed during the procedure, you will probably be able to breathe more easily. If more pleural fluid collects and needs to be removed, another thoracentesis may be done later. If the doctor sent the fluid to a lab for testing, it may take several days to get the results. The doctor or nurse will discuss the results with you. This care sheet gives you a general idea about how long it will take for you to recover. But each person recovers at a different pace. Follow the steps below to feel better as quickly as possible. How can you care for yourself at home? Activity    · Rest when you feel tired. Getting enough sleep will help you recover.     · Avoid strenuous activities, such as bicycle riding, jogging, weight lifting, or aerobic exercise, until your doctor says it is okay.     · You may shower. Do not take a bath until the puncture site has healed, or until your doctor tells you it is okay.     · Ask your doctor when you can drive again.     · You may need to take 1 or 2 days off from work. It depends on the type of work you do and how you feel. Diet    · You can eat your normal diet.     · Drink plenty of fluids (unless your doctor tells you not to). Medicines    · Your doctor will tell you if and when you can restart your medicines. He or she will also give you instructions about taking any new medicines.     · If you take aspirin or some other blood thinner, ask your doctor if and when to start taking it again. Make sure that you understand exactly what your doctor wants you to do.     · Be safe with medicines. Take pain medicines exactly as directed. ?  If the doctor gave you a prescription medicine for pain, take it as prescribed. ? If you are not taking a prescription pain medicine, ask your doctor if you can take an over-the-counter medicine. ? Do not take two or more pain medicines at the same time unless the doctor told you to. Many pain medicines have acetaminophen, which is Tylenol. Too much acetaminophen (Tylenol) can be harmful.     · If you think your pain medicine is making you sick to your stomach:  ? Take your medicine after meals (unless your doctor has told you not to). ? Ask your doctor for a different pain medicine.     · If your doctor prescribed antibiotics, take them as directed. Do not stop taking them just because you feel better. You need to take the full course of antibiotics. Care of the puncture site    · Wash the area daily with warm, soapy water, and pat it dry. Don't use hydrogen peroxide or alcohol, which may delay healing. You may cover the area with a gauze bandage if it weeps or rubs against clothing. Change the bandage every day.     · Keep the area clean and dry. Follow-up care is a key part of your treatment and safety. Be sure to make and go to all appointments, and call your doctor if you are having problems. It's also a good idea to know your test results and keep a list of the medicines you take. When should you call for help? Call 911 anytime you think you may need emergency care. For example, call if:    · You passed out (lost consciousness).     · You have severe trouble breathing.     · You have sudden chest pain and shortness of breath, or you cough up blood.    Call your doctor now or seek immediate medical care if:    · You have shortness of breath that is new or getting worse.     · You have new or worse pain in your chest, especially when you take a deep breath.     · You are sick to your stomach or cannot keep fluids down.     · You have a fever over 100°F.     · Bright red blood has soaked through the bandage over your puncture site.     · You have signs of infection, such as:  ? Increased pain, swelling, warmth, or redness. ? Red streaks leading from the puncture site. ? Pus draining from the puncture site. ? Swollen lymph nodes in your neck, armpits, or groin. ? A fever.     · You cough up a lot more mucus than normal, or your mucus changes color. Watch closely for changes in your health, and be sure to contact your doctor if you have any problems. Where can you learn more? Go to http://www.gray.com/  Enter Q755 in the search box to learn more about \"Thoracentesis: What to Expect at Home. \"  Current as of: October 26, 2020               Content Version: 12.8  © 2006-2021 [employer]. Care instructions adapted under license by [employer] (which disclaims liability or warranty for this information). If you have questions about a medical condition or this instruction, always ask your healthcare professional. [place] [address] any warranty or liability for your use of this information.

## 2021-06-25 PROBLEM — J96.00 ACUTE RESPIRATORY FAILURE (HCC): Status: ACTIVE | Noted: 2021-01-01

## 2021-06-25 PROBLEM — J18.9 PNEUMONIA: Status: ACTIVE | Noted: 2021-01-01

## 2021-06-25 NOTE — H&P
HISTORY AND PHYSICAL  Teofilo Sloan MD        PCP: Darian Benson MD    Please note that this dictation was completed with [employer], the computer voice recognition software. Quite often unanticipated grammatical, syntax, homophones, and other interpretive errors are inadvertently transcribed by the computer software. Please disregard these errors. Please excuse any errors that have escaped final proofreading. CHIEF COMPLAINTS    Dyspnea. HISTORY OF PRESENT ILLNESS   This is an 80year-old pleasant gentleman came to the ED via private vehicle from the doctor's office where he presented with acute dyspnea. He states that shortness of breath hit him around noon. He says he has been having dry cough for a while now. He denied fever but has chilliness. No chest pain, palpitation, dizziness. Denied diarrhea, abdominal pain or diarrhea. He denied dysuria, urgency or frequency. Patient is fully vaccinated against Covid  On arrival in the ED he was found to be hypoxic with SPO2 of 78% on room air. Blood work and chest x-ray were performed. Chest x-ray showed diffuse interstitial and patchy airspace opacities, stable left lateral pleural effusion. CBC with normal WBC with neutrophil pleocytosis, lymphopenia. Sodium 135. Lactic acid 1.4. Troponin less than 0.05.  proBNP 3595. Rapid Covid test negative. 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   PMH/PSH:  Past Medical History:   Diagnosis Date    Atrial fibrillation ([address]     Bladder cancer ([address]     Breast cancer ([address] 07/26/2011    Breast cancer, male ([address] 07/05/2012    Colon polyps 7/26/2011    Glucose intolerance (impaired glucose tolerance) 10/31/2013    Prostate cancer ([address] 07/26/2011    Typhoid fever 7/26/2011     Past Surgical History:   Procedure Laterality Date    HX CATARACT REMOVAL  HX MASTECTOMY      HX POLYPECTOMY      HX PROSTATECTOMY      HX TONSILLECTOMY      IR THORACENTESIS CATH W IMAGE  11/8/2019       Home meds:   Prior to Admission medications    Medication Sig Start Date End Date Taking? Authorizing Provider   NIFEdipine ER (ADALAT CC) 30 mg ER tablet TAKE 1 TABLET BY MOUTH DAILY 6/2/21  Yes Naeem Cuevas MD   digoxin (LANOXIN) 0.25 mg tablet TAKE 1 TABLET BY MOUTH EVERY DAY 5/4/21  Yes Tyrese Banerjee MD   ondansetron hcl (ZOFRAN) 8 mg tablet Take 8 mg by mouth every eight (8) hours as needed for Nausea or Vomiting. Yes Provider, Historical   diphenoxylate-atropine (LOMOTIL) 2.5-0.025 mg per tablet Take  by mouth four (4) times daily as needed for Diarrhea. Yes Provider, Historical   aspirin delayed-release 81 mg tablet Take  by mouth daily. Yes Provider, Historical   CYANOCOBALAMIN, VITAMIN B-12, (VITAMIN B-12 PO) Take 3,000 mcg by mouth daily. Yes Provider, Historical   cholecalciferol (VITAMIN D3) 1,000 unit tablet Take 1,000 Units by mouth daily. Yes Provider, Historical   MULTIVITAMIN (MULTIPLE VITAMINS PO) Take  by mouth daily. Yes Provider, Historical       Allergies:  No Known Allergies    FH:  No family history on file. SH:  Social History     Tobacco Use    Smoking status: Former Smoker     Packs/day: 2.00    Smokeless tobacco: Never Used   Substance Use Topics    Alcohol use: Yes     Alcohol/week: 5.8 standard drinks     Types: 7 Standard drinks or equivalent per week     Comment: A drink at night       ROS: A comprehensive review of systems was negative except for that written in the HPI. PHYSICAL EXAM:  Visit Vitals  BP 99/66   Pulse 84   Temp 98.3 °F (36.8 °C)   Resp (!) 37   SpO2 92%       General:          Alert, cooperative, no distress, appears stated age.      HEENT:           Atraumatic, anicteric sclerae, pink conjunctivae                          No oral ulcers, mucosa moist, throat clear, dentition fair  Neck: Supple, symmetrical,  thyroid: non tender  Lungs:             Clear to auscultation bilaterally. No Wheezing or Rhonchi. No rales. Chest wall:      No tenderness  No Accessory muscle use. Heart:              Regular  rhythm,  No  murmur   No edema  Abdomen:        Soft, non-tender. Not distended. Bowel sounds normal  Extremities:     No cyanosis. No clubbing,                            Skin turgor normal, Capillary refill normal, Radial dial pulse 2+  Skin:                Not pale. Not Jaundiced  No rashes   Psych:             Not anxious or agitated. Neurologic:     Alert and oriented to PPT, CNII-XII intact. Motor and sensory exam grossly intact. Labs/Imaging:  Recent Results (from the past 24 hour(s))   CBC WITH AUTOMATED DIFF    Collection Time: 06/25/21  1:16 PM   Result Value Ref Range    WBC 4.9 4.1 - 11.1 K/uL    RBC 2.76 (L) 4.10 - 5.70 M/uL    HGB 11.1 (L) 12.1 - 17.0 g/dL    HCT 33.2 (L) 36.6 - 50.3 %    .3 (H) 80.0 - 99.0 FL    MCH 40.2 (H) 26.0 - 34.0 PG    MCHC 33.4 30.0 - 36.5 g/dL    RDW 13.4 11.5 - 14.5 %    PLATELET 652 341 - 065 K/uL    MPV 10.1 8.9 - 12.9 FL    NRBC 0.0 0  WBC    ABSOLUTE NRBC 0.00 0.00 - 0.01 K/uL    NEUTROPHILS 88 (H) 32 - 75 %    LYMPHOCYTES 2 (L) 12 - 49 %    MONOCYTES 8 5 - 13 %    EOSINOPHILS 0 0 - 7 %    BASOPHILS 1 0 - 1 %    IMMATURE GRANULOCYTES 1 (H) 0.0 - 0.5 %    ABS. NEUTROPHILS 4.2 1.8 - 8.0 K/UL    ABS. LYMPHOCYTES 0.1 (L) 0.8 - 3.5 K/UL    ABS. MONOCYTES 0.4 0.0 - 1.0 K/UL    ABS. EOSINOPHILS 0.0 0.0 - 0.4 K/UL    ABS. BASOPHILS 0.1 0.0 - 0.1 K/UL    ABS. IMM.  GRANS. 0.1 (H) 0.00 - 0.04 K/UL    DF SMEAR SCANNED      RBC COMMENTS MACROCYTOSIS  2+        RBC COMMENTS OVALOCYTES  PRESENT       METABOLIC PANEL, COMPREHENSIVE    Collection Time: 06/25/21  1:16 PM   Result Value Ref Range    Sodium 135 (L) 136 - 145 mmol/L    Potassium 4.1 3.5 - 5.1 mmol/L    Chloride 101 97 - 108 mmol/L    CO2 29 21 - 32 mmol/L    Anion gap 5 5 - 15 mmol/L Glucose 182 (H) 65 - 100 mg/dL    BUN 20 6 - 20 MG/DL    Creatinine 0.99 0.70 - 1.30 MG/DL    BUN/Creatinine ratio 20 12 - 20      GFR est AA >60 >60 ml/min/1.73m2    GFR est non-AA >60 >60 ml/min/1.73m2    Calcium 9.1 8.5 - 10.1 MG/DL    Bilirubin, total 1.0 0.2 - 1.0 MG/DL    ALT (SGPT) 19 12 - 78 U/L    AST (SGOT) 18 15 - 37 U/L    Alk. phosphatase 103 45 - 117 U/L    Protein, total 7.5 6.4 - 8.2 g/dL    Albumin 3.4 (L) 3.5 - 5.0 g/dL    Globulin 4.1 (H) 2.0 - 4.0 g/dL    A-G Ratio 0.8 (L) 1.1 - 2.2     SAMPLES BEING HELD    Collection Time: 06/25/21  1:16 PM   Result Value Ref Range    SAMPLES BEING HELD 1 RED, 1 ALVAREZ     COMMENT        Add-on orders for these samples will be processed based on acceptable specimen integrity and analyte stability, which may vary by analyte.    TROPONIN I    Collection Time: 06/25/21  1:16 PM   Result Value Ref Range    Troponin-I, Qt. <0.05 <0.05 ng/mL   NT-PRO BNP    Collection Time: 06/25/21  1:16 PM   Result Value Ref Range    NT pro-BNP 3,595 (H) <450 PG/ML   EKG, 12 LEAD, INITIAL    Collection Time: 06/25/21  2:19 PM   Result Value Ref Range    Ventricular Rate 89 BPM    Atrial Rate 96 BPM    QRS Duration 88 ms    Q-T Interval 326 ms    QTC Calculation (Bezet) 396 ms    Calculated R Axis 32 degrees    Calculated T Axis -55 degrees    Diagnosis       Atrial fibrillation  Low voltage QRS  Nonspecific T wave abnormality  When compared with ECG of 08-MAY-2008 04:29,  Previous ECG has undetermined rhythm, needs review  Nonspecific T wave abnormality, worse in Inferior leads  Nonspecific T wave abnormality now evident in Lateral leads     LACTIC ACID    Collection Time: 06/25/21  3:17 PM   Result Value Ref Range    Lactic acid 1.4 0.4 - 2.0 MMOL/L   COVID-19 RAPID TEST    Collection Time: 06/25/21  3:22 PM   Result Value Ref Range    Specimen source Nasopharyngeal      COVID-19 rapid test Not detected NOTD         Recent Labs     06/25/21  1316   WBC 4.9   HGB 11.1*   HCT 33.2*      Recent Labs     06/25/21  1316   *   K 4.1      CO2 29   BUN 20   CREA 0.99   *   CA 9.1     Recent Labs     06/25/21  1316   ALT 19      TBILI 1.0   TP 7.5   ALB 3.4*   GLOB 4.1*       Recent Labs     06/25/21  1316   TROIQ <0.05       No results for input(s): INR, PTP, APTT, INREXT in the last 72 hours. No results for input(s): PH, PCO2, PO2 in the last 72 hours. XR CHEST PORT  Narrative: EXAM:  XR CHEST PORT    INDICATION: Shortness of breath    COMPARISON: 5/18/2021    TECHNIQUE: Portable AP upright chest view at 1404 hours    FINDINGS: The cardiomediastinal contours are stable. The pulmonary vasculature  is within normal limits. There is a stable left lateral pleural effusion with resolution of the  previously seen air. There are diffuse interstitial and patchy airspace  opacities. There is no pneumothorax. The bones and upper abdomen are stable. Impression: 1. Diffuse interstitial and patchy airspace opacities can be seen with pulmonary  edema or atypical/viral infection. 2. Stable left lateral pleural effusion with resolution of the previously seen  air. Assessment & Plan: This is an 80-year-old gentleman with a history of metastatic breast cancer to the lungs, malignant pleural effusion presented with dyspnea and hypoxia. Chest x-ray showed diffuse airspace disease    #Acute hypoxic respiratory failure. Bacterial pneumonia, CAP in the setting of metastatic cancer and receiving chemotherapy. Fully vaccinated against Covid. Rapid Covid test negative.  -Admit to intermediate care unit. Supplemental oxygen via nasal cannula to keep SPO2 greater than 92%. Obtain CTA lung to rule out PE given the acuity of his symptoms. Continue levofloxacin and Zosyn as initiated in the ED for now. Blood culture were ordered. Lactic acid is normal.  proBNP is elevated however patient is not CHF nor does have history of.     Chronic atrial fibrillation, rate controlled. Not on anticoagulation. Patient had a history of hematuria, when he was diagnosed with bladder cancer according to him. Breast cancer metastatic to the lung, malignant left pleural effusion status post recent thoracentesis. The left pleural effusion is improved. Patient's Baseline: Ambulates with walking  DVT ppx: Lovenox  Code status: DNR. Patient is alert and oriented x4. We discussed about his wishes regarding chest compression, shock or intubation and mechanical ventilation in the event of cardiorespiratory arrest.  He clearly stated he wants to die naturally and would not want any resuscitative efforts. I explained to him that that is what we called DO NOT RESUSCITATE, he acknowledged. Surrogate decision maker/MPOA: bryan Sy. Disposition: And spit back on.                 Signed By: Hayley Sanchez MD     June 25, 2021

## 2021-06-25 NOTE — ROUTINE PROCESS
TRANSFER - OUT REPORT:    Verbal report given to IMCU RN (name) on Lexie Pear III  being transferred to [facility] (unit) for routine progression of care       Report consisted of patients Situation, Background, Assessment and   Recommendations(SBAR). Information from the following report(s) SBAR, ED Summary and MAR was reviewed with the receiving nurse. Lines:   Peripheral IV 06/25/21 Anterior; Left Antecubital (Active)        Opportunity for questions and clarification was provided.       Patient transported with:   [employer]

## 2021-06-25 NOTE — ED PROVIDER NOTES
80-year-old male presents from home via private vehicle with complaint of shortness of breath. Patient being treated for metastatic breast cancer. He has been intolerant of chemotherapy recently and had a L sided thoracentesis done on May 17 to drain the malignant pleural effusion. He reports having gradually increasing worsening shortness of breath over the past couple days. Symptoms are worse with exertion. Denies any cough or fever. No chest pain or abdominal pain. States he just has no energy and cannot walk more than a few steps without having to catch his breath. Heme/onc - Dario Lies           Past Medical History:   Diagnosis Date    Atrial fibrillation [facility])     [facility]     Breast cancer ([facility] 07/26/2011    Breast cancer, male ([facility] 07/05/2012    Colon polyps 7/26/2011    Glucose intolerance (impaired glucose tolerance) 10/31/2013    Prostate cancer ([facility] 07/26/2011    Typhoid fever 7/26/2011       Past Surgical History:   Procedure Laterality Date    HX CATARACT REMOVAL      HX MASTECTOMY      HX POLYPECTOMY      HX PROSTATECTOMY      HX TONSILLECTOMY      IR THORACENTESIS CATH W IMAGE  11/8/2019         No family history on file. Social History     Socioeconomic History    Marital status:      Spouse name: Not on file    Number of children: Not on file    Years of education: Not on file    Highest education level: Not on file   Occupational History    Not on file   Tobacco Use    Smoking status: Former Smoker     Packs/day: 2.00    Smokeless tobacco: Never Used   Substance and Sexual Activity    Alcohol use:  Yes     Alcohol/week: 5.8 standard drinks     Types: 7 Standard drinks or equivalent per week     Comment: A drink at night    Drug use: No    Sexual activity: Not on file   Other Topics Concern    Not on file   Social History Narrative    Not on file     Social Determinants of Health     Financial Resource Strain:     Difficulty of Paying Living Expenses:    Food Insecurity:     Worried About [address] in the Last Year:     [address] in the Last Year:    Transportation Needs:     Lack of Transportation (Medical):  Lack of Transportation (Non-Medical):    Physical Activity:     Days of Exercise per Week:     Minutes of Exercise per Session:    Stress:     Feeling of Stress :    Social Connections:     Frequency of Communication with Friends and Family:     Frequency of Social Gatherings with Friends and Family:     Attends Synagogue Services:     Active Member of Clubs or Organizations:     Attends Club or Organization Meetings:     Marital Status:    Intimate Partner Violence:     Fear of Current or Ex-Partner:     Emotionally Abused:     Physically Abused:     Sexually Abused: ALLERGIES: Patient has no known allergies. Review of Systems   Constitutional: Negative for diaphoresis and fever. HENT: Negative for facial swelling. Eyes: Negative for visual disturbance. Respiratory: Positive for shortness of breath. Negative for cough. Cardiovascular: Negative for chest pain. Gastrointestinal: Negative for abdominal pain. Genitourinary: Negative for dysuria. Musculoskeletal: Negative for joint swelling. Skin: Negative for rash. Neurological: Negative for headaches. Hematological: Negative for adenopathy. Psychiatric/Behavioral: Negative for suicidal ideas. Vitals:    06/25/21 1319 06/25/21 1330 06/25/21 1400   BP: 123/65 110/70 105/64   Pulse: 88 84 82   Resp: 30 27 (!) 31   Temp: 98.3 °F (36.8 °C)     SpO2: 94% 97% 97%            Physical Exam  Vitals and nursing note reviewed. Constitutional:       General: He is not in acute distress. Appearance: He is well-developed. HENT:      Head: Normocephalic and atraumatic. Eyes:      Pupils: Pupils are equal, round, and reactive to light. Cardiovascular:      Rate and Rhythm: Normal rate. Rhythm irregular.    Pulmonary: Effort: Tachypnea and accessory muscle usage present. No respiratory distress. Breath sounds: Examination of the left-upper field reveals decreased breath sounds. Examination of the left-middle field reveals decreased breath sounds. Examination of the left-lower field reveals decreased breath sounds. Decreased breath sounds present. Abdominal:      General: There is no distension. Musculoskeletal:         General: Normal range of motion. Cervical back: Normal range of motion and neck supple. Skin:     General: Skin is warm and dry. Neurological:      Mental Status: He is alert and oriented to person, place, and time. MDM  Number of Diagnoses or Management Options     Amount and/or Complexity of Data Reviewed  Clinical lab tests: reviewed  Tests in the radiology section of CPT®: reviewed  Tests in the medicine section of CPT®: reviewed           Perfect Serve Consult for Admission  2:43 PM    ED Room Number: ER27/27  Patient Name and age: Lisseth Ramírez III 80 y.o.  male  Working Diagnosis:   1. Pleural effusion    2. Pulmonary infiltrates    3. Metastatic breast cancer ([address]    4. Acute respiratory failure with hypoxia ([facility]        COVID-19 Suspicion:  yes  Sepsis present:  no  Reassessment needed: no  Code Status:  Full Code  Readmission: no  Isolation Requirements:  Other  Recommended Level of Care:  telemetry  Department:[facility] Adult ED - 21   Other:  Currently on 4LNC sats in low 90's. CXR showing b/l patchy infilitrates. Stable pleural effusion. No fever or WBC. COVID pending. Not on home O2.    2:47 PM  I have spent 35 minutes of critical care time involved in lab review, consultations with specialist, family decision-making, and documentation. During this entire length of time I was immediately available to the patient and/or family.       Procedures

## 2021-06-25 NOTE — PROGRESS NOTES
6/25/2021; 16:00 -     TRANSITIONS OF CARE PLAN:   1. RUR: 11%  2. DESTINATION: TBD - likely own home  3. TRANSPORT: TBD - family vs BLS  4. NEEDS FOR DISCHARGE: TBD: potentially Home O2, possibly Home Hospice  5. ANTICIPATED FOLLOW UPS: TBD: PCP, Hem-Onc  6. ONGOING INPATIENT NEEDS: Supplemental O2 with attempting to wean as possible, CTA, ABX, Cultures, Monitoring of labs    Anticipated Discharge is: Greater than 48 Hours    Reason for Admission:  Acute Respiratory Failure, PNA                 RUR Score:    11%                 Plan for utilizing home health:     TBD - potentially Home Hospice     PCP: First and Last name:  Kavon Gleason MD     Name of Practice: [facility]   Are you a current patient: Yes/No: Yes   Approximate date of last visit: 9/14/2020   Can you participate in a virtual visit with your PCP:                     Current Advanced Directive/Advance Care Plan: DNR    Healthcare Decision Maker:   Click here to complete [place] including selection of the Healthcare Decision Maker Relationship (ie \"Primary\")            Kenrick Graves Opal: [phone]                  Transition of Care Plan:  CM reviewed patient with  NP due to patient's clinical presentation and current home environment. Patient has PMH of breast cancer with mets to multiple sites, including suspected progression to the lungs. Patient recently had a thoracentesis that showed multiple lung nodules. Per  NP, patient [place] in [place]. Patient lives alone in a 3 story home, but his son's family lives on the same property approximately 1 mile away. Patient's son is currently working in [facility] and will be returning to [employer] [facility] [employer] next week. Patient's daughter in law is supportive and available as needed. Patient has remained fully independent, including driving, living alone, and continued caring for his multiple gardens.                      CM met with patient with patient alert and oriented x4 to discuss potential discharge planning. Patient identified that his goal is to remain within his own home environment up to his death. Patient identified that his oncologist opted to stop his chemo for a minimum of 3 weeks due to the side effects. Patient is very familiar and comfortable with in home hospice services due to hx of the services with his spouse, who had brain cancer. CM discussed potential of submitting initial meet and greet referrals to agencies while patient's son is still in town. Patient is in agreement with no preference for agencies. CM to submit referrals to [facility], [facility], and [facility] via [employer] with the note: ONLY initial meet and greet referral for while the patient's son is in the area (he works in [employer]) - please plan to see this weekend. ORDER WILL FOLLOW ONCE PATIENT DECIDES IF HOSPICE IS THE ROUTE THAT HE WANTS TO GO; patient is fully alert and oriented. He lives at home alone, but family lives on same property within 1 mile: will likely need 24/7 caregivers as well. Goal is for the patient to return to the family [facility] in [place]. Patient is being admitted through the weekend to treat the PNA - currently to [facility] Room 419.   Call Weekend TORY Team for details: [phone]      CRM: Keith Franco, MPH,  Barrington Combs; Z: [phone]

## 2021-06-25 NOTE — ACP (ADVANCE CARE PLANNING)
Advance Care Planning     Advance Care Planning (ACP) Physician/NP/PA Conversation      Date of Conversation: 6/25/2021  Conducted with: Patient with Decision Making Capacity    Healthcare Decision Maker:     Click here to complete [address] including [address] Relationship (ie \"Primary\")  Today we documented Decision Maker(s) consistent with ACP documents on file. Care Preferences:    Hospitalization: \"If your health worsens and it becomes clear that your chance of recovery is unlikely, what would be your preference regarding hospitalization? \"  The patient would prefer hospitalization. Ventilation: \"If you were unable to breathe on your own and your chance of recovery was unlikely, what would be your preference about the use of a ventilator (breathing machine) if it was available to you? \"   The patient would NOT desire the use of a ventilator. Resuscitation: \"In the event your heart stopped as a result of an underlying serious health condition, would you want attempts to be made to restart your heart, or would you prefer a natural death? \"   No, do NOT attempt to resuscitate.     Additional topics discussed: treatment goals    Conversation Outcomes / Follow-Up Plan:   ACP complete - no further action today  Reviewed DNR/DNI and patient elects DNR order - completed portable DNR form & placed order     Length of Voluntary ACP Conversation in minutes:  20 minutes    Russell Manzo MD

## 2021-06-25 NOTE — SENIOR SERVICES NOTE
TRST 2, SSED Visit. Chart reviewed: Metastatic cancer, breast, bladder, prostate, Atrial fibrillation, impaired glucose tolerance, colon polyps, typhoid fever and recent Left side Thoracentesis. Here today for SOB and requiring oxygen. Patient greeted in room, on 5L nasal cannula, patient is hard of hearing, no difficulty communicating with increased speech. Oriented to person, place, , month/year and son present at the bedside. I introduced myself and role as SSED NP. Patient with noted dyspnea at rest. Minimal screening performed, son, Broderick Creed assisting with conversation and discussion. ADL/IADLs: Fully independent, will get help from daughter whom [place] 1 mile away) when needed. Son- Johnny Hogan is currently living/working in [facility], home for a visit but will return to [facility] the beginning of July. Depression: Negative  Delirium: Patient endorses being dyslexic and already knows that his short term memory isn't good, through conversation appears to be a pretty good historian. Medication Reconciliation: Performed and updated with patient whom solely manages his medications, endorses that he stopped the chemo drug- Adalat this past Tuesday due to increased side effects. ACP: Documentation performed . Home  -Lives in a 3 Story home ([place] [address]), lives alone, son (Lives in [facility]) visiting and will leave next week, however his wife lives on the same property approximately 1 mile away and are very supportive when needed. -Main living and requires a few steps to get to lower level with full bathroom.   -Patient states that he is still mowing, mowed last Tuesday. Resources provided:  -Assisted Living Locators  -Care Patrol  -Private Duty Caregiver List  -[address] education pamphlet    Patient, son and I had a long discussion about wishes for the future.  Patient stating that he does not want to leave his beautiful home, where he can see the river and enjoy working in his gardens. Son at the bedside very supportive, endorsing that they can make it happen for him to stay at home. We discussed the Metastatic Cancer, he expressed how the chemo and the side effects were not good, stated that he saw his wife through Hospice at home and had a very good experience with it. Patient knows that he will need more help at this time, possibly even being discharged home with oxygen. Patient was very receptive to resources provided, aware that the inpatient team will follow and that a CM will assist with discharge planning. Thank you for the SSED Consult.    Monica Claros NP  SSESABRINA Barrera NP  [phone]

## 2021-06-25 NOTE — ED TRIAGE NOTES
Triage: Pt arrives from home with CC of SOB. Hx of lung cancer and just recently stopped chemo. Arrives with 02 saturations of 78% on RA. Placed on 6L NC in triage. O2 sats 88.

## 2021-06-25 NOTE — PROGRESS NOTES
Admission Medication Reconciliation:    Information obtained from:  patient  RxQuery data available¹: Yes    Comments/Recommendations: Updated PTA meds/reviewed patient's allergies. 1)  Medication changes (since last review): Added  Omeprazole     Adjusted  - n/a    Removed  - n/a         ¹RxQuery pharmacy benefit data reflects medications filled and processed through the patient's insurance, however   this data does NOT capture whether the medication was picked up or is currently being taken by the patient. Allergies:  Patient has no known allergies. Significant PMH/Disease States:   Past Medical History:   Diagnosis Date    Atrial fibrillation [facility])     Bladder cancer ([facility]     Breast cancer ([facility] 07/26/2011    Breast cancer, male ([facility] 07/05/2012    Colon polyps 7/26/2011    Glucose intolerance (impaired glucose tolerance) 10/31/2013    Prostate cancer ([facility] 07/26/2011    Typhoid fever 7/26/2011     Chief Complaint for this Admission:    Chief Complaint   Patient presents with    Shortness of Breath     Prior to Admission Medications:   Prior to Admission Medications   Prescriptions Last Dose Informant Taking? CYANOCOBALAMIN, VITAMIN B-12, (VITAMIN B-12 PO) 6/25/2021 at 0900  Yes   Sig: Take 3,000 mcg by mouth daily. MULTIVITAMIN (MULTIPLE VITAMINS PO) 6/25/2021 at 0900  Yes   Sig: Take  by mouth daily. NIFEdipine ER (ADALAT CC) 30 mg ER tablet 6/25/2021 at 0900  Yes   Sig: TAKE 1 TABLET BY MOUTH DAILY   aspirin delayed-release 81 mg tablet 6/25/2021 at 0900  Yes   Sig: Take  by mouth daily. cholecalciferol (VITAMIN D3) 1,000 unit tablet 6/25/2021 at 0900  Yes   Sig: Take 1,000 Units by mouth daily. digoxin (LANOXIN) 0.25 mg tablet 6/25/2021 at 0900  Yes   Sig: TAKE 1 TABLET BY MOUTH EVERY DAY   diphenoxylate-atropine (LOMOTIL) 2.5-0.025 mg per tablet 6/18/2021 at Unknown time  Yes   Sig: Take  by mouth four (4) times daily as needed for Diarrhea.    omeprazole (PriLOSEC OTC) 20 mg tablet 6/25/2021 at Unknown time  Yes   Sig: Take 20 mg by mouth daily. ondansetron hcl (ZOFRAN) 8 mg tablet 6/18/2021 at Unknown time  Yes   Sig: Take 8 mg by mouth every eight (8) hours as needed for Nausea or Vomiting. Facility-Administered Medications: None     Please contact the main inpatient pharmacy with any questions or concerns at [phone] and we will direct you to the clinical pharmacist covering this patient's care while in-house.    Kanwal Broussard, [facility]

## 2021-06-26 NOTE — PROGRESS NOTES
Renal Dosing/Monitoring  Medication: Levofloxacin   Current regimen:  750 mg IV every 48 hr  Recent Labs     06/26/21  0418 06/25/21  1316   CREA 0.61* 0.99   BUN 19 20     Estimated CrCl:  65.8 ml/min  Plan: Change to 750 mg IV every 24 hours per [facility] P&T Committee Protocol with respect to renal function. Pharmacy will continue to monitor patient daily and will make dosage adjustments based upon changing renal function.

## 2021-06-26 NOTE — PROGRESS NOTES
2100 Report received from [facility]. 2330 Bedside and Verbal shift change report given to Dina (oncoming nurse) by Yesica Kingston (offgoing nurse). Report included the following information SBAR, Kardex, Intake/Output, MAR and Recent Results.

## 2021-06-26 NOTE — PROGRESS NOTES
Hospitalist Progress Note              Neo Larson MD.                                                             Cell: [phone]                               NAME:  Lula Mejía III  :  1932  MRN:  [id number]  Date of Service:  2021    Summary: 80 y.o. male who presented on 2021 with worsening shortness of breath. He has a history of malignant neoplasm of the breast.  In the ER, he was hypoxic with spo2 in the upper 70's. CXR performed showed diffuse Insterstitial and patchy airspace opacities       Assessment/Plan:  Acute hypoxic respiratory failure requiring high flow oxygen  CTA perform showed no evidence of PE. Chronic large complex left pleural effuson, chronic mild mediastinal lymphadenopathy  Continue Zosyn 3.375 gm every 8hrs  We will perform left sided thoracentensis with pleural fluid analysis including cell count, albumin, LDH and cytology  Wean of high flow oxygen when able  Strict pulse oximetry monitoring    Large left pleural effusion: This is due to malignant left pleural effusion  Last thoracentesis was on . Patient states last thora was extremely painful and would not like to have a repeat thora unless he is put to sleep  - Continue oxygen via NC  - Start lasix 20 mg I.V BID    H/o malignant neoplasm of the breast       Code status: dnr  DVT prophylaxsis:lovenox  Dispo:Tbd. Likely home with hospice       Interval History/Subjective:  Feels sob  Does not want repeat thoracentesis unless he is put to sleep  He would like to go home on hospice    Review of Systems:  Pertinent items are noted in HPI. Objective:     VITALS:   Last 24hrs VS reviewed since prior progress note.  Most recent are:  Visit Vitals  /81 (BP 1 Location: Left arm, BP Patient Position: At rest)   Pulse 91   Temp 98.2 °F (36.8 °C)   Resp (!) 34   Ht 5' 9\" (1.753 m)   Wt 65 kg (143 lb 3.2 oz)   SpO2 97%   BMI 21.15 kg/m²       Intake/Output Summary (Last 24 hours) at 6/26/2021 6412  Last data filed at 6/25/2021 2200  Gross per 24 hour   Intake    Output 150 ml   Net -150 ml        PHYSICAL EXAM:  General: No acute distress, cooperative, pleasant   EENT: EOMI. Anicteric sclerae. Oral mucous moist, oropharynx benign  Resp: Reduce BS left lung compared to right. Mild accessory muscle use. Tachypnic  CV: Regular rhythm, normal rate, no murmurs, gallops, rubs  GI: Soft, non distended, non tender. normoactive bowel sounds, no hepatosplenomegaly Extremities: No edema, warm, 2+ pulses throughout  Neurologic: Moves all extremities. AAOx3, CN II-XII grossly intact  Psych: Good insight. Not anxious nor agitated. Skin: Good Turgor, no rashes or ulcers    Lab Data Personally Reviewed: (see below)     Medications list Personally Reviewed:  x YES  NO     _______________________________________________________________________  Care Plan discussed with:  Patient/Family and Nurse    Total NON critical care TIME:  30 minutes    Sherell Severs, MD     Procedures: see electronic medical records for all procedures/Xrays and details which were not copied into this note but were reviewed prior to creation of Plan. LABS:  Recent Labs     06/26/21  0418 06/25/21  1316   WBC 4.3 4.9   HGB 10.1* 11.1*   HCT 30.3* 33.2*   * 150     Recent Labs     06/26/21  0418 06/25/21  1316    135*   K 4.3 4.1    101   CO2 26 29   BUN 19 20   CREA 0.61* 0.99   * 182*   CA 8.9 9.1   MG 2.1  --      Recent Labs     06/26/21  0418 06/25/21  1316   ALT 13 19   AP 86 103   TBILI 0.9 1.0   TP 6.5 7.5   ALB 2.8* 3.4*   GLOB 3.7 4.1*     No results for input(s): INR, PTP, APTT, INREXT in the last 72 hours. No results for input(s): FE, TIBC, PSAT, FERR in the last 72 hours. No results found for: FOL, RBCF   No results for input(s): PH, PCO2, PO2 in the last 72 hours.   Recent Labs     06/25/21  1316   TROIQ <0.05     Lab Results   Component Value Date/Time Cholesterol, total 147 09/14/2020 12:46 PM    HDL Cholesterol 58 09/14/2020 12:46 PM    LDL, calculated 69 09/14/2020 12:46 PM    Triglyceride 109 09/14/2020 12:46 PM     Lab Results   Component Value Date/Time    Glucose (POC) 98 05/18/2021 03:12 PM    Glucose (POC) 102 (H) 11/13/2020 01:05 PM     Lab Results   Component Value Date/Time    Color RED 09/20/2018 08:17 AM    Appearance CLOUDY (A) 09/20/2018 08:17 AM    Specific gravity 1.025 09/20/2018 08:17 AM    pH (UA) 7.0 09/20/2018 08:17 AM    Protein 100 (A) 09/20/2018 08:17 AM    Glucose NEGATIVE  09/20/2018 08:17 AM    Ketone TRACE (A) 09/20/2018 08:17 AM    Bilirubin NEGATIVE  09/20/2018 08:17 AM    Urobilinogen 0.2 09/20/2018 08:17 AM    Nitrites NEGATIVE  09/20/2018 08:17 AM    Leukocyte Esterase TRACE (A) 09/20/2018 08:17 AM    Epithelial cells FEW 09/20/2018 08:17 AM    Bacteria NEGATIVE  09/20/2018 08:17 AM    WBC 5-10 09/20/2018 08:17 AM    RBC >100 (H) 09/20/2018 08:17 AM

## 2021-06-26 NOTE — PROGRESS NOTES
Bedside shift change report given to Yesica Kingston (oncoming nurse) by Ceci Donahue (offgoing nurse). Report included the following information SBAR and Kardex       Spoke with April from [facility]. Pt has been accepted.     Primary Nurse Maryuri Hdz and Yesica Kingston, RN performed a dual skin assessment on this patient Impairment noted- see wound doc flow sheet  David score is 16  Non blanchable sacrum, wound to penis

## 2021-06-27 NOTE — PROGRESS NOTES
0000 Assumed care of patient. A&Ox4, moving spontaneously x4. VSS in AFIB. Reporting mild discomfort, repositioned and bathed. Peripheral access. Raw/tender genitalia. Patient using urinal but is also experiencing leakage. Zinc and incontinents pad applied around.

## 2021-06-27 NOTE — HOSPICE
[facility]  [phone]     Patient Name: Vika Terry III  YOB: 1932  Age: 80 y.o. [facility] RN Note:  Hospice consult received, reviewing chart. Will follow up with Unit Nurse and Care Manager to discuss plan of care, patient status and discharge disposition within the hour. Thank you for the opportunity to be of service to this patient. Met with son and patient to review hospice services. The son lives in [facility] [place] and will have to hire paid caregivers. His other son lives in [place]. We will follow up tomorrow to see how patient is doing and review equipment needs.      Bhaskar Lewis RN  [phone] c  [phone] o

## 2021-06-27 NOTE — PROGRESS NOTES
Hospitalist Progress Note              Irais Martínez MD.                                                             Cell: [phone]                               NAME:  Macrina Pardo III  :  1932  MRN:  [id number]  Date of Service:  2021    Summary: 80 y.o. male who presented on 2021 with worsening shortness of breath. He has a history of malignant neoplasm of the breast.  In the ER, he was hypoxic with spo2 in the upper 70's. CXR performed showed diffuse Insterstitial and patchy airspace opacities       Assessment/Plan:  Acute hypoxic respiratory failure requiring high flow oxygen  CTA perform showed no evidence of PE. Chronic large complex left pleural effuson, chronic mild mediastinal lymphadenopathy  On Zosyn 3.375 gm every 8hrs and Levaquin due to suspected superimposed bacterial Pneumonia  Wean off high flow oxygen if able  Strict pulse oximetry monitoring    Bacteria Pneumonia  CTA chest shows interval development of severe multilobar airspace disease suggestive of COVID 19 PNA  COVID 19 rapid test was not detected  Continue current abx    Large left pleural effusion: This is due to malignant left pleural effusion in the setting of PNA  Last thoracentesis was on . Patient states last thoracentesis was extremely painful and would not like to have a repeat thoracentesis unless he is put to sleep  - Continue oxygen via NC  - Continue lasix 20 mg I.V BID  - Consider Pleurx catheter and discharge home on hospice    H/o malignant neoplasm of the breast  Requiring chemotherapy. He says he has stopped chemo this past Tuesday due to unwanted side effected    H/o PAF  He is off eliquis       Code status: dnr  DVT prophylaxsis:lovenox  Dispo:Tbd. Likely home with hospice. Consult Hospice       Interval History/Subjective:  No acute overnight event  He feels SOB on mild exertion.    He spiked a fever yesterday   Blood cultures no growth till date    Review of Systems:  Pertinent items are noted in HPI. Objective:     VITALS:   Last 24hrs VS reviewed since prior progress note. Most recent are:  Visit Vitals  /76   Pulse 90   Temp 98.9 °F (37.2 °C)   Resp (!) 31   Ht 5' 9\" (1.753 m)   Wt 65 kg (143 lb 3.2 oz)   SpO2 96%   BMI 21.15 kg/m²       Intake/Output Summary (Last 24 hours) at 6/27/2021 0854  Last data filed at 6/27/2021 0201  Gross per 24 hour   Intake 520 ml   Output 600 ml   Net -80 ml        PHYSICAL EXAM:  General: No acute distress, cooperative, pleasant   EENT: EOMI. Anicteric sclerae. Oral mucous moist, oropharynx benign  Resp: Reduce BS left lung compared to right. Mild accessory muscle use. Tachypnic  CV: Regular rhythm, normal rate, no murmurs, gallops, rubs  GI: Soft, non distended, non tender. normoactive bowel sounds, no hepatosplenomegaly Extremities: No edema, warm, 2+ pulses throughout  Neurologic: Moves all extremities. AAOx3, CN II-XII grossly intact  Psych: Good insight. Not anxious nor agitated. Skin: Good Turgor, no rashes or ulcers    Lab Data Personally Reviewed: (see below)     Medications list Personally Reviewed:  x YES  NO     _______________________________________________________________________  Care Plan discussed with:  Patient/Family and Nurse    Total NON critical care TIME:  30 minutes    Mal Negron MD     Procedures: see electronic medical records for all procedures/Xrays and details which were not copied into this note but were reviewed prior to creation of Plan.       LABS:  Recent Labs     06/26/21  0418 06/25/21  1316   WBC 4.3 4.9   HGB 10.1* 11.1*   HCT 30.3* 33.2*   * 150     Recent Labs     06/26/21  0418 06/25/21  1316    135*   K 4.3 4.1    101   CO2 26 29   BUN 19 20   CREA 0.61* 0.99   * 182*   CA 8.9 9.1   MG 2.1  --      Recent Labs     06/26/21  0418 06/25/21  1316   ALT 13 19   AP 86 103   TBILI 0.9 1.0   TP 6.5 7.5   ALB 2.8* 3.4*   GLOB 3.7 4.1* No results for input(s): INR, PTP, APTT, INREXT, INREXT in the last 72 hours. No results for input(s): FE, TIBC, PSAT, FERR in the last 72 hours. No results found for: FOL, RBCF   No results for input(s): PH, PCO2, PO2 in the last 72 hours.   Recent Labs     06/25/21  1316   TROIQ <0.05     Lab Results   Component Value Date/Time    Cholesterol, total 147 09/14/2020 12:46 PM    HDL Cholesterol 58 09/14/2020 12:46 PM    LDL, calculated 69 09/14/2020 12:46 PM    Triglyceride 109 09/14/2020 12:46 PM     Lab Results   Component Value Date/Time    Glucose (POC) 98 05/18/2021 03:12 PM    Glucose (POC) 102 (H) 11/13/2020 01:05 PM     Lab Results   Component Value Date/Time    Color RED 09/20/2018 08:17 AM    Appearance CLOUDY (A) 09/20/2018 08:17 AM    Specific gravity 1.025 09/20/2018 08:17 AM    pH (UA) 7.0 09/20/2018 08:17 AM    Protein 100 (A) 09/20/2018 08:17 AM    Glucose NEGATIVE  09/20/2018 08:17 AM    Ketone TRACE (A) 09/20/2018 08:17 AM    Bilirubin NEGATIVE  09/20/2018 08:17 AM    Urobilinogen 0.2 09/20/2018 08:17 AM    Nitrites NEGATIVE  09/20/2018 08:17 AM    Leukocyte Esterase TRACE (A) 09/20/2018 08:17 AM    Epithelial cells FEW 09/20/2018 08:17 AM    Bacteria NEGATIVE  09/20/2018 08:17 AM    WBC 5-10 09/20/2018 08:17 AM    RBC >100 (H) 09/20/2018 08:17 AM

## 2021-06-27 NOTE — PROGRESS NOTES
NINI: anticipate d/c home with [employer] private pay caregivers; Hospice provider placed in AVS; Anticipate BLS Transport; IMM letter provided 6/27    RUR: 13%    1230-CM reviewed pt chart & noted CM consult for hospice services. CM met with pt at bedside to discuss hospice agency choices. Pt agreeable to [facility]; referral placed. CM to follow for transitions of care. 1400[facility] with Major Cohen in regards to Hospice referral placed. Major Cohen advised that either Miles Thomas or Mariana Mei will be seeing patient. CM informed nurse of plan. CM to follow. 1500-CM spoke with nurse, who advised that hospice team met with pt and son at bedside and plan is for home with hospice and son intends to hire 24/7 private pay caregivers. Plan for d/c once 24/7 private pay caregivers have been arranged by pt's son. CM to follow. Courtney STEINBERGN CCM Medicare pt has received, reviewed, and signed 2nd IM letter informing them of their right to appeal the discharge. Signed copy has been placed on pt bedside chart.

## 2021-06-28 NOTE — HOSPICE
Hospice Liaison Note:    Chart reviewed for updates in plan of care. Plan: Review patient for uncontrolled symptoms. Will meet with patient and family to continue with home with hospice plan of care. Please call Hospice team to offer support for patient, family or staff. Thank you for your coordination with the hospice plan of care    12:12: Bedside visit with patient and his son, Melanie Gross and daughter-in-law, Yadira Miller. Pt states that he is having a difficult day, feeling anxious and short of breath. Pt appears to be alert and oriented. He states his goal is to get home, after his family has arranged hired caregivers for him. Reviewed plan with Tiffany Iniguez. [address] has accepted patient for home hospice services once caregivers have been arranged. Reviewed levels of care with patient and his family. Patient may meet GIP LOC for hospice services. His symptoms are anxiety and shortness of breath. Pt has been on 6 lpm NC and is now on 11 lpm Mid-Flow. For patient to transfer home, he would need to be on 8 lpm or less oxygen. 14:00: Bedside rounds with Dr. Ivory Maurer. Pt has Contact sign on his door. Per unit nurse, COVID PCR was ordered, and family left bedside. Pt is now on non-rebreather face mask for oxygen. Reviewed hospice services location with Dr. Liset Anderson, hospice medical director. Pt can not transfer to the [facility] until his PCR results have returned and are negative. Pt is meeting GIP LOC and plan to offer inpatient admission to patient/family. 15:30: Voicemail left for patient's son, Melanie Gross. 16:00: 2nd voicemail left for Melanie Gross. Will continue to try to reach family to discus hospice services and location. Plan to address IV antibiotic therapy at that time. Dr. Milan Lindsey updated through [address] [place] [address].       Kayla Olson RN, [place] [address] Nurse Liaison  [phone] [place]  [phone] Office

## 2021-06-28 NOTE — HOSPICE
[facility]  [facility]  [phone]    Patient Name: Krao Roblse III  YOB: 1932  Age: 80 y.o. [facility]  H[facility]ice consult noted. Chart reviewed. Plan of care discussed with patients nurse & care manager. This LCSW and Elijah Noble RN into meet with pts, who is SOB on mid-flow 02, an his son Haseeb Tobin and DIL Gabriela Cespedes. Discussed Hospice philosophy, general plan of care, levels of care, services and on call procedures. Pt/family's goal is for pt to return home with hospice care. Pt does meet the [facility] level of care. We can offer inpt admission with the goal to get pt home once symptoms are better managed. LCSW tried to call pts zohaib Prabhakar ( [phone]) to arrange hospice admission. LCSW could not leave , Hospice RN Elijah Noble called zohaib Wolfe ( [phone]) and left  for him to return call. LCSW called pts [facility] V[facility]il, to update. Family information packet provided. Thank you for the opportunity to be of service to Mr. Arsen Vizcaino and his family.     [facility], [address] [place] [address] [place]  [phone]

## 2021-06-28 NOTE — PROGRESS NOTES
1957: Bedside shift change report given to Alirio Li RN (oncoming nurse) by [facility], RN (offgoing nurse). Report included the following information SBAR, Intake/Output, MAR, Recent Results, Med Rec Status and Cardiac Rhythm atrial fib.

## 2021-06-28 NOTE — ACP (ADVANCE CARE PLANNING)
Advance Care Planning     Advance Care Planning Activator (Inpatient)  Conversation Note      Date of ACP Conversation: 06/28/21     Conversation Conducted with:   Patient with Decision Making Capacity    ACP Activator: Kamilla Montero RN CRM  Health Care Decision Maker:    Current Designated Health Care Decision Maker:     Primary Decision Maker: Lorayne Dandy - [phone]    Secondary Decision Maker: Michelle Martin - Daughter-in-Law - [phone]  Click here to complete [address] including selection of the Healthcare Decision Maker Relationship (ie \"Primary\")      Care Preferences    Ventilation: \"If you were in your present state of health and suddenly became very ill and were unable to breathe on your own, what would your preference be about the use of a ventilator (breathing machine) if it were available to you? \"      If patient would desire the use of a ventilator (breathing machine), answer \"yes\", if not \"no\":no    \"If your health worsens and it becomes clear that your chance of recovery is unlikely, what would your preference be about the use of a ventilator (breathing machine) if it were available to you? \"     Would the patient desire the use of a ventilator (breathing machine)? NO      Resuscitation  \"CPR works best to restart the heart when there is a sudden event, like a heart attack, in someone who is otherwise healthy. Unfortunately, CPR does not typically restart the heart for people who have serious health conditions or who are very sick. \"    \"In the event your heart stopped as a result of an underlying serious health condition, would you want attempts to be made to restart your heart (answer \"yes\" for attempt to resuscitate) or would you prefer a natural death (answer \"no\" for do not attempt to resuscitate)? \" no  [x] Yes  [] No   Educated Savi / Ivey Ormond regarding differences between Advance Directives and portable DNR orders.     Length of ACP Conversation in minutes:  10 with patient, son Rene Mariano and daughter in law.   Conversation Outcomes:  [x] ACP discussion completed  [] Existing advance directive reviewed with patient; no changes to patient's previously recorded wishes     [] New Advance Directive completed   [] Portable Do Not Resuscitate prepared for Provider review and signature  [] POLST/POST/MOLST/MOST prepared for Provider review and signature      Follow-up plan:    [] Schedule follow-up conversation to continue planning  [] Referred individual to Provider for additional questions/concerns   [] Advised patient/agent/surrogate to review completed ACP document and update if needed with changes in condition, patient preferences or care setting     [] This note routed to one or more involved healthcare providers

## 2021-06-28 NOTE — WOUND CARE
WO Note:     New consult placed for assessment of sacrum and penis. Assessed in room 419. Chart reviewed. Admitted DX:  Acute respiratory failure   Pneumonia   Past Medical History:   Diagnosis Date    [facility]     Bladder cancer [facility]     Breast cancer ([facility] 07/26/2011    Breast cancer, male ([facility] 07/05/2012    Colon polyps 7/26/2011    Glucose intolerance (impaired glucose tolerance) 10/31/2013    Prostate cancer ([facility] 07/26/2011    Typhoid fever 7/26/2011     Assessment:   Patient is A&O x 3, communicative and requires assist of 1 with repositioning. Bed: Med/Surg samara with air blower   Patient reports no pain. Heels offloaded with pillows. Heels intact with blanching pink erythema. 1. POA Sacrum, Deep Tissue Pressure Injury: 8 x 7 x 0 cm  Non blanching purple and red erythema. 2.  POA Penis, on shaft just below the glans. There is a partial thickness wound with pink wound bed. Wound, Pressure Prevention & Skin Care Recommendations:    1. Minimize layers of linen/pads under patient to optimize support surface. 2.  Turn/reposition approximately every 2 hours and offload heels. 3.  Manage moisture/ Keep skin folds clean and dry. 4.  Sacrum, buttocks and heels:  Venelex TID. 5.  Penis:  Bacitracin TID    Discussed above plan with patient and Tonja RN.     Transition of Care: Plan to follow as needed while admitted to hospital.    MAUREEN Jimenes RN  [facility] Inpatient Wound Care  Available on Perfect Serve  Pager [phone]  Office [phone]

## 2021-06-28 NOTE — CONSULTS
Name: Susana Parker: Faby Barraza 55   : 1932 Admit Date: 2021   Phone: [phone]  Room: [facility]   PCP: Juliette Garcia MD  MRN: [id number]   Date: 2021  Code: DNR        HPI:    3:49 PM       History was obtained from patient. I was asked by Nicole David MD to see Tom Woodall III in consultation for a chief complaint of shortness of breath. History of Present Illness: 80year old male with past medical history as given below who presented to [facility] with increased shortness of breath and cough. 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 In the ED his sats was70's. On Abemaciclib (verzenio) for more than one year for breast cancer with mets to lung. Patient has not been feeling well for the past 10 days. He has cough mostly dry for the past 10 days along with chills. He denies documented fever. CXR performed showed diffuse Insterstitial and patchy airspace opacities. CT Chest with left side pleural effusion and right side GGO. Data -  Wbc 3.9 (N 83%). D-dimer 16.54 - cta chest - no pe. Cr 0.79  NT pro-BNP 1696. PCT 0.94  COVID-19 rapid antigen - negative. Old PET/Ct reviewed - left side pleural effusion/ was tapped in 2021 / malignant by cytology . Past Medical History:   Diagnosis Date    Atrial fibrillation ([address]     Bladder cancer ([address]     Breast cancer ([address] 2011    Breast cancer, male ([address] 2012    Colon polyps 2011    Glucose intolerance (impaired glucose tolerance) 10/31/2013    Prostate cancer ([address] 2011    Typhoid fever 2011       Past Surgical History:   Procedure Laterality Date    HX CATARACT REMOVAL      HX MASTECTOMY      HX POLYPECTOMY      HX PROSTATECTOMY      HX TONSILLECTOMY      IR THORACENTESIS CATH W IMAGE  2019       No family history on file.     Social History     Tobacco Use    Smoking status: Former Smoker     Packs/day: 2.00    Smokeless tobacco: Never Used   Substance Use Topics    Alcohol use: Yes     Alcohol/week: 5.8 standard drinks     Types: 7 Standard drinks or equivalent per week     Comment: A drink at night       No Known Allergies    Current Facility-Administered Medications   Medication Dose Route Frequency    LORazepam (ATIVAN) tablet 0.5 mg  0.5 mg Oral Q6H PRN    bacitracin 500 unit/gram packet 1 Packet  1 Packet Topical TID    balsam peru-castor oiL (VENELEX) ointment   Topical TID    furosemide (LASIX) injection 20 mg  20 mg IntraVENous BID    levoFLOXacin (LEVAQUIN) 750 mg in D5W IVPB  750 mg IntraVENous Q24H    sodium chloride (NS) flush 5-40 mL  5-40 mL IntraVENous Q8H    sodium chloride (NS) flush 5-40 mL  5-40 mL IntraVENous PRN    acetaminophen (TYLENOL) tablet 650 mg  650 mg Oral Q6H PRN    Or    acetaminophen (TYLENOL) suppository 650 mg  650 mg Rectal Q6H PRN    polyethylene glycol (MIRALAX) packet 17 g  17 g Oral DAILY PRN    ondansetron (ZOFRAN ODT) tablet 4 mg  4 mg Oral Q8H PRN    Or    ondansetron (ZOFRAN) injection 4 mg  4 mg IntraVENous Q6H PRN    enoxaparin (LOVENOX) injection 40 mg  40 mg SubCUTAneous DAILY    digoxin (LANOXIN) tablet 0.25 mg  0.25 mg Oral DAILY    aspirin delayed-release tablet 81 mg  81 mg Oral DAILY    NIFEdipine ER (PROCARDIA XL) tablet 30 mg  30 mg Oral DAILY    piperacillin-tazobactam (ZOSYN) 3.375 g in 0.9% sodium chloride (MBP/ADV) 100 mL MBP  3.375 g IntraVENous Q8H         REVIEW OF SYSTEMS   Negative except as stated in the HPI. Physical Exam:   Visit Vitals  /64 (BP 1 Location: Right upper arm, BP Patient Position: At rest;Sitting)   Pulse 95   Temp 98 °F (36.7 °C)   Resp (!) 33   Ht 5' 9\" (1.753 m)   Wt 65 kg (143 lb 3.2 oz)   SpO2 91%   BMI 21.15 kg/m²       General:  distress, appears stated age. Head:  Normocephalic, without obvious abnormality, atraumatic. Eyes:  Conjunctivae/corneas clear.        Throat: Lips, mucosa, and tongue normal. Teeth and gums normal.   Neck: Supple, symmetrical.       Lungs:   Clear to auscultation bilaterally. Heart:  Regular rate and rhythm, S1, S2 normal.   Abdomen:   Soft, non-tender. Bowel sounds normal.   Extremities: Extremities normal, atraumatic, no cyanosis or edema. Pulses: 2+ and symmetric all extremities. Neurologic: Grossly nonfocal       Lab Results   Component Value Date/Time    Sodium 138 06/28/2021 03:17 AM    Potassium 3.6 06/28/2021 03:17 AM    Chloride 101 06/28/2021 03:17 AM    CO2 33 (H) 06/28/2021 03:17 AM    BUN 23 (H) 06/28/2021 03:17 AM    Creatinine 0.79 06/28/2021 03:17 AM    Glucose 116 (H) 06/28/2021 03:17 AM    Calcium 8.7 06/28/2021 03:17 AM    Magnesium 2.1 06/26/2021 04:18 AM    Lactic acid 1.4 06/25/2021 03:17 PM       Lab Results   Component Value Date/Time    WBC 3.9 (L) 06/28/2021 03:17 AM    HGB 10.2 (L) 06/28/2021 03:17 AM    PLATELET 873 (L) 66/74/0303 03:17 AM    .3 (H) 06/28/2021 03:17 AM       Lab Results   Component Value Date/Time    INR POC 1.1 11/03/2016 12:11 PM    Alk.  phosphatase 86 06/26/2021 04:18 AM    Protein, total 6.5 06/26/2021 04:18 AM    Albumin 2.8 (L) 06/26/2021 04:18 AM    Globulin 3.7 06/26/2021 04:18 AM       Lab Results   Component Value Date/Time    TSH 1.820 09/14/2020 12:46 PM       Lab Results   Component Value Date/Time    Troponin-I, Qt. <0.05 06/25/2021 01:16 PM        Lab Results   Component Value Date/Time    Culture result: NO GROWTH 3 DAYS 06/25/2021 03:17 PM       Lab Results   Component Value Date/Time    Color RED 09/20/2018 08:17 AM    Appearance CLOUDY (A) 09/20/2018 08:17 AM    Specific gravity 1.025 09/20/2018 08:17 AM    pH (UA) 7.0 09/20/2018 08:17 AM    Protein 100 (A) 09/20/2018 08:17 AM    Glucose NEGATIVE  09/20/2018 08:17 AM    Ketone TRACE (A) 09/20/2018 08:17 AM    Bilirubin NEGATIVE  09/20/2018 08:17 AM    Blood LARGE (A) 09/20/2018 08:17 AM    Urobilinogen 0.2 09/20/2018 08:17 AM    Nitrites NEGATIVE  09/20/2018 08:17 AM    Leukocyte Esterase TRACE (A) 09/20/2018 08:17 AM    WBC 5-10 09/20/2018 08:17 AM    RBC >100 (H) 09/20/2018 08:17 AM    Bacteria NEGATIVE  09/20/2018 08:17 AM       IMPRESSION:  ===========  -acute hypoxemic respiratory failure.  -abnormal CT Chest with bilateral GGO R > L => atypical pneumonia. Rule out covid-19 pneumonitis/ pneumonia.  -Left side malignant pleural effusion. -h/o malignant neoplasm of breast; On Abemaciclib (verzenio) > 1 year.  -Atrial fibrillation. PLAN:  =====  -O2 supplementation to keep sats above 92%. Patient is on %. Switch to HFNC.  -agree with abx. Continue with zosyn and levaquin.  -Check CRP. -Check PCR covid-19.  -Abemaciclib has been associated with acute pneumonitis. May try steroids if CRP high.  -agree with palliative care consult. Thank you for the consult.     Digna Qureshi MD

## 2021-06-28 NOTE — PROGRESS NOTES
Hospitalist Progress Note              Anthony Apodaca MD.                                                             Cell: [phone]                               NAME:  Ben Pace III  :  1932  MRN:  [id number]  Date of Service:  2021    Summary: 80 y.o. male who presented on 2021 with worsening shortness of breath. He has a history of malignant neoplasm of the breast.  In the ER, he was hypoxic with spo2 in the upper 70's. CXR performed showed diffuse Insterstitial and patchy airspace opacities     Subjectively  -Still SOB,remained on 6 l/min w/spo2 in the low 90s    Assessment/Plan:  Acute hypoxic respiratory failure   CTA perform showed no evidence of PE. Chronic large complex left pleural effuson, chronic mild mediastinal lymphadenopathy  On Zosyn 3.375 gm every 8hrs and Levaquin due to suspected superimposed bacterial Pneumonia  Wean off high flow oxygen if able  Strict pulse oximetry monitoring      Severe bilateral bacteria Pneumonia  CTA chest shows interval development of severe multilobar airspace disease suggestive of COVID 19 PNA  COVID 19 rapid test was not detected  Continue current abx  CXR with worsening pneumonia on the R>L  Continue  diuresis with lasix. Large left pleural effusion: This is due to malignant left pleural effusion in the setting of PNA  Last thoracentesis was on . Patient states last thoracentesis was extremely painful and would not like to have a repeat thoracentesis unless he is put to sleep  - Continue oxygen via NC  - Continue lasix 20 mg I.V BID  - Consider Pleurx catheter and discharge home on hospice    H/o malignant neoplasm of the breast  Requiring chemotherapy. He says he has stopped chemo this past Tuesday due to unwanted side effected  Will ask his oncology group for their opinion as to prognosis. goals of care    H/o PAF  He is off eliquis       Code status: dnr  DVT prophylaxsis:lovenox  Dispo:Tbd. Likely home with hospice. Consult Hospice           Review of Systems:  Pertinent items are noted in HPI. Objective:     VITALS:   Last 24hrs VS reviewed since prior progress note. Most recent are:  Visit Vitals  /64 (BP 1 Location: Right upper arm, BP Patient Position: At rest)   Pulse 95   Temp 97.9 °F (36.6 °C)   Resp (!) 35   Ht 5' 9\" (1.753 m)   Wt 65 kg (143 lb 3.2 oz)   SpO2 93%   BMI 21.15 kg/m²       Intake/Output Summary (Last 24 hours) at 6/28/2021 1037  Last data filed at 6/28/2021 0400  Gross per 24 hour   Intake    Output 850 ml   Net -850 ml        PHYSICAL EXAM:  General: No acute distress, cooperative, pleasant   EENT: EOMI. Anicteric sclerae. Oral mucous moist, oropharynx benign  Resp: Tacypneic. Reduce BS left lung compared to right. Mild accessory muscle use. Crepitation R>L  CV: Regular rhythm, normal rate, no murmurs, gallops, rubs  GI: Soft, non distended, non tender. normoactive bowel sounds, no hepatosplenomegaly Extremities: No edema, warm, 2+ pulses throughout  Neurologic: Moves all extremities. AAOx3, CN II-XII grossly intact  Psych: Good insight. Not anxious nor agitated. Skin: Good Turgor, no rashes or ulcers. No edema     Lab Data Personally Reviewed: (see below)     Medications list Personally Reviewed:  x YES  NO     _______________________________________________________________________  Care Plan discussed with:  Patient/Family and Nurse    Total NON critical care TIME:  30 minutes    Haven Plascencia MD     Procedures: see electronic medical records for all procedures/Xrays and details which were not copied into this note but were reviewed prior to creation of Plan.       LABS:  Recent Labs     06/28/21  0317 06/26/21  0418   WBC 3.9* 4.3   HGB 10.2* 10.1*   HCT 30.8* 30.3*   * 115*     Recent Labs     06/28/21  0317 06/26/21  0418 06/25/21  1316    136 135*   K 3.6 4.3 4.1    104 101   CO2 33* 26 29   BUN 23* 19 20 CREA 0.79 0.61* 0.99   * 117* 182*   CA 8.7 8.9 9.1   MG  --  2.1  --      Recent Labs     06/26/21  0418 06/25/21  1316   ALT 13 19   AP 86 103   TBILI 0.9 1.0   TP 6.5 7.5   ALB 2.8* 3.4*   GLOB 3.7 4.1*     No results for input(s): INR, PTP, APTT, INREXT, INREXT in the last 72 hours. No results for input(s): FE, TIBC, PSAT, FERR in the last 72 hours. No results found for: FOL, RBCF   No results for input(s): PH, PCO2, PO2 in the last 72 hours.   Recent Labs     06/25/21  1316   TROIQ <0.05     Lab Results   Component Value Date/Time    Cholesterol, total 147 09/14/2020 12:46 PM    HDL Cholesterol 58 09/14/2020 12:46 PM    LDL, calculated 69 09/14/2020 12:46 PM    Triglyceride 109 09/14/2020 12:46 PM     Lab Results   Component Value Date/Time    Glucose (POC) 98 05/18/2021 03:12 PM    Glucose (POC) 102 (H) 11/13/2020 01:05 PM     Lab Results   Component Value Date/Time    Color RED 09/20/2018 08:17 AM    Appearance CLOUDY (A) 09/20/2018 08:17 AM    Specific gravity 1.025 09/20/2018 08:17 AM    pH (UA) 7.0 09/20/2018 08:17 AM    Protein 100 (A) 09/20/2018 08:17 AM    Glucose NEGATIVE  09/20/2018 08:17 AM    Ketone TRACE (A) 09/20/2018 08:17 AM    Bilirubin NEGATIVE  09/20/2018 08:17 AM    Urobilinogen 0.2 09/20/2018 08:17 AM    Nitrites NEGATIVE  09/20/2018 08:17 AM    Leukocyte Esterase TRACE (A) 09/20/2018 08:17 AM    Epithelial cells FEW 09/20/2018 08:17 AM    Bacteria NEGATIVE  09/20/2018 08:17 AM    WBC 5-10 09/20/2018 08:17 AM    RBC >100 (H) 09/20/2018 08:17 AM

## 2021-06-28 NOTE — HOSPICE
[facility]  Good Help to Those in Need    Hospice Note  Patient Name: Aiden Newberry III  YOB: 1932   Age: 80 y.o. [facility]  Note:   MSW completed info session with Son who appeared overwhelmed and concerned for patient returning home with Pneumonia not being cleared up. MSW explained that Hospice Nurse can provide more insight to what the discharge would look like for patient if he still symptomatic. The patient currently lives alone, son lives in [facility] visiting, and will return home next week. The patient other son will come this week from [place] but theres other family who live a mile away from the patient. Son shared they would like hospice at home for patient but would need some time to prepare and rearrange the patients home. Son stated hes planning to hire caregivers ASAP and planning to meet with someone after consult. Son was provided [employer]. CM and MSW will need to follow up with patient Monday to discuss plans for discharge.     [facility]  [phone]

## 2021-06-28 NOTE — PROGRESS NOTES
Verbal bedside report given to Faby Lai oncoming nurse by Marisa Martinez. Diomedes ANNE off-going nurse. Report included current pt status and condition, recent results, hx of present illness, heart rate and rhythm, and respiratory status. 1100  Pt still having difficulty maintaining O2, moved to non-rebreather    1000  Pt still satting in mid 80s in 11 L O2, titrated to 15L, satting in low 90s    0900  Pt unable to maitain O2 sats on NC, called RT, switched to midflow 11 L    0800  Pt concerned about discharge situation[place]

## 2021-06-28 NOTE — PROGRESS NOTES
Reason for Admission:  Admitted from home with complaints of shortness of breath. History of breast cancer with pleural effusion. RUR Score:  14%-Low                   Plan for utilizing home health:    Referral for Hospice and seen by [address]. Awaiting their input. Will likely discharge home with Hospice. This CM met with patient, zohaib Miles and daughter in law. They would like to take patient home followed by Hospice. They will provide caregivers 24/day. Son Ivory Miles will be in the home with patient for extended amt of time. He resides in [facility]. They are aware that caregiver cost will be out of pocket and they can afford that coast. This CM spoke with Abbie Espinal, liaison from [address] [place] [address] 1788 and they can provide caregivers. Waiting to hear from hospice as to when they can admit at home. PCP: First and Last name:  Yamila Arana MD     Name of Practice:    Are you a current patient: Yes/No: YES  Approximate date of last visit: 6/21  Can you participate in a virtual visit with your PCP: NO                    Current Advanced Directive/Advance Care Plan: DNR  Healthcare Decision Maker:            Primary Decision Maker: Adriana Dan - [phone]    Secondary Decision Maker: Giacomo Estes - Daughter-in-Law - [phone]                  Transition of Care Plan:     Anticipate that he will likely discharge home followed by hospice. He will need S transport. Referral for 24/hr care givers sent to [facility]. Care Management Interventions  PCP Verified by CM:  Yes  Last Visit to PCP: 06/15/21  Palliative Care Criteria Met (RRAT>21 & CHF Dx)?: Yes  Palliative Consult Recommended?: Yes  Mode of Transport at Discharge: S  Current Support Network: Lives Alone, Family Lives Nearby  The Patient and/or Patient Representative was Provided with a Choice of Provider and Agrees with the Discharge Plan?:  Phong Cost son)  [address] Provided?: No  Discharge Location  Discharge Placement: Home with hospice    Sterling Karimi RN CRM  Ext [phone]

## 2021-06-28 NOTE — PROGRESS NOTES
Problem: Pressure Injury - Risk of  Goal: *Prevention of pressure injury  Description: Document David Scale and appropriate interventions in the flowsheet. Outcome: Progressing Towards Goal  Note: Pressure Injury Interventions:       Moisture Interventions: Check for incontinence Q2 hours and as needed    Activity Interventions: Pressure redistribution bed/mattress(bed type)    Mobility Interventions: HOB 30 degrees or less    Nutrition Interventions: Offer support with meals,snacks and hydration, Document food/fluid/supplement intake    Friction and Shear Interventions: HOB 30 degrees or less                Problem: Patient Education: Go to Patient Education Activity  Goal: Patient/Family Education  Outcome: Progressing Towards Goal     Problem: Falls - Risk of  Goal: *Absence of Falls  Description: Document Aj Fall Risk and appropriate interventions in the flowsheet.   Outcome: Progressing Towards Goal  Note: Fall Risk Interventions:  Mobility Interventions: Communicate number of staff needed for ambulation/transfer         Medication Interventions: Evaluate medications/consider consulting pharmacy    Elimination Interventions: Call light in reach              Problem: Patient Education: Go to Patient Education Activity  Goal: Patient/Family Education  Outcome: Progressing Towards Goal

## 2021-06-29 PROBLEM — C50.919 METASTATIC BREAST CANCER (HCC): Status: ACTIVE | Noted: 2021-01-01

## 2021-06-29 NOTE — HOSPICE
Hospice Liaison Note:    Chart reviewed for updates in plan of care. Pt is now on High Flow oxygen. PCR COVID returned Negative. Plan: Admit pt GIP LOC at [facility]. Once titrated down to mid-flow, and stable, plan to transfer him to [facility] for GIP LOC. Verbal CTI for the hospice diagnosis of Metastatic Breast Cancer from Dr. Km Mitchell. Symptoms: Respiratory distress, anxiety and nausea. Please call Hospice team to offer support for patient, family or staff.     Thank you for your coordination with the hospice plan of care      Germain Feliz RN, [place] [address] Nurse Liaison  [phone] [place]  [phone] Office

## 2021-06-29 NOTE — PROGRESS NOTES
0900  Pt still using non-rebreather, causing some distress as he can't drink his coffee. Moved to [facility], but he has a hard time maintaining sats because he can't seem to remember to breath through nose.

## 2021-06-29 NOTE — HOSPICE
[facility] to Those in Need  [phone]    Social Work Admission Note  Patient Name: Matt Richard III  YOB: 1932  Age: 80 y.o. Date of Visit: 06/29/21  Facility of Care: [facility]   Patient Room: [address] [place] [address],[address] Floor, [facility] Attending: Elvie Valente MD  Hospice Diagnosis: Metastatic breast cancer ([address] [C50.919]    Level of Care:    [x]  GIP    []  Respite   []  Routine    Consents/NCD Documentation:     Consents Reviewed: Yes     Person Reviewed/Signed with: Stacey Altamirano     Right to NCD Reviewed: Yes, will revisit when pt returns home. NCD Requested: No, but would like it once pt returns home. Admission Nurse/Intake Notified NCD was requested:N/A    Planned Start of Care Date: 6/29/2021     Hospice Witness Representative:Zahra CORRIGANW, MSG    NARRATIVE   This LCSW met with pt, who's COVID test came back negative. Pt is on high flow and is SOB upon speaking. Pt was on contact precautions as he was PUI for COVID. Pt is an 79 y/o CM with a hospcie diagnosis of metastatic breast cancer. Pt has comorbid anxiety. Pt was admitted to [facility] ED from MD office due to SOB. Pt is  and has a blended family of 4 sons, 2 biological sons Dominic Rodriguez ( lives in [facility] [place])  and Jarrett Swartz ( lives in [facility] [place] [facility])  and 2 step sons Stacey Altamirano ( [facility]), who lives near pt and runs the family farm, and another son. Pt is a retired  and ran [employer]. LCSW provided emotional support for pt in coping with his EOL due to metastatic breast ca. Pt reports feelings of anxiety. LCSW provided support for pt in coping with feelings of anxiety. LCSW and pt discussed what is important to him at this time. Pt reports he does want to linger, LCSW and pt discussed. LCSW provided reassurance for pt of his comfort and support for him and the family through this journey. Pt is goal is to return home after the 4th weekend. Pt lives [place].  Family will be setting up 24 hour caregivers in the home. Pt expressed concern he does not want to return home until caregivers are arranged. Family has made Bruno Rao determination, they will call me back with the name. Low risk for bereavement for step son Sung Martin and son Candi Kingston. LCSW will continue to assess and monitor pt and family needs. Plan to transfer pt to [facility] tomorrow 2021. [facility] to transport with tentative  time for 3:15 pm.      ADVANCE CARE PLANNING    Code Status: DDNR, completed upon admission  Durable DNR: _X Yes  _ No  No flowsheet data found. Relationship Status:  []  Single     []        []      []  Domestic Partner     [x]  /  []  Common Law  []    []  Unknown    If in a relationship, name of partner/spouse:  Duration of relationship:    Restorationist: Faith     Home: Family will confirm Bruno Rao name   Resources Provided: Caregiver, C/ Gavin 23 Work Initial Assessment     Gender:  male    Race/Ethnicity: (jorge all that apply)  []  [place] or [place] Native  []    []  Black or   []   or   []  Native [facility] [address] or [facility]  [x]  White  []  Unknown      Service:    []  Yes   []  No       [x]  Unknown  Appropriate for Pinning Ceremony:   []  Yes      []  No  Is patient using VA benefits?    []  Yes      []  No     Primary Language: English   []   Needed  []   utilized during visit    Ability to express thoughts/needs/feelings  []  Expressed thoughts/feelings/needs without difficulty  [x]  Requires extra time and cuing  []  Speech limited single words  []  Uses only gestures (eye, blinking eye or head movement/pointing)  []  Unable to express thoughts/feelings/needs (speech unintelligible or inappropriate)  []  Unresponsive  Notes:      Mental Status:  [x]  Alert-oriented to:     [x]  Person     [x]  Place     [x]  Time  []  Comatose-responds to:    []   Verbal stimuli    []  Tactile stimuli    []  Painful stimuli  []  Forgetful  []  Disoriented/Confused  []  Lethargic  []  Agitated  [x]  Other (specify): Very SOB, anxious     Notes:      Patients description of Illness/Current Health Status:    []  Patient unable to discuss  []  Patient unwilling to discuss  [x]  (Specify)  Able to discuss      Knowledge/Understanding of Disease Process  Patient:    [x]  Demonstrates knowledge/understanding of disease process   [x]  Demonstrates knowledge/understanding of treatment plan   [x]  Demonstrates knowledge/understanding of prognosis   [x]  Demonstrates acceptance of prognosis   [x]  Demonstrates knowledge/understanding of resuscitation status   []  Other (specify)  Caregiver:   [x]  Demonstrates knowledge/understanding of disease process   [x]  Demonstrates knowledge/understanding of treatment plan   [x]  Demonstrates knowledge/understanding of prognosis   [x]  Demonstrates acceptance of prognosis   [x]  Demonstrates knowledge/understanding of resuscitation status   []  Other (specify)  Notes:      Patients living arrangement/care setting:  Use the PRIOR COLUMN when the PATIENTS current health status necessitated a change in his/her primary residence.     Prior Current Response              [x]             []    Patients own home/residence              []             []    Home of family member/friend              []             []    Boarding home              []             []    Assisted living facility/FCI center              []             []    Hospital/Acute care facility              []             []    Skilled nursing facility              []             []    Long term care facility/Nursing home              []             []    Hospice in Patient      Primary Caregiver:  []  No Primary Caregiver  Name of Primary Caregiver: Lorena Prabhakar   Relationship or Primary Caregiver:    []  Spouse/Significant other       []  Natural Child        [x]  Step child       []  Sibling   [] Parent   []  Friend/Neighbor   []  Community/Denominational Volunteer   []  Paid help   []  Other (specify):___________  Notes:       Family members/Significant others:  Name:Rudy Guerrero   Relationship: step son   Phone Chatterjee Rosibel  Actively involved in care? [x]  Yes  []  No    Name:Bria Guerrero  Relationship: step daughter suleman   Phone [phone]  Actively involved in care? [x]  Yes  []  No    Name:Robbin Grant   Relationship: son   Phone Number: [phone]  Actively involved in care?   [x]  Yes  []  No    Social support systems: (select ONE best description)  [x]  Excellent social support system which includes three or more family members or friends  []  Good social support system which includes two or less members or friends  []  [address] support which includes one family member or friend  []  Poor social support; no family members or friends; basically ALONE  Notes:      Emotional Status: (jorge all that apply)    Patient Caregiver Response                 []                [x]    Mood/Affect stable and appropriate                   [x]                []    Angry                 []                []    Anxious                 []                []    Apprehensive                 []                []    Avoidant                 []                []    Clinging                 []                []    Depressed                 []                []    Distraught                 []                []    Elated                 []                []    Euphoric                 []                []    Fearful                 []                []    Flat Affect                 []                []    Helpless                 []                []    Hostile                 []                []    Impulsive                 []                []    Irritable                 []                []    Labile                 []                []    Manic                 []                []    Restlessness                 [] []    Sad                 []                []    Suspicious                 []                []    Tearful                 []                []    Withdrawn     Notes:     Coping Skills (strengths/weakness):    Patient: Coping Skills (strength/weakness): Well supported by family, realistic, does not want to linger, very SOB, weak. Family/caregiver (strength/weakness): Well supported, realistic, accepting, Gian's mother  15 yrs ago, trying to get pt home.       [place] of care (jorge all that apply):     [x]  No burden evident   []  Family must administer medications   []  Illness causing financial strain   []  Family/Support feels overwhelmed   []  Family/Support sleep disturbed with patients care   []  Patients care causes extra physical stress  of death  []  Illness causes changes in family lifestyle  []  Illness impacting family/support employment  []  Family experiencing increased time demands  []  Patients behavior endangers family  []  Denial of patients illness  []  Concern over outcome of illness/fear  []  Patients behavior embarrassing to family   Notes:      Risk Factors: (jorge all that apply):    [x]  No burden evident   []  Alcohol abuse  []  Financial resources inadequate to meet basic needs (food/house/etc)  []  Financial resources inadequate to meet health care needs (supplies/equipment/medications)  []  Food/nutrition resources inadequate  []  Home environment unsafe/inadequate for home care  []  Homicidal risk  []  Lives alone or without concerned relatives  []  Multiple medications/complex schedule  []  Physical limitations increase likelihood of falls  []  Plan of care/treatments complicated  []  Substance use/abuse  []  Suicidal risk  []  Visual impairment threatens safety/ability to perform self-care  []  Other (specify):     Abuse/Neglect (actual/potential risks):  [x]  No signs of abuse/neglect  []  History of abuse/neglect                 []  Physical          [] Sexual  []  History of domestic violence  []  Lacks adequate physical care  []  Lacks emotional nurturing/support  []  Lacks appropriate stimulation/cognitive experiences  []  Left alone inappropriately  []  Lacks necessary supervision  []  Inadequate or delayed medical care  []  Unsafe environment (i.e guns/drug use/history of violence in the home/etc.)  []  Bruising or other physical signs of injury present  []  Other (specify):  Notes:   []  Refer to child/adult protective services      Current Sources of Stress (in Addition to Current Illness):   [x]  None reported  []  Bills/Debt    []  Career/Job change    []   (short term)    []   (long term)    []  Death of a child (recent)    []  Death of a parent (recent)   []  Death of a spouse (recent)   []  Employment status changed   []  Family discord    []  Financial loss/Inadequate inther (specify):come  []  Job loss  []  Legal issues unresolved  []  Lifestyle change  []  Marital discord  []  Marriage within the last year  []  Paperwork (insurance/legal/etc) overwhelming  []  Separation/Divorce  []  Other (specify):  Notes:      Current [employer] Being Utilized     1. Interventions/Plan of Care     1. Assess social and emotional factors related to coping with end of life issues  2. Community resource planning/referral   3. Relocation to different care setting if/when symptoms stabilize  4. Discharge Planning     1. Transfer to [facility] 6/30/2021 then home if stable.    MSW Assessment Completed by: Brittany Alvarez  06/29/21    Time In: 3: 00 pm        Time Out: 4:15 pm

## 2021-06-29 NOTE — HOSPICE
Hospice Liaison Note:    Chart reviewed for updates in plan of care. Plan: Review hospice plan of care with patient/family this am. Most likely will admit into inpatient hospice services. Will review with Dr. Roberto Parks. Please call Hospice team to offer support for patient, family or staff. Thank you for your coordination with the hospice plan of care    10:15: Phone call with pt son, Dominic Rodriguez. Reviewed patient meeting GIP LOC and plan to admit into hospice services. Riddhi Hart is in agreement. Discussed that patient's Lawanda Speedy, will need to sign consents and can be done through [employer].  Riddhi Hart is to contact Sarah Ang and will then contact Hospice Team.    Sydnee Llamas RN, [place] [address] Nurse Liaison  [phone] Mobile  [phone] Office

## 2021-06-29 NOTE — HOSPICE
[facility] Help to Those in Need  [phone]    Patient Name: Elda Nava III  YOB: 1932  Age: 80 y.o. [facility] Note:      Consents signed for hospice GIP admission. Report to follow. Thank you for the opportunity to be of service to Mr. Cris Storm and his family.       Zahra Zambrano [facility], [facility]  [phone]

## 2021-06-29 NOTE — ROUTINE PROCESS
Bedside, Verbal and Written shift change report given to Faby Lai  (oncoming nurse) by Yolanda Herring RN  (offgoing nurse). Report included the following information SBAR, Kardex, ED Summary, Procedure Summary, Intake/Output, MAR, Accordion, Recent Results, Med Rec Status, Cardiac Rhythm A-FIB, Alarm Parameters , Pre Procedure Checklist, Procedure Verification, Quality Measures and Dual Neuro Assessment. 2000: Pt is very anxious asking for his PRN ativan. Informed pt that it is scheduled every 6 hours PRN and not time as of yet. Working with pt on some breathing techniques to attempt to help him calm down. Pt is in bed resting with call bell in reach. 2220: Pt given PRN ativan states that he is happy he is now able to get the medication because it helps him relax.

## 2021-06-29 NOTE — PROGRESS NOTES
Transition Plan of Care  RUR 15%-Low  Disposition-plan to admit today to [facility] here. Spoke with Hospice liaison and they are presently trying to get POA to sign admission paperwork. Spoke with son Deon Hughes [phone] to update on progress. Awaiting PCR results of Covid. Plan to admit to [facility] hospice and if necessary will transition to the [facility]. This CM will remain in volved until he is admitted to hospice.   Shilpi Raza RN CRM  Ext [phone]

## 2021-06-29 NOTE — DISCHARGE SUMMARY
Discharge Summary       PATIENT ID: Elizabeth Daigle  MRN: [id number]   YOB: 1932    DATE OF ADMISSION: 6/25/2021 12:57 PM    DATE OF DISCHARGE: 06/29/21     PRIMARY CARE PROVIDER: Guyann Kussmaul, MD     ATTENDING PHYSICIAN: Cherylene Majors, MD    DISCHARGING PROVIDER: Cherylene Majors, MD    To contact this individual call [phone] and ask the  to page. If unavailable ask to be transferred the Adult Hospitalist Department. CONSULTATIONS: IP CONSULT TO PULMONOLOGY    PROCEDURES/SURGERIES: * No surgery found *    ADMITTING DIAGNOSES & HOSPITAL COURSE:   80 y.o. male who presented on 6/25/2021 with worsening shortness of breath. He has a history of malignant neoplasm of the breast.  In the ER, he was hypoxic with spo2 in the upper 70's. CXR performed showed diffuse Insterstitial and patchy airspace opacities patient was admitted and managed for loss    Acute hypoxic respiratory failure due to severe pneumonia. Severe bilateral bacterial pneumonia. Malignant large left pleural effusion  Malignant neoplasm of the breast with lung metastasis  Paroxysmal atrial fibrillation      CTA perform showed no evidence of PE. Chronic large complex left pleural effuson, chronic mild mediastinal lymphadenopathy. Rapid Covid test and SARS  2 PCR were negative. He is being diuresed. Patient was being treated with Zosyn 3.375 gm every 8hrs and Levaquin due to suspected superimposed bacterial Pneumonia. He remains dependent on high flow oxygen. Despite this treatment, patient did not make any progress. Palliative and hospice team were consulted. Patient was alert oriented x3 and family decided for hospice care. He was admitted to inpatient hospice.                 PENDING TEST RESULTS:   At the time of discharge the following test results are still pending: None    FOLLOW UP APPOINTMENTS:    Follow-up Information     Follow up With Specialties Details Why [facility]  FOR [facility] SERVICES  [address] [facility] [address]  [phone]    Pratima Noonan MD Internal Medicine   [address] [place] [address]  [phone]                   DISCHARGE MEDICATIONS:  Discharge Medication List as of 6/29/2021  3:18 PM            NOTIFY YOUR PHYSICIAN FOR ANY OF THE FOLLOWING:   Fever over 101 degrees for 24 hours. Chest pain, shortness of breath, fever, chills, nausea, vomiting, diarrhea, change in mentation, falling, weakness, bleeding. Severe pain or pain not relieved by medications. Or, any other signs or symptoms that you may have questions about. DISPOSITION: Inpatient hospice    Home With:   OT  PT  HH  RN       Long term SNF/Inpatient Rehab    Independent/assisted living    Hospice    Other:       PATIENT CONDITION AT DISCHARGE:     Functional status   x Poor     Deconditioned     Independent      Cognition   x  Lucid     Forgetful     Dementia      Catheters/lines (plus indication)    Smith     PICC     PEG     None      Code status   x  Full code     DNR      PHYSICAL EXAMINATION AT DISCHARGE:  General:           Alert and oriented x3, on oxygen via high flow nasal cannula, in distress    HEENT:           Atraumatic, anicteric sclerae, pink conjunctivae                          No oral ulcers, mucosa moist, throat clear, dentition fair  Neck:               Supple, symmetrical  Lungs: Tachypneic, on oxygen via high flow nasal cannula, in distress. Chest wall:      No tenderness  No Accessory muscle use. Heart:              Regular  rhythm,  No  murmur   No edema  Abdomen:        Soft, non-tender. Not distended. Bowel sounds normal  Extremities:     No cyanosis. No clubbing,                            Skin turgor normal, Capillary refill normal  Skin:                Not pale. Not Jaundiced  No rashes   Psych:             Not anxious or agitated.   Neurologic:      Alert, moves all extremities, answers questions appropriately and responds to commands       CHRONIC MEDICAL DIAGNOSES:  Problem List as of 6/29/2021 Date Reviewed: 5/4/2021        Codes Class Noted - Resolved    Metastatic breast cancer ([facility] ICD-10-CM: C50.919  ICD-9-CM: 174.9  6/29/2021 - Present        Acute respiratory failure ([facility] ICD-10-CM: J96.00  ICD-9-CM: 518.81  6/25/2021 - Present        Pneumonia ICD-10-CM: J18.9  ICD-9-CM: 150  6/25/2021 - Present        Atherosclerosis of aorta ([facility] ICD-10-CM: I70.0  ICD-9-CM: 440.0  7/31/2019 - Present        Bladder cancer ([facility] ICD-10-CM: C67.9  ICD-9-CM: 188.9  Unknown - Present        Glucose intolerance (impaired glucose tolerance) ICD-10-CM: R73.02  ICD-9-CM: 790.22  10/31/2013 - Present        Breast cancer, male [facility]) ICD-10-CM: C50.929  ICD-9-CM: 175.9  7/5/2012 - Present        Prostate cancer ([facility] ICD-10-CM: C61  ICD-9-CM: 295  7/26/2011 - Present        Colon polyps ICD-10-CM: K63.5  ICD-9-CM: 211.3  7/26/2011 - Present        Typhoid fever ICD-10-CM: A01.00  ICD-9-CM: 002.0  7/26/2011 - Present        Breast cancer ([facility] ICD-10-CM: C50.919  ICD-9-CM: 174.9  7/26/2011 - Present        Atrial fibrillation (H[facility]) ICD-10-CM: I48.91  ICD-9-CM: 427.31  Unknown - Present              Greater than 30 minutes were spent with the patient on counseling and coordination of care    Signed:    Yariel Guzmán MD  6/29/2021  6:34 PM

## 2021-06-29 NOTE — PROGRESS NOTES
Verbal bedside report given to [address] [facility] on[facility] nurse by GILMAR Hercules RN off-going nurse. Report included current pt status and condition, recent results, hx of present illness, heart rate and rhythm, and respiratory status.

## 2021-06-29 NOTE — HSPC IDG SOCIAL WORKER NOTES
Pt is an 79 y/o CM with a hospcie diagnosis of metastatic breast cancer. Pt has comorbid anxiety. Pt was admitted to [facility] from MD office due to SOB. Pt is  and has a blended family of 4 sons, 2 biological sons Mario Chilel ( lives in [facility])  and Kev Velasco ( lives in [place])  and 2 step sons Doug Kim ( [employer]), who lives near pt and runs the family farm, and another son. Problem; Need for emotional support. Intervention: LCSW will provide emotional support for pt and family in coping with his EOL due to metastatic breast ca. Plan: Continue to provide support for pt and family. Problem: Pt has anxiety. Intervention;LCSW will provide support for pt in coping with feelings of anxiety. Plan: LCSW will continue to provide suport to help reduce pt anxiety. Problem; Community resource needs:    Intervention: LCSW will assist with community resources including caregiver resources. Plan: LCSW will continue to assess and provide needed community resources. Tay  on Pablito Hancock to serve. Low risk for bereavement for step son Conor Chavez and son Betsy Cruise. LCSW will continue to assess and monitor pt and family needs. Kane Sorto

## 2021-06-29 NOTE — PROGRESS NOTES
Problem: Pressure Injury - Risk of  Goal: *Prevention of pressure injury  Description: Document David Scale and appropriate interventions in the flowsheet. Outcome: Not Progressing Towards Goal  Note: Pressure Injury Interventions:  Sensory Interventions: Assess changes in LOC    Moisture Interventions: Apply protective barrier, creams and emollients    Activity Interventions: Pressure redistribution bed/mattress(bed type)    Mobility Interventions: HOB 30 degrees or less    Nutrition Interventions: Document food/fluid/supplement intake    Friction and Shear Interventions: Apply protective barrier, creams and emollients, Lift sheet                Problem: Patient Education: Go to Patient Education Activity  Goal: Patient/Family Education  Outcome: Progressing Towards Goal     Problem: Falls - Risk of  Goal: *Absence of Falls  Description: Document Aj Fall Risk and appropriate interventions in the flowsheet.   Outcome: Progressing Towards Goal  Note: Fall Risk Interventions:  Mobility Interventions: Bed/chair exit alarm, Patient to call before getting OOB    Mentation Interventions: Bed/chair exit alarm    Medication Interventions: Patient to call before getting OOB    Elimination Interventions: Bed/chair exit alarm              Problem: Breathing Pattern - Ineffective  Goal: *Absence of hypoxia  Outcome: Progressing Towards Goal     Problem: Anxiety  Goal: *Alleviation of anxiety  Outcome: Progressing Towards Goal     Problem: Pain  Goal: *Control of acute pain  Outcome: Progressing Towards Goal

## 2021-06-29 NOTE — PROGRESS NOTES
Physician Progress Note      PATIENT:               Deandra Kuo  CSN #:                  [id number]  :                       1932  ADMIT DATE:       2021 12:57 PM  [address] DATE:        2021 3:18 PM  RESPONDING  PROVIDER #:        Alison Yepez MD          QUERY TEXT:    Patient admitted with PNA and Acute Resp Failure. Per Wound Care, pt noted to also have Pressure Ulcer. If possible, please document in progress notes and discharge summary the location, present on admission status and stage of the pressure ulcer: The medical record reflects the following:  Risk Factors: decreased mobility due to illness, weakness  Clinical Indicators: pt noted to have 8 x 7 cm non-blanching purple/ red DTI of sacrum, POA. Treatment: Venelex TID, specialty bed, turn and reposition q 2 hrs, moisture mgmt    Stage 1:  Non-blanchable erythema of intact skin  Stage 2:  Abrasion, Blister, Partial-thickness skin loss, with exposed dermis  Stage 3:  Full-thickness skin loss with damage or necrosis of subcutaneous tissue  Stage 4:  Full-thickness skin & soft tissue loss through to underlying muscle, tendon or bone  Unstageable: Obscured full-thickness skin & tissue loss    Thank you, if you have any questions please e-mail me at  [email or web address]  [phone]  Options provided:  -- Deep tissue injury pressure ulcer of Sacrum present on admission  -- Other - I will add my own diagnosis  -- Disagree - Not applicable / Not valid  -- Disagree - Clinically unable to determine / Unknown  -- Refer to Clinical Documentation Reviewer    PROVIDER RESPONSE TEXT:    This patient has a deep tissue injury pressure ulcer of the Sacrum which was present on admission.     Query created by: Lizzy Duval on 2021 1:35 PM      Electronically signed by:  Alison Yepez MD 2021 6:30 PM

## 2021-06-29 NOTE — HOSPICE
18:20: Bedside assessment after pt received IV Morphine 2mg and IV Lorazepam 0.5mg. PT resting eyes closed. He aroused when I called his name. He states that he feels better. Asked if he felt dose was too strong, or not strong enough, he replied, ' Oh No! Don't change a thing. \" HR remains 101, Respiratorons are 26. Reviewed dosing with Dr. Yesenia Walker. Plan to continue with current Dose overnight. 17:30: Bedside assessment with patient and his son, Gordo Julian. Updated family on hospice plan of care. Family and pt seem agreeable to changes to medications with plan to titrate from high flow oxygen on Wednesday am. Educated pt will need to be on Mid-Flow or lower oxygen dose for safe transport to hospice house. Updated bedside nurse, Ajay Fernandes RN, and requested first IV dose of medications. Plan to return bedside to evaluate effectiveness of Morphine IV with Lorazepam IV. [facility]  [phone]    Inpatient Nursing Admission   Patient Name: Aiden Newberry III  YOB: 1932  Age: 80 y.o. Date of Hospice Admission: 6/29/2021  Hospice Attending Elected by Patient: Aaron Galloway MD  Primary Care Physician: Pratima Noonan MD  Admitting RN: Kirstie Daugherty RN, [facility]  : Emily Jacob LCSW     Level of Care (GIP/Routine/Respite): [facility]  Facility of Care: [facility]  Patient Room: 419/01     HOSPICE SUMMARY   ER Visits/ Hospitalizations in past year: 1  Hospice Diagnosis: Metastatic breast cancer ([facility] [C50.919]  Onset Date of Hospice Diagnosis:   Onset Date of Hospice Diagnosis: 2005    HISTORY OF PRESENT ILLNESS:  The patient is an 15-year-old gentleman with a history of metastatic breast cancer, who is seen after admission to [facility] with worsening shortness of breath. This gentleman is cared for by Dr. Deena Truong, and has had multiple lines of therapy for his metastatic breast cancer.   He has more recently been on abemaciclib, which he took for approximately 3 weeks but did not tolerate it, and came off of that medication earlier this month. He has been experiencing increasing shortness of breath and came to the emergency room with this chief complaint on Friday, 06/25, and CT angiogram performed in the emergency room showed patchy bilateral pulmonary infiltrates, concerning for pneumonia. He had no evidence of pulmonary embolus and was admitted for further evaluation and therapy. His rapid test for COVID was negative x1. He is being ruled out nonetheless. PAST MEDICAL HISTORY:  Significant for metastatic breast cancer, originally diagnosed in 04/2016. He has been on several different therapies for this including hormonal treatment. He has a history of bladder cancer, muscle invasive, diagnosed 2017, treated with six intravesical instillations of BCG. He has had multiple skin cancers, has a history of pleural effusion requiring thoracentesis in 2018. He has a history of GERD; recurrent pleural effusion in 08/2020, requiring thoracentesis on several occasions, in 2020 as well as 2021. He has atrial fibrillation, history of typhoid fever. PAST SURGICAL HISTORY:  Includes left mastectomy and chest wall radiation therapy in 2005, colonoscopy with polypectomy, tonsillectomy, cystoscopy, prostate surgery. Summary of Disease Progression Leading to Hospice Diagnosis:   CHIEF COMPLAINTS    Dyspnea. HISTORY OF PRESENT ILLNESS   This is an 80year-old pleasant gentleman came to the ED via private vehicle from the doctor's office where he presented with acute dyspnea. He states that shortness of breath hit him around noon. He says he has been having dry cough for a while now. He denied fever but has chilliness. No chest pain, palpitation, dizziness. Denied diarrhea, abdominal pain or diarrhea. He denied dysuria, urgency or frequency.   Patient is fully vaccinated against Covid  On arrival in the ED he was found to be hypoxic with SPO2 of 78% on room air. Blood work and chest x-ray were performed. Chest x-ray showed diffuse interstitial and patchy airspace opacities, stable left lateral pleural effusion. CBC with normal WBC with neutrophil pleocytosis, lymphopenia. Sodium 135. Lactic acid 1.4. Troponin less than 0.05.  proBNP 3595. Rapid Covid test negative. 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 PMH/PSH:        Co-Morbidities:   Patient Active Problem List   Diagnosis Code    Atrial fibrillation (HCC) I48.91    [facility] C61    Colon polyps K63.5    Typhoid fever A01.00    Breast cancer, male ([facility] C50.929    Glucose intolerance (impaired glucose tolerance) R73.02    Bladder cancer ([facility]) C67.9    Breast cancer ([facility] C50.919    Atherosclerosis of aorta ([facility]) I70.0    Acute respiratory failure (HCC) J96.00    Pneumonia J18.9    Metastatic breast cancer ([facility] C50.919     Diagnoses RELATED to the terminal prognosis:   -acute hypoxemic respiratory failure.  -abnormal CT Chest with bilateral GGO R > L => atypical pneumonia. Rule out covid-19 pneumonitis/ pneumonia.  -Left side malignant pleural effusion. -h/o malignant neoplasm of breast; On Abemaciclib (verzenio) > 1 year. Other Diagnoses: -Atrial fibrillation. Rationale for a prognosis of life expectancy of 6 months or less if the disease follows its normal course (Disease Specific History): Madelin Arana III is a 80 y.o. who was admitted to [facility]. The patient's principle diagnosis of Metastatic Breast Cancer has resulted in Acute Respiratory Distress/failure  Functionally, the patient's Palliative Performance Scale has declined over a period of 2 months and is estimated at 40-30.  Objective information that support this patients limited prognosis includes:        PET/CT   INDICATION:   Acute hypoxia and dyspnea started around noon today. Cancer  patient. Needs to rule out PE   COMPARISON:  PET/CT May 18, 2021  TECHNIQUE:  Following the uneventful intravenous administration of 100 cc Isovue  558, thin helical axial images were obtained through the chest. Postprocessing  was performed. 3D image postprocessing was performed. CT dose reduction was achieved through the use of a standardized protocol  tailored for this examination and automatic exposure control for dose  modulation. FINDINGS:  There is chronic mediastinal and hilar lymphadenopathy. There is chronic  cardiomegaly. There is no pericardial effusion. No filling defect is seen within the pulmonary arterial system to suggest  pulmonary embolus. The aorta is normal in caliber. There is interval development of severe multilobar airspace disease, mostly  groundglass opacity, with areas of consolidation, throughout both lungs, right  worse than left. There is a chronic complex left pleural effusion, similar in  volume to the prior study, allowing for differences in technique. Limited evaluation of the upper abdomen demonstrates a large chronic right renal  cyst measuring 7.7 cm. The osseous structures are unremarkable. IMPRESSION  1. No evidence of pulmonary embolus. 2.  Interval development of severe multilobar airspace disease. Appearance  suggests COVID 19 pneumonia. 3. Chronic large complex left pleural effusion. 4. Chronic mild mediastinal lymphadenopathy. 5. Chronic cardia megaly. EXAM: XR CHEST PORT  INDICATION: f/u severe pneumonia  COMPARISON: 6/25/2021  FINDINGS: A portable AP radiograph of the chest was obtained at 0827 hours. The  patient is on a cardiac monitor. Bilateral pneumonia right greater than left  persist with an increase in the pneumonia in the right mid and upper lung zone. Large left pleural effusion with associated atelectasis at the left lung base is  unchanged. IMPRESSION  1.  Worsening pneumonia in the right mid and upper lung zone. Results for Karol Terrazas (MRN [id number]) as of 6/29/2021 10:40   Ref. Range 6/28/2021 03:17   WBC Latest Ref Range: 4.1 - 11.1 K/uL 3.9 (L)   NRBC Latest Ref Range: 0  WBC 0.0   RBC Latest Ref Range: 4.10 - 5.70 M/uL 2.54 (L)   HGB Latest Ref Range: 12.1 - 17.0 g/dL 10.2 (L)   HCT Latest Ref Range: 36.6 - 50.3 % 30.8 (L)   MCV Latest Ref Range: 80.0 - 99.0 .3 (H)   MCH Latest Ref Range: 26.0 - 34.0 PG 40.2 (H)   MCHC Latest Ref Range: 30.0 - 36.5 g/dL 33.1   RDW Latest Ref Range: 11.5 - 14.5 % 13.0   PLATELET Latest Ref Range: 150 - 400 K/uL 113 (L)   MPV Latest Ref Range: 8.9 - 12.9 FL 10.1   NEUTROPHILS Latest Ref Range: 32 - 75 % 83 (H)   LYMPHOCYTES Latest Ref Range: 12 - 49 % 3 (L)   MONOCYTES Latest Ref Range: 5 - 13 % 10   EOSINOPHILS Latest Ref Range: 0 - 7 % 2   BASOPHILS Latest Ref Range: 0 - 1 % 1   IMMATURE GRANULOCYTES Latest Ref Range: 0.0 - 0.5 % 1 (H)   DF Latest Units:   SMEAR SCANNED   ABSOLUTE NRBC Latest Ref Range: 0.00 - 0.01 K/uL 0.00   ABS. NEUTROPHILS Latest Ref Range: 1.8 - 8.0 K/UL 3.3   ABS. IMM. GRANS. Latest Ref Range: 0.00 - 0.04 K/UL 0.0   ABS. LYMPHOCYTES Latest Ref Range: 0.8 - 3.5 K/UL 0.1 (L)   ABS. MONOCYTES Latest Ref Range: 0.0 - 1.0 K/UL 0.4   ABS. EOSINOPHILS Latest Ref Range: 0.0 - 0.4 K/UL 0.1   ABS. BASOPHILS Latest Ref Range: 0.0 - 0.1 K/UL 0.0   Results for Karol Terrazas (MRN [id number]) as of 6/29/2021 10:40   Ref. Range 6/26/2021 04:18   Albumin Latest Ref Range: 3.5 - 5.0 g/dL 2.8 (L)       The patient/family chose comfort measures with the support of Hospice.     Patient meets for GIP LOC as evidenced by respiratory distress with oxygen requirements at [facility]    Prognosis estimated based on 06/29/21 clinical assessment is:     [x] Few to Many Days     ASSESSMENT    Patient self-reports:  []  Yes    [x] No    SYMPTOMS: Respiratory Distress, Anxiety, Nausea, pain    SIGNS/PHYSICAL FINDINGS:   Visit Vitals  Ht 5' 9\" (1.753 m)   Wt 65 kg (143 lb 4.8 oz)   SpO2 93%   BMI 21.16 kg/m²     Pt is short of breath with conversation. He is alert and oriented, at times has been slightly confused, He is clear at this time. Pt puffing with breaths. Skin is warm, dry. Abdomen is distended. KARNOFSKY: 40-30    FAST for all dementia:      Learning Assessment:  Patient  Is patient willing/able to learn? YES  What is the highest level of education completed? Learning preference (written material, demonstration, visual)? Learning barriers (ESOL, [place], poor vision)? Weakness, poor perfusion=changes to mental status. Caregiver  Is caregiver willing to learn care for patient? Yes with hired caregivers  What is the highest level of education completed? Learning preference (written material, demonstration, visual)? Learning barriers (ESOL, [place], poor vision)? CLINICAL INFORMATION     Wt Readings from Last 3 Encounters:   06/25/21 65 kg (143 lb 3.2 oz)   05/18/21 68 kg (150 lb)   05/17/21 68 kg (150 lb)     Ht Readings from Last 3 Encounters:   06/25/21 5' 9\" (1.753 m)   05/18/21 5' 8\" (1.727 m)   05/17/21 5' 9\" (1.753 m)     There is no height or weight on file to calculate BMI. There were no vitals taken for this visit. LAB VALUES  No results found for this visit on 06/29/21 (from the past 12 hour(s)). No results found for this visit on 06/29/21 (from the past 6 hour(s)). Lab Results   Component Value Date/Time    Protein, total 6.5 06/26/2021 04:18 AM    Albumin 2.8 (L) 06/26/2021 04:18 AM       Currently this patient has:  [x] Supplemental O2 [x] Peripheral IV  [] PICC    [] PORT   [] Smith Catheter [] NG Tube   [] PEG Tube [] Ostomy    [] AICD: Has ICD been deactivated? [] Yes [] No:______    PLAN     Plan of Care:    1. GIP level of care needed for symptoms necessitating frequent skilled nursing assessment and administration of parenteral  medications.  Needs monitoring for need to titrate sx mgt regimen for optimization of comfort. 2. Pt is at high risk of rapid decline and death due to terminal disease process  3. Provide education and support to unit staff caring for hospice patient and family regarding end of life care and Hospice plan of care. Provide staff with direct contact information to reach hospice team [phone]   4. Provide support and frequent rounds for patient comfort and safety ongoing  5. Provide  support ongoing, continue to discuss discharge plan if patient becomes payal and does not require acute nursing care interventions for [facility] level of care  6. Provide  and bereavement support ongoing  7. Plan to titrate oxygen from High Flow to Mid Flow [place]. Once successfully titrated to mid-flow, plan to move patient from 4th floor to [address] [place] [address] to transfer patient to [facility] if stable on Wednesday to continue with GIP LOC under Hospice services. 8. Continue with Oral scheduled medications if patient continues to tolerate. 9. Schedule Morphine 2mg IV every 4 hours with Lorazepam 0.5mg IV every 4 hours. 10. Add PRN doses of IV Morphine 2mg every 30 minutes and IV lorazepam 0.5mg every 3 minutes PRN Compazine and IV Zofran available for nausea. Pt states he feels constipated. Scheduled Senna 2 tabs BID with suppository PRN  11 . Maintain skin integrity as tolerated for hospice patient, turning and repositioning for comfort, and specialty mattress if appropriate. Continue with wound care recommendations:  Heels offloaded with pillows. Heels intact with blanching pink erythema. 1. POA Sacrum, Deep Tissue Pressure Injury: 8 x 7 x 0 cm  Non blanching purple and red erythema. 2.  POA Penis, on shaft just below the glans. There is a partial thickness wound with pink wound bed. Wound, Pressure Prevention & Skin Care Recommendations:    1. Minimize layers of linen/pads under patient to optimize support surface. 2.  Turn/reposition approximately every 2 hours and offload heels. 3.  Manage moisture/ Keep skin folds clean and dry. 4.  Sacrum, buttocks and heels:  Venelex TID. 5.  Penis:  Bacitracin TID    12. Peripheral IV line care as per hospital policy for infection prevention  13. Plan to transfer patient to [facility] if stable on Wednesday to continue with GIP LOC under Hospice services. Hospice Team Frequency Orders:  Skilled Nurse - Daily x 7 days /every other day x 7 days  with 5 PRN visits for symptom control. MSW  1 visit for initial assessment/evaluation for family support and need for volunteer services. Nupur Dukes  1 visit for initial assessment/evaluation for spiritual support. ADVANCE CARE PLANNING (Complete in ACP Flow Sheet)   Code Status: DNR  Durable DNR: [x]  Yes  []  No  Code Status Discussed/Confirmed: YES  Preference for Other Life Sustaining Treatment Discussed/Confirmed: YES  Hospitalization Preference: YES (ex. Patient would like to receive end of life care in the hospital, in a facility, at home etc.)  No flowsheet data found.  Service: [x] Yes  []  No      [] Unknown  Appropriate for Pinning Ceremony:  [x] Yes     [] No  Contact made to Hospice Team 2021. Moravian: Restorationism   Home: [facility]    DISCHARGE PLANNING   1. Discharge Plan: If patient stabilizes, and if his PCR COVID results are negative, family would like for patient to be transferred to [facility]. Once stable at [facility], family plans to have pt return to his own home with private 24 hour caregivers. 2. Patient/Family teaching: Pt and son's, Reece Ladmelissa, Cydney Song  3. Response to patient/family teaching: Family state agreement with hospice plan of care        SOCIAL/EMOTIONAL/SPIRITUAL NEEDS     Spiritual Issues Identified: Hospice Chaplain available ongoing    Psych/ Social/ Emotional Issues Identified: Hospice social worker support available ongoing.  See notes from [address], LCSW    Caregiver Support:  [x] Provided information on End of Life Care   [x] Material Provided: Gone From My Sight or Thomas Colette Hughes contacted, discharge to hospice order received  Dr. Dashawn Barron contacted, agrees to serve as attending provider for hospice and provided verbal certification of terminal illness with life expectancy of 6 months or less. Orders for hospice admission, medications and plan of treatment received. Medication reconciliation completed.   MEDS: See medication list below  DME: Per hospital  Supplies: Per hospital  IDT communication to include MD, SN, SW, CH and support team    ALLERGIES AND MEDICATIONS     Allergies: No Known Allergies  Current Facility-Administered Medications   Medication Dose Route Frequency    balsam peru-castor oiL (VENELEX) ointment   Topical TID    saline peripheral flush soln 5 mL  5 mL InterCATHeter PRN    polyethylene glycol (MIRALAX) packet 17 g  17 g Oral NOW    [START ON 6/30/2021] polyethylene glycol (MIRALAX) packet 17 g  17 g Oral DAILY PRN    prochlorperazine (COMPAZINE) injection 10 mg  10 mg IntraVENous Q4H PRN    LORazepam (ATIVAN) injection 1 mg  1 mg IntraVENous Q15MIN PRN    acetaminophen (TYLENOL) tablet 650 mg  650 mg Oral Q4H PRN    glycopyrrolate (ROBINUL) injection 0.2 mg  0.2 mg IntraVENous Q4H PRN    senna-docusate (PERICOLACE) 8.6-50 mg per tablet 2 Tablet  2 Tablet Oral BID PRN    bisacodyL (DULCOLAX) suppository 10 mg  10 mg Rectal DAILY PRN    bacitracin 500 unit/gram packet 1 Packet  1 Packet Topical BID     Nelson Campa RN, [address] Nurse Liaison  [phone] Mobile  [phone] Office  Available on [employer]

## 2021-06-29 NOTE — CONSULTS
[address]    Name:  Amara Pittman  MR#:  [id number]  :  1932  ACCOUNT #:  [de-identified]  DATE OF SERVICE:  2021    HISTORY OF PRESENT ILLNESS:  The patient is an 75-year-old gentleman with a history of metastatic breast cancer, who is seen after admission to [facility] with worsening shortness of breath. This gentleman is cared for by Dr. Joann Sheriff, and has had multiple lines of therapy for his metastatic breast cancer. He has more recently been on abemaciclib, which he took for approximately 3 weeks but did not tolerate it, and came off of that medication earlier this month. He has been experiencing increasing shortness of breath and came to the emergency room with this chief complaint on Friday, , and CT angiogram performed in the emergency room showed patchy bilateral pulmonary infiltrates, concerning for pneumonia. He had no evidence of pulmonary embolus and was admitted for further evaluation and therapy. His rapid test for COVID was negative x1. He is being ruled out nonetheless. PAST MEDICAL HISTORY:  Significant for metastatic breast cancer, originally diagnosed in 2016. He has been on several different therapies for this including hormonal treatment. He has a history of bladder cancer, muscle invasive, diagnosed , treated with six intravesical instillations of BCG. He has had multiple skin cancers, has a history of pleural effusion requiring thoracentesis in . He has a history of GERD; recurrent pleural effusion in 2020, requiring thoracentesis on several occasions, in  as well as . He has atrial fibrillation, history of typhoid fever. PAST SURGICAL HISTORY:  Includes left mastectomy and chest wall radiation therapy in , colonoscopy with polypectomy, tonsillectomy, cystoscopy, prostate surgery.     SOCIAL HISTORY:  He is a past smoker, past drinker, who formally owned a construction business. ALLERGIES:  NO KNOWN DRUG ALLERGIES. FAMILY HISTORY:  Father had prostate cancer at the time of his death. REVIEW OF SYSTEMS:  As noted above, otherwise noncontributory. PHYSICAL EXAMINATION:  GENERAL:  He is a pleasant, frail, elderly gentleman, in no apparent distress. VITAL SIGNS:  His temp is 98, pulse 95, /64, respirations 33, O2 sat 91% on face mask nonrebreather. HEENT:  EOMI. Nonicteric sclerae. NECK:  Supple. He is aerating with mild difficulty bilaterally. CARDIAC:  Irregular rate and rhythm. ABDOMEN:  Soft. EXTREMITIES:  No edema. NEUROPSYCHIATRIC:  Grossly intact. SKIN:  Multiple surgical scars from skin cancer surgery are noted. LABORATORY DATA:  CBC revealed a hemoglobin of 10.2, white count 3.9, platelet count 079. BUN and creatinine are both normal.  Liver enzymes are normal.  Albumin 3.8. CT angiogram identified bilateral pulmonary infiltrates. No pleural effusion. He does have a chronic large complex left pleural effusion. He has mediastinal lymphadenopathy. He has an enlarged heart. IMPRESSION:    1. History of metastatic male breast cancer, treated in the past with hormonal therapy and most recently with abemaciclib, which he did not tolerate. The chronic left pleural effusion is thought to be malignant. He is not eligible for chemotherapy due to his poor performance status at present. 2.  Severe multilobar airspace disease. This has the appearance of COVID pneumonia per Radiology. He is being ruled out for COVID at present. He is being treated with empiric intravenous antibiotics to treat bacterial possibilities, and is being seen by Pulmonary Medicine. We will defer further workup and treatment for this to Pulmonary Medicine. The patient does desire DNR status. We will follow with you on behalf of [place] [id number].       Ella Verde MD      [id number]  D:  06/28/2021 18:01  T:  06/28/2021 19:48  JOB #: [id number]

## 2021-06-29 NOTE — PROGRESS NOTES
Name: Karli Pinto: Ginger Tsai   : 1932 Admit Date: 2021   Phone: [phone]  Room: [facility]   PCP: Ángel Handley MD  MRN: [id number]   Date: 2021  Code: DNR        HPI:      Borderline saturations on midflow. I asked to switch to high flow. CRP 26      3:49 PM       History was obtained from patient. I was asked by Meredith Lora MD to see Roman Zazueta III in consultation for a chief complaint of shortness of breath. History of Present Illness: 80year old male with past medical history as given below who presented to [facility] with increased shortness of breath and cough. 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 In the ED his sats was70's. On Abemaciclib (verzenio) for more than one year for breast cancer with mets to lung. Patient has not been feeling well for the past 10 days. He has cough mostly dry for the past 10 days along with chills. He denies documented fever. CXR performed showed diffuse Insterstitial and patchy airspace opacities. CT Chest with left side pleural effusion and right side GGO. Data -  Wbc 3.9 (N 83%). D-dimer 16.54 - cta chest - no pe. Cr 0.79  NT pro-BNP 1696. PCT 0.94  COVID-19 rapid antigen - negative. Old PET/Ct reviewed - left side pleural effusion/ was tapped in 2021 / malignant by cytology . Past Medical History:   Diagnosis Date    Atrial fibrillation ([address]     Bladder cancer ([address]     Breast cancer ([address] 2011    Breast cancer, male ([address] 2012    Colon polyps 2011    Glucose intolerance (impaired glucose tolerance) 10/31/2013    Prostate cancer ([address] 2011    Typhoid fever 2011       Past Surgical History:   Procedure Laterality Date    HX CATARACT REMOVAL      HX MASTECTOMY      HX POLYPECTOMY      HX PROSTATECTOMY      HX TONSILLECTOMY      IR THORACENTESIS CATH W IMAGE  2019       No family history on file.     Social History Tobacco Use    Smoking status: Former Smoker     Packs/day: 2.00    Smokeless tobacco: Never Used   Substance Use Topics    Alcohol use: Yes     Alcohol/week: 5.8 standard drinks     Types: 7 Standard drinks or equivalent per week     Comment: A drink at night       No Known Allergies    Current Facility-Administered Medications   Medication Dose Route Frequency    LORazepam (ATIVAN) tablet 0.5 mg  0.5 mg Oral Q6H PRN    bacitracin 500 unit/gram packet 1 Packet  1 Packet Topical TID    balsam peru-castor oiL (VENELEX) ointment   Topical TID    furosemide (LASIX) injection 20 mg  20 mg IntraVENous BID    levoFLOXacin (LEVAQUIN) 750 mg in D5W IVPB  750 mg IntraVENous Q24H    sodium chloride (NS) flush 5-40 mL  5-40 mL IntraVENous Q8H    sodium chloride (NS) flush 5-40 mL  5-40 mL IntraVENous PRN    acetaminophen (TYLENOL) tablet 650 mg  650 mg Oral Q6H PRN    Or    acetaminophen (TYLENOL) suppository 650 mg  650 mg Rectal Q6H PRN    polyethylene glycol (MIRALAX) packet 17 g  17 g Oral DAILY PRN    ondansetron (ZOFRAN ODT) tablet 4 mg  4 mg Oral Q8H PRN    Or    ondansetron (ZOFRAN) injection 4 mg  4 mg IntraVENous Q6H PRN    enoxaparin (LOVENOX) injection 40 mg  40 mg SubCUTAneous DAILY    digoxin (LANOXIN) tablet 0.25 mg  0.25 mg Oral DAILY    aspirin delayed-release tablet 81 mg  81 mg Oral DAILY    NIFEdipine ER (PROCARDIA XL) tablet 30 mg  30 mg Oral DAILY    piperacillin-tazobactam (ZOSYN) 3.375 g in 0.9% sodium chloride (MBP/ADV) 100 mL MBP  3.375 g IntraVENous Q8H         REVIEW OF SYSTEMS   Negative except as stated in the HPI. Physical Exam:   Visit Vitals  /82 (BP 1 Location: Right upper arm, BP Patient Position: At rest)   Pulse 94   Temp 97.8 °F (36.6 °C)   Resp (!) 41   Ht 5' 9\" (1.753 m)   Wt 65 kg (143 lb 3.2 oz)   SpO2 94%   BMI 21.15 kg/m²       General:  distress, appears stated age. Head:  Normocephalic, without obvious abnormality, atraumatic.    Eyes: Conjunctivae/corneas clear. Throat: Lips, mucosa, and tongue normal. Teeth and gums normal.   Neck: Supple, symmetrical.       Lungs:   Diminished bs       Heart:  Regular rate and rhythm, S1, S2 normal.   Abdomen:   Soft, non-tender. Bowel sounds normal.   Extremities: Extremities normal, atraumatic, no cyanosis or edema. Pulses: 2+ and symmetric all extremities. Neurologic: Grossly nonfocal       Lab Results   Component Value Date/Time    Sodium 138 06/28/2021 03:17 AM    Potassium 3.6 06/28/2021 03:17 AM    Chloride 101 06/28/2021 03:17 AM    CO2 33 (H) 06/28/2021 03:17 AM    BUN 23 (H) 06/28/2021 03:17 AM    Creatinine 0.79 06/28/2021 03:17 AM    Glucose 116 (H) 06/28/2021 03:17 AM    Calcium 8.7 06/28/2021 03:17 AM    Magnesium 2.1 06/26/2021 04:18 AM    Lactic acid 1.4 06/25/2021 03:17 PM       Lab Results   Component Value Date/Time    WBC 3.9 (L) 06/28/2021 03:17 AM    HGB 10.2 (L) 06/28/2021 03:17 AM    PLATELET 003 (L) 68/12/8854 03:17 AM    .3 (H) 06/28/2021 03:17 AM       Lab Results   Component Value Date/Time    INR POC 1.1 11/03/2016 12:11 PM    Alk.  phosphatase 86 06/26/2021 04:18 AM    Protein, total 6.5 06/26/2021 04:18 AM    Albumin 2.8 (L) 06/26/2021 04:18 AM    Globulin 3.7 06/26/2021 04:18 AM       Lab Results   Component Value Date/Time    C-Reactive protein 26.40 (H) 06/29/2021 11:43 AM    TSH 1.820 09/14/2020 12:46 PM       Lab Results   Component Value Date/Time    Troponin-I, Qt. <0.05 06/25/2021 01:16 PM        Lab Results   Component Value Date/Time    Culture result: NO GROWTH 4 DAYS 06/25/2021 03:17 PM       Lab Results   Component Value Date/Time    Color RED 09/20/2018 08:17 AM    Appearance CLOUDY (A) 09/20/2018 08:17 AM    Specific gravity 1.025 09/20/2018 08:17 AM    pH (UA) 7.0 09/20/2018 08:17 AM    Protein 100 (A) 09/20/2018 08:17 AM    Glucose NEGATIVE  09/20/2018 08:17 AM    Ketone TRACE (A) 09/20/2018 08:17 AM    Bilirubin NEGATIVE  09/20/2018 08:17 AM    Blood LARGE (A) 09/20/2018 08:17 AM    Urobilinogen 0.2 09/20/2018 08:17 AM    Nitrites NEGATIVE  09/20/2018 08:17 AM    Leukocyte Esterase TRACE (A) 09/20/2018 08:17 AM    WBC 5-10 09/20/2018 08:17 AM    RBC >100 (H) 09/20/2018 08:17 AM    Bacteria NEGATIVE  09/20/2018 08:17 AM       IMPRESSION:  ===========  -acute hypoxemic respiratory failure.  -abnormal CT Chest with bilateral GGO R > L => atypical pneumonia. Rule out covid-19 pneumonitis/ pneumonia.  -Left side malignant pleural effusion. -h/o malignant neoplasm of breast; On Abemaciclib (verzenio) > 1 year.  -Atrial fibrillation. PLAN:  =====  -O2 supplementation to keep sats above 92%. Patient is on %. Switch to HFNC.   -on zosyn  -on diuretics  -lovenox    -CRP; elevated start solu medrol   -PCR covid-19; negative   -Abemaciclib has been associated with acute pneumonitis.   -agree with palliative care consult.  -Patient is critically ill and at high risk for further decline, mechanical ventilation, and ICU admission due to acute resp failure; CC time spent excluding procedures is > 35 minutes    Bravo Constantino PA-C

## 2021-06-30 NOTE — PROGRESS NOTES
TRANSFER - OUT REPORT:    Verbal report given to Tamela on Alisson Rotunda III  being transferred to [address] (unit) for routine progression of care       Report consisted of patients Situation, Background, Assessment and   Recommendations(SBAR). Information from the following report(s) SBAR and Kardex was reviewed with the receiving nurse. Lines:   Peripheral IV 06/30/21 Anterior;Right Forearm (Active)        Opportunity for questions and clarification was provided.               1400  Pt resting    0900  Family present, pt drinking his morning coffee

## 2021-06-30 NOTE — PROGRESS NOTES
This LCSW, Sydnee Llamas RN and Dr. Roberto Parks visited the room of pt who appears to have declined overnight. Pt is having increased lethargy and continues to have SOB. Transfer to [facility] has been cancelled. Pt will remain at [facility] [place]. LCSW met with son Dominic Rodriguez. LCSW provided space for son to express feelings of sadness and supportive counseling re pt anticipated EOL. LCSW provided space for son to talk about being with pt for the past 17 months as son has been in the [facility] ( lives in [facility]) with pt during [facility]. LCSW and son discussed time with pt, and his tx. LCSW provided affirmation of meaningful conversation son had with pt last night, where pt said he is \"ready\". Son is reaching out to brother in [place], who is expected to arrive Monday. Butch Shirts came during my visit. LCSW shared GFMS, and normalized pt sleeping more, responding less. LCSW and son discussed Darryn Patterson for family. Son will notify this LCSW if needed. LCSW will continue to assess and monitor pt and family needs.

## 2021-06-30 NOTE — H&P
[address] [place] [address]   [facility] Help to Those in Need  [phone]    Patient Name: Sylvia Alfredo III  YOB: 1932    Date of Provider Hospice Visit: 6/29/2021    Level of Care:   [x] General Inpatient (GIP)    [] Routine   [] Respite    Current Location of Care:  [] [facility] [] [facility] [] [facility] [] [address] [place] [address] [] [facility], patient referred from:  [] [facility] [] [facility] [] [facility] [] [address] [place] [address] [] Home [] Other:     Date of Original Hospice Admission: 6/29/2021  Hospice Medical Director at time of admission: Ana Modi MD    Principle Hospice Diagnosis: Metastatic breast cancer  Diagnoses RELATED to the terminal prognosis:   Other Diagnoses:   Afig, GERD, recurrent pleural effusion, history of bladder cancer     HOSPICE SUMMARY     Sylvia Alfredo III is a 80y.o. year old who was admitted to [address] [place] [address]. He has metastatic breast cancer for which he has undergone multiple lines of therapy. He reportedly most recently received abemaciclib but was unable to tolerate it. He has had progressing shortness of breath for which he was admitted to [facility] on 6/25/21. He was admitted and treated for pneumonia. 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 His COVID-19 tests returned negative. He has not improve significantly despite treatment and required HFNC. He is no longer a candidate for chemotherapy. The patient and family chose to pursue comfort measures with the support of Hospice. He is being admitted to inpatient hospice for further management of acute symptoms as his HFNC is being weaned. At time of my exam on 6/29, he has PPS 20-30% with possible prognosis in the range of hours to days as his HFNC is weaned. Objective information:   6/25/2021 CTA Chest:  IMPRESSION  1. No evidence of pulmonary embolus. 2.  Interval development of severe multilobar airspace disease. Appearance  suggests COVID 19 pneumonia.   3. Chronic large complex left pleural effusion. 4. Chronic mild mediastinal lymphadenopathy. 5. Chronic cardia megaly. HOSPICE ASSESSMENT     Active Symptoms and Issues:  -Labored breathing and tachypnea  -Anxiety/agitation/restlessness  -Generalized pain  -Decreased responsiveness  -Hospice care     PLAN     1. GIP level of care needed for symptoms necessitating frequent skilled nursing assessment and administration of parenteral medications. Needs monitoring for need to titrate sx mgt regimen for optimization of comfort. 2. Pt is at high risk of rapid decline and death due to terminal disease process as HFNC is weaned. 3. For pain and labored breathing we have scheduled morphine 2 mg IV every 4 hours routinely and prn pain, air hunger. 4. For anxiety and labored breathing we have scheduled ativan 0.5 mg IV every 4 hours routinely and prn anxiety, agitation, restlessness. 5. We will have glycopyrrolate 0.2 mg IV available prn to help prevent formation of new secretions. Recommend recovery positioning for drainage of current secretions. Gentle anterior suction of secretions okay (i.e. suction around lips/teeth). Recommend avoiding deep suction to prevent irritation, pain, bleeding. 6. We will have bisacodyl available prn constipation. 7. We will have toradol available prn fever. Recommend utilizing cool washcloth on forehead and recommend against using ice packs. 8. D/c all previous medications which are not contributing to comfort at this time. Can continue cardiac medications if he is able to take po.  9. Bites/sips for comfort okay if patient is alert and able to participate. 8.  and SW to support family needs. 11. Disposition: planned for possible transfer to the hospice house  12.  Hospice plan of care discussed with hospice team, patient    Prognosis estimated based on today's clinical assessment is:   [x] Hours to Days    [] Days to Weeks    [] Other:     GOALS OF CARE     Patient/Medical POA stated Goal of Care: optimize patient comfort    [] I have reviewed and/or updated ACP information in the Advance Care Planning Navigator. This information is available in the [address] link in the patient's chart header. Primary Medical Decision Maker:   Primary Decision Maker: Daniel Gill - Son - [phone]    Secondary Decision Maker: Lior Mabry - Daughter-in-Law - [phone]    Resuscitation Status: DNR  If DNR is there a Durable DNR on file? : [] Yes [x] No (If no, complete Durable DNR)--completed at time of hospice admission    HISTORY     History obtained from: chart, hospice team, patient    CHIEF COMPLAINT: anxiety \"can't keep my air\"  The patient is:   [x] Verbal  [] Nonverbal  [] Unresponsive    HPI/SUBJECTIVE:    6/29: patient awake and able to speak in short sentences. He is on HFNC. Appears to have labored breathing and anxiety, which he endorses. Endorses restlessness. States that he can't keep his air. His goal is for comfort care with hospice. He is amenable to receiving comfort medications. States the ativan he received last night helped him to sleep. When asked about pain, he states he has general pain all over. He thinks he had a small BM today. He has had some sips/small bites. REVIEW OF SYSTEMS     The following systems were: [] reviewed  [x] unable to be reviewed in full due to dyspnea.  Pertinent positives as per hpi    Positive ROS include bold items:  Constitutional: fatigue, weakness, in pain, short of breath  Ears/nose/mouth/throat: increased airway secretions  Respiratory:shortness of breath, wheezing  Gastrointestinal:poor appetite, nausea, vomiting, abdominal pain, constipation, diarrhea  Musculoskeletal:pain, deformities, swelling legs  Neurologic:confusion, hallucinations, weakness  Psychiatric:anxiety, feeling depressed, poor sleep  Endocrine: increased thirst, increased hunger    Adult Non-Verbal Pain Assessment Score: 3    Face  [] 0   No particular expression or smile  [x] 1 Occasional grimace, tearing, frowning, wrinkled forehead  [] 2   Frequent grimace, tearing, frowning, wrinkled forehead    Activity (movement)  [] 0   Lying quietly, normal position  [x] 1   Seeking attention through movement or slow, cautious movement  [] 2   Restless, excessive activity and/or withdrawal reflexes    Guarding  [x] 0   Lying quietly, no positioning of hands over areas of body  [] 1   Splinting areas of the body, tense  [] 2   Rigid, stiff    Physiology (vital signs)  [] 0   Stable vital signs  [] 1   Change in any of the following: SBP > 20mm Hg; HR > 20/minute  [] 2   Change in any of the following: SBP > 30mm Hg; HR > 25/minute    Respiratory  [] 0   Baseline RR/SpO2, compliant with ventilator  [x] 1   RR > 10 above baseline, or 5% drop SpO2, mild asynchrony with ventilator  [] 2   RR > 20 above baseline, or 10% drop SpO2, asynchrony with ventilator     FUNCTIONAL ASSESSMENT     Palliative Performance Scale (PPS): 20-30       PSYCHOSOCIAL/SPIRITUAL ASSESSMENT     Active Problems:    Metastatic breast cancer ([facility] (6/29/2021)      Past Medical History:   Diagnosis Date    Atrial fibrillation (HCC)     Bladder cancer ([facility]     Breast cancer ([facility] 07/26/2011    Breast cancer, male ([facility] 07/05/2012    Colon polyps 7/26/2011    Glucose intolerance (impaired glucose tolerance) 10/31/2013    Prostate cancer ([facility] 07/26/2011    Typhoid fever 7/26/2011      Past Surgical History:   Procedure Laterality Date    HX CATARACT REMOVAL      HX MASTECTOMY      HX POLYPECTOMY      HX PROSTATECTOMY      HX TONSILLECTOMY      IR THORACENTESIS CATH W IMAGE  11/8/2019      Social History     Tobacco Use    Smoking status: Former Smoker     Packs/day: 2.00    Smokeless tobacco: Never Used   Substance Use Topics    Alcohol use: Yes     Alcohol/week: 5.8 standard drinks     Types: 7 Standard drinks or equivalent per week     Comment: A drink at night     No family history on file.    No Known Allergies Current Facility-Administered Medications   Medication Dose Route Frequency    albuterol (PROVENTIL VENTOLIN) nebulizer solution 2.5 mg  2.5 mg Nebulization Q4H PRN    balsam peru-castor oiL (VENELEX) ointment   Topical TID    saline peripheral flush soln 5 mL  5 mL InterCATHeter PRN    LORazepam (ATIVAN) injection 1 mg  1 mg IntraVENous Q15MIN PRN    acetaminophen (TYLENOL) tablet 650 mg  650 mg Oral Q4H PRN    glycopyrrolate (ROBINUL) injection 0.2 mg  0.2 mg IntraVENous Q4H PRN    senna-docusate (PERICOLACE) 8.6-50 mg per tablet 2 Tablet  2 Tablet Oral BID PRN    bisacodyL (DULCOLAX) suppository 10 mg  10 mg Rectal DAILY PRN    bacitracin 500 unit/gram packet 1 Packet  1 Packet Topical BID    furosemide (LASIX) tablet 20 mg  20 mg Oral BID    NIFEdipine ER (PROCARDIA XL) tablet 30 mg  30 mg Oral DAILY    prochlorperazine (COMPAZINE) with saline injection 5 mg  5 mg IntraVENous Q4H PRN    digoxin (LANOXIN) tablet 0.25 mg  0.25 mg Oral DAILY    morphine injection 2 mg  2 mg IntraVENous Q4H    LORazepam (ATIVAN) injection 0.5 mg  0.5 mg IntraVENous Q4H    LORazepam (ATIVAN) injection 0.5 mg  0.5 mg IntraVENous Q30MIN PRN    morphine injection 2 mg  2 mg IntraVENous Q30MIN PRN        PHYSICAL EXAM     Wt Readings from Last 3 Encounters:   06/29/21 143 lb 4.8 oz (65 kg)   06/25/21 143 lb 3.2 oz (65 kg)   05/18/21 150 lb (68 kg)       Visit Vitals  /76   Pulse 89   Temp 97.5 °F (36.4 °C)   Resp 20   Ht 5' 9\" (1.753 m)   Wt 143 lb 4.8 oz (65 kg)   SpO2 96%   BMI 21.16 kg/m²       Supplemental O2  [x] Yes  [] NO  Last bowel movement: today per patient    Currently this patient has:  [] Peripheral IV [] PICC  [] PORT [] ICD    [] Smith Catheter [] NG Tube   [] PEG Tube    [] Rectal Tube [] Drain  [] Other:     Constitutional: lying abed, elderly, frail, appears uncomfortable  Eyes: lids normal, no drainage  ENMT: mmm, no exudate, nares normal  Cardiovascular: tachycardia, radial pulses palpable, no LE edema  Respiratory: rate 20s-30s with appearance of accessory muscle use and labored breathing  Gastrointestinal: soft, NT  Musculoskeletal: no gross deformity or TTP  Skin: warm, dry, no mottling  Neurologic: he is alert and able to respond to questions appropriately, he is able to move extremities  Psychiatric: he demonstrates anxiety and some restless at this time      Pertinent Lab and or Imaging Tests:  Lab Results   Component Value Date/Time    Sodium 138 06/28/2021 03:17 AM    Potassium 3.6 06/28/2021 03:17 AM    Chloride 101 06/28/2021 03:17 AM    CO2 33 (H) 06/28/2021 03:17 AM    Anion gap 4 (L) 06/28/2021 03:17 AM    Glucose 116 (H) 06/28/2021 03:17 AM    BUN 23 (H) 06/28/2021 03:17 AM    Creatinine 0.79 06/28/2021 03:17 AM    BUN/Creatinine ratio 29 (H) 06/28/2021 03:17 AM    GFR est AA >60 06/28/2021 03:17 AM    GFR est non-AA >60 06/28/2021 03:17 AM    Calcium 8.7 06/28/2021 03:17 AM     Lab Results   Component Value Date/Time    Protein, total 6.5 06/26/2021 04:18 AM    Albumin 2.8 (L) 06/26/2021 04:18 AM           Trina Burton MD  [facility]

## 2021-06-30 NOTE — PROGRESS NOTES
2250: pt began to desat into the low 80. RT was called and they came to increase pts oxygen. Pt is now satin 97% on 50L 95% hiflow. Will continue to monitor. 0730: Bedside and Verbal shift change report given to Tonja RN (oncoming nurse) by Rossy Prado RN (offgoing nurse). Report included the following information SBAR, Kardex, ED Summary, Intake/Output, MAR, Recent Results and Cardiac Rhythm Afib.

## 2021-06-30 NOTE — PROGRESS NOTES
Plan to comfort care  Patient plan to transfer hospice house today with midflow oxygen. Weaning O2 down from HFNC 50 slowly upto midflow so they can transfer to hospice care. 12:51pm --- per doctor from hospice, make the patient comfort and changed from HFNC 30 L, 100% to nasal canula 6 L.

## 2021-06-30 NOTE — PROGRESS NOTES
[facility]   [facility] Help to Those in Need  [phone]    Patient Name: Sylvia Alfredo III  YOB: 1932    Date of Provider Hospice Visit: 06/30/21    Level of Care:   [x] General Inpatient (GIP)    [] Routine   [] Respite    Current Location of Care:  [] [facility] [] [facility] [] [facility] [] [address] [] [facility], patient referred from:  [] [facility] [] [facility] [] [facility] [] [address] [] Home [] Other:     Date of Original Hospice Admission: 6/29/2021  Hospice Medical Director at time of admission: Ana Modi MD    Principle Hospice Diagnosis: Metastatic breast cancer  Diagnoses RELATED to the terminal prognosis:   Other Diagnoses:   Afig, GERD, recurrent pleural effusion, history of bladder cancer     HOSPICE SUMMARY     Sylvia Alfredo III is a 80y.o. year old who was admitted to [facility]. He has metastatic breast cancer for which he has undergone multiple lines of therapy. He reportedly most recently received abemaciclib but was unable to tolerate it. He has had progressing shortness of breath for which he was admitted to [facility] on 6/25/21. He was admitted and treated for pneumonia. 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 His COVID-19 tests returned negative. He has not improve significantly despite treatment and required HFNC. He is no longer a candidate for chemotherapy. The patient and family chose to pursue comfort measures with the support of Hospice. He is being admitted to inpatient hospice for further management of acute symptoms as his HFNC is being weaned. At time of my exam on 6/29, he has PPS 20-30% with possible prognosis in the range of hours to days as his HFNC is weaned. Objective information:   6/25/2021 CTA Chest:  IMPRESSION  1. No evidence of pulmonary embolus. 2.  Interval development of severe multilobar airspace disease. Appearance  suggests COVID 19 pneumonia.   3. Chronic large complex left pleural effusion. 4. Chronic mild mediastinal lymphadenopathy. 5. Chronic cardia megaly. HOSPICE ASSESSMENT     Active Symptoms and Issues:  -Labored breathing and tachypnea  -Anxiety/agitation/restlessness  -Generalized pain  -Decreased responsiveness  -Hospice care     PLAN     1. GIP level of care needed for symptoms necessitating frequent skilled nursing assessment and administration of parenteral medications. Needs monitoring for need to titrate sx mgt regimen for optimization of comfort. 2. Pt is at high risk of rapid decline and death due to terminal disease process as HFNC is weaned. For unclear reasons his HFNC was actually titrated upward overnight to 50 L when his saturations decreased. Today I personally discussed patient's goal for comfort-focused care with the plan to wean the HFNC to NC today with the RT team.  3. For pain and labored breathing we will continue morphine 2 mg IV every 4 hours routinely and prn pain, air hunger. 4. For anxiety and labored breathing we will titrate ativan to 1 mg IV every 4 hours routinely and prn anxiety, agitation, restlessness. 5. We will have glycopyrrolate 0.2 mg IV available prn to help prevent formation of new secretions. Recommend recovery positioning for drainage of current secretions. Gentle anterior suction of secretions okay (i.e. suction around lips/teeth). Recommend avoiding deep suction to prevent irritation, pain, bleeding. 6. We will have bisacodyl available prn constipation. 7. We will have toradol available prn fever. Recommend utilizing cool washcloth on forehead and recommend against using ice packs. 8.  and SW to support family needs. 9. Disposition: we will not transfer to the hospice house today given patient's respiratory status. anticipate that pt will decline and die in the coming hours-days without stabilizing enough for care in the home setting  10.  Hospice plan of care discussed with hospice team, patient and his son, bedside nurse, respiratory therapy, including counseling family regarding my assessment and estimated prognosis, s/s of EOL, recommended medication mgt for present sxs, recommendations regarding need for GIP LOC and recommendations for remaining at [facility] (son also had asked to stay at [facility] rather than put his father through a transfer to [facility]). Total times spent 35 minutes with over half time spent in counseling and coordination. Prognosis estimated based on today's clinical assessment is:   [x] Hours to Days    [] Days to Weeks    [] Other:     GOALS OF CARE     Patient/Medical POA stated Goal of Care: optimize patient comfort    [] I have reviewed and/or updated ACP information in the Advance Care Planning Navigator. This information is available in the [address] link in the patient's chart header. Primary Medical Decision Maker:   Primary Decision Maker: Radha Vanessa - Son - [phone]    Secondary Decision Maker: Neftali Toussaint - Daughter-in-Law - [phone]    Resuscitation Status: DNR  If DNR is there a Durable DNR on file? : [] Yes [x] No (If no, complete Durable DNR)--completed at time of hospice admission    HISTORY     History obtained from: chart, hospice team, patient    CHIEF COMPLAINT: anxiety \"can't keep my air\"  The patient is:   [x] Verbal  [] Nonverbal  [] Unresponsive    HPI/SUBJECTIVE:    6/29: patient awake and able to speak in short sentences. He is on HFNC. Appears to have labored breathing and anxiety, which he endorses. Endorses restlessness. States that he can't keep his air. His goal is for comfort care with hospice. He is amenable to receiving comfort medications. States the ativan he received last night helped him to sleep. When asked about pain, he states he has general pain all over. He thinks he had a small BM today. He has had some sips/small bites. 6/30: patient resting but wakes to name. Does endorse anxiety and labored breathing. He does find comfort medications helpful. States he is happy to be resting well. His son is at bedside and is pleased he is more comfortable. States his dad says he is ready to die. Son believes time may be short and has called his brother to let him know. Son would like his dad to stay at [facility] since he is comfortable here.      REVIEW OF SYSTEMS     The following systems were: [] reviewed  [x] unable to be reviewed in full due to lethargy/patient resting    Positive ROS include bold items:  Constitutional: fatigue, weakness, in pain, short of breath  Ears/nose/mouth/throat: increased airway secretions  Respiratory:shortness of breath, wheezing  Gastrointestinal:poor appetite, nausea, vomiting, abdominal pain, constipation, diarrhea  Musculoskeletal:pain, deformities, swelling legs  Neurologic:confusion, hallucinations, weakness  Psychiatric:anxiety, feeling depressed, poor sleep  Endocrine: increased thirst, increased hunger    Adult Non-Verbal Pain Assessment Score: 2    Face  [x] 0   No particular expression or smile  [] 1   Occasional grimace, tearing, frowning, wrinkled forehead  [] 2   Frequent grimace, tearing, frowning, wrinkled forehead    Activity (movement)  [] 0   Lying quietly, normal position  [x] 1   Seeking attention through movement or slow, cautious movement  [] 2   Restless, excessive activity and/or withdrawal reflexes    Guarding  [x] 0   Lying quietly, no positioning of hands over areas of body  [] 1   Splinting areas of the body, tense  [] 2   Rigid, stiff    Physiology (vital signs)  [] 0   Stable vital signs  [] 1   Change in any of the following: SBP > 20mm Hg; HR > 20/minute  [] 2   Change in any of the following: SBP > 30mm Hg; HR > 25/minute    Respiratory  [] 0   Baseline RR/SpO2, compliant with ventilator  [x] 1   RR > 10 above baseline, or 5% drop SpO2, mild asynchrony with ventilator  [] 2   RR > 20 above baseline, or 10% drop SpO2, asynchrony with ventilator     FUNCTIONAL ASSESSMENT     Palliative Performance Scale (PPS): 20       PSYCHOSOCIAL/SPIRITUAL ASSESSMENT     Active Problems:    Metastatic breast cancer ([facility] (6/29/2021)      Past Medical History:   Diagnosis Date    Atrial fibrillation ([facility]     Bladder cancer ([facility]     Breast cancer ([facility] 07/26/2011    Breast cancer, male ([facility] 07/05/2012    Colon polyps 7/26/2011    Glucose intolerance (impaired glucose tolerance) 10/31/2013    Prostate cancer ([facility] 07/26/2011    Typhoid fever 7/26/2011      Past Surgical History:   Procedure Laterality Date    HX CATARACT REMOVAL      HX MASTECTOMY      HX POLYPECTOMY      HX PROSTATECTOMY      HX TONSILLECTOMY      IR THORACENTESIS CATH W IMAGE  11/8/2019      Social History     Tobacco Use    Smoking status: Former Smoker     Packs/day: 2.00    Smokeless tobacco: Never Used   Substance Use Topics    Alcohol use: Yes     Alcohol/week: 5.8 standard drinks     Types: 7 Standard drinks or equivalent per week     Comment: A drink at night     No family history on file.    No Known Allergies   Current Facility-Administered Medications   Medication Dose Route Frequency    albuterol (PROVENTIL VENTOLIN) nebulizer solution 2.5 mg  2.5 mg Nebulization Q4H PRN    ondansetron (ZOFRAN) injection 4 mg  4 mg IntraVENous Q4H PRN    LORazepam (ATIVAN) injection 1 mg  1 mg IntraVENous Q4H    LORazepam (ATIVAN) injection 1 mg  1 mg IntraVENous Q30MIN PRN    balsam peru-castor oiL (VENELEX) ointment   Topical TID    saline peripheral flush soln 5 mL  5 mL InterCATHeter PRN    LORazepam (ATIVAN) injection 1 mg  1 mg IntraVENous Q15MIN PRN    acetaminophen (TYLENOL) tablet 650 mg  650 mg Oral Q4H PRN    glycopyrrolate (ROBINUL) injection 0.2 mg  0.2 mg IntraVENous Q4H PRN    senna-docusate (PERICOLACE) 8.6-50 mg per tablet 2 Tablet  2 Tablet Oral BID PRN    bisacodyL (DULCOLAX) suppository 10 mg  10 mg Rectal DAILY PRN    bacitracin 500 unit/gram packet 1 Packet  1 Packet Topical BID    furosemide (LASIX) tablet 20 mg  20 mg Oral BID    NIFEdipine ER (PROCARDIA XL) tablet 30 mg  30 mg Oral DAILY    prochlorperazine (COMPAZINE) with saline injection 5 mg  5 mg IntraVENous Q4H PRN    digoxin (LANOXIN) tablet 0.25 mg  0.25 mg Oral DAILY    morphine injection 2 mg  2 mg IntraVENous Q4H    morphine injection 2 mg  2 mg IntraVENous Q30MIN PRN        PHYSICAL EXAM     Wt Readings from Last 3 Encounters:   06/29/21 65 kg (143 lb 4.8 oz)   06/25/21 65 kg (143 lb 3.2 oz)   05/18/21 68 kg (150 lb)       Visit Vitals  /76   Pulse 89   Temp 97.5 °F (36.4 °C)   Resp 20   Ht 5' 9\" (1.753 m)   Wt 65 kg (143 lb 4.8 oz)   SpO2 (!) 84%   BMI 21.16 kg/m²       Supplemental O2  [x] Yes  [] NO  Last bowel movement: today per patient    Currently this patient has:  [] Peripheral IV [] PICC  [] PORT [] ICD    [] Smith Catheter [] NG Tube   [] PEG Tube    [] Rectal Tube [] Drain  [] Other:     Constitutional: lying abed, elderly, frail, appears more comfortable but still with some labored breathing  Eyes: lids normal, no drainage  ENMT: some dryness of mm, no exudate, nares normal  Cardiovascular: tachycardia, radial pulses palpable, no LE edema  Respiratory: rate 20s with appearance of accessory muscle use and labored breathing  Gastrointestinal: soft, NT  Musculoskeletal: no gross deformity or TTP  Skin: warm, dry, no mottling  Neurologic: he wakes to name call and answers a couple questions before falling back to sleep  Psychiatric: lethargic, not agitated at this time though he says he is anxious      Pertinent Lab and or Imaging Tests:  Lab Results   Component Value Date/Time    Sodium 138 06/28/2021 03:17 AM    Potassium 3.6 06/28/2021 03:17 AM    Chloride 101 06/28/2021 03:17 AM    CO2 33 (H) 06/28/2021 03:17 AM    Anion gap 4 (L) 06/28/2021 03:17 AM    Glucose 116 (H) 06/28/2021 03:17 AM    BUN 23 (H) 06/28/2021 03:17 AM    Creatinine 0.79 06/28/2021 03:17 AM    BUN/Creatinine ratio 29 (H) 06/28/2021 03:17 AM    GFR est AA >60 06/28/2021 03:17 AM    GFR est non-AA >60 06/28/2021 03:17 AM    Calcium 8.7 06/28/2021 03:17 AM     Lab Results   Component Value Date/Time    Protein, total 6.5 06/26/2021 04:18 AM    Albumin 2.8 (L) 06/26/2021 04:18 AM           Kacie Oconnor MD  [facility]

## 2021-06-30 NOTE — HOSPICE
16:00: Patient resting with family bedside. He woke, smiled and asked for cold water. Pt states that he is comfortable. 12:00: Bedside assessment with Dr. Trish Plata. Pt son, Kelsey Lu also bedside. Reviewed plan of care. Plan to have patient remain at [facility] under GIP, and not transfer to [facility] due to his symptoms. Reviewed plan of care with respiratory therapy team. Medication adjustments made: Lorazepam from 0..5mg to 1mg Q 4 hours scheduled with PRN dose available. 10:30: Patient now on High Flow 35 lpm. SAT remains in high 90's. Pt requests soda. States that he feels comfortable. He remains fatigued, nodding off to sleep sitting up in bed. 09:37: Order entered for Respiratory Therapy. Reviewed hospice plan of care with RT, May Trino. Plan to decrease oxygen from 50 lpm to 30 lpm, and reassess in an hour. If pt states his symptoms are managed, will continue to decrease to 20 lpm. Once stable on 20 lpm, per RT, pt can be transitioned to Mid Flow. Plan to continue to communicate with unit nurse, Bernard Borja RN and RT. Shadia Cadena [facility]  [phone]    [facility] Daily Nursing Note   Patient Name: Efren Zapata III  YOB: 1932  Age: 80 y.o. Date of Visit: 06/30/21  Facility of Care: [facility]  Patient Room: [facility] Attending: Jenna Isaacs MD  Hospice Diagnosis: Metastatic breast cancer [facility]) [C50.919]    Level of Care: [facility]    Current [facility] Symptoms    1. Respiratory distress/dyspnea- High Flow was increased from 30 lpm to 50 lpm overnight. 2. Anxiety  3. Intermittent nausea  4. Generalized pain in his chest        ASSESSMENT & PLAN     ASSESSMENT  Pt sleeping when entered room. He remains on cardiac monitor, although order was placed last evening to discontinue cardiac monitoring. Pt High Flow oxygen was increased from 30 lpm to 50 lpm overnight. Pt awoke when spoken to.  He woke slightly confused, stating he slept \"so good\", and, \" I haven't slept this late in a long time\". Pt requests coffee. Discussed goal of transferring to the hospice house. Pt shared that his goal remains to transfer. Re-educated pt on titration oxygen to a level for him to safely transfer. Reviewed plan of care with nursing staff. Hospice nurse turned off cardiac monitor. Requests bedside nurse medicate with scheduled 08:00 morphine and lorazepam. Plan to titrate oxygen from 50 lpm to 30 lpm after scheduled dose. .  Visit Vitals  /76   Pulse 89   Temp 97.5 °F (36.4 °C)   Resp 20   Ht 5' 9\" (1.753 m)   Wt 65 kg (143 lb 4.8 oz)   SpO2 93%   BMI 21.16 kg/m²      Plan of Care:     1. GIP level of care needed for symptoms necessitating frequent skilled nursing assessment and administration of parenteral  medications. Needs monitoring for need to titrate sx mgt regimen for optimization of comfort. 2. Pt is at high risk of rapid decline and death due to terminal disease process  3. Provide education and support to unit staff caring for hospice patient and family regarding end of life care and Hospice plan of care.  Provide staff with direct contact information to reach hospice team [phone]   4. Provide support and frequent rounds for patient comfort and safety ongoing  5. Provide  support ongoing, continue to discuss discharge plan if patient becomes payal and does not require acute nursing care interventions for GIP level of care  6. Provide  and bereavement support ongoing  7. Plan to titrate oxygen from High Flow to Mid Flow NC. Once successfully titrated to mid-flow, plan to move patient from 4th floor to [address] [place] [address] to transfer patient to [facility] if stable on Wednesday to continue with GIP LOC under Hospice services. 8. Continue with Oral scheduled medications if patient continues to tolerate. 9. Schedule Morphine 2mg IV every 4 hours with Lorazepam 0.5mg IV every 4 hours.   10. Add PRN doses of IV Morphine 2mg every 30 minutes and IV lorazepam 0.5mg every 3 minutes PRN Compazine and IV Zofran available for nausea. Pt states he feels constipated. Scheduled Senna 2 tabs BID with suppository PRN  11 . Maintain skin integrity as tolerated for hospice patient, turning and repositioning for comfort, and specialty mattress if appropriate. Continue with wound care recommendations:  Heels offloaded with pillows. Heels intact with blanching pink erythema. 1. POA Sacrum, Deep Tissue Pressure Injury: 8 x 7 x 0 cm  Non blanching purple and red erythema. 2.  POA Penis, on shaft just below the glans.  There is a partial thickness wound with pink wound bed.   Wound, Pressure Prevention & Skin Care Recommendations:    1. Minimize layers of linen/pads under patient to optimize support surface.    2.  Turn/reposition approximately every 2 hours and offload heels.    3.  Manage moisture/ Keep skin folds clean and dry. 4.  Sacrum, buttocks and heels:  Venelex TID. 5.  Penis:  Bacitracin TID     12. Peripheral IV line care as per hospital policy for infection prevention  13. Plan to transfer patient to [facility] if stable on Wednesday to continue with GIP LOC under Hospice services. 14. Discontinue cardiac monitor    Spiritual Interventions: Continue hospice chaplain support ongoing    Psych/ Social/ Emotional Interventions: Continue with hospice social worker support ongoing. Validate patient anxiety and fears over his respiratory distress. Care Coordination Needs: Continue to coordinate hospice plan of care with patient/family and 4th floor [facility] nursing staff. Educated to not use cardiac monitor, and not to increase high flow oxygen. Care plan and New Orders discussed / approved with Vaishali Engle MD.    Description History and Chart Review     Narrative History of last 24 hours that demonstrates care cannot be provided in another setting:  Scheduled IV Morphine and lorazepam for respiratory distress, anxiety and pain. PRN use dosing. High Flow oxygen.     What has been done to control the patient's symptoms in the last 24 hours? Scheduled dosing of IV symptom medications, High Flow oxygen    Does the patient currently require IV medications? Yes  Does the patient currently require scheduled medications? Yes  Does the patient currently require a PCA? No    List number of doses of PRN medications in last 24 hours:  Medication 1:  Number of doses:    Medication 2:   Number of doses:    Medication 3:   Number of doses:    Supporting documentation for GIP need for pain control:  [x] Frequent evaluation by a doctor, nurse practitioner, nurse   [] Frequent medication adjustment    [x] IVs that cannot be administered at home   [] Aggressive pain management   [] Complicated technical delivery of medications              Supporting documentation for GIP need for symptom control:  []  Sudden decline necessitating intensive nursing intervention  [x]  Intermittent / intractable nausea or vomiting   []  Pathological fractures  []  Advanced open wounds requiring frequent skilled care  [x] Unmanageable respiratory distress  [] New or worsening delirium   [] Delirium with behavior issues: Is 24 hour caregiver present due to safety concerns with agitation? (yes/no)  [] Imminent death  with skilled nursing needs documented above    DISCHARGE PLANNING   1. Discharge Plan: If patient stabilizes, and if his PCR COVID results are negative, family would like for patient to be transferred to [facility]. Once stable at [facility], family plans to have pt return to his own home with private 24 hour caregivers. 2. Patient/Family teaching: Pt and son's, Alyssa Anthony Ochoa  3.  Response to patient/family teaching: Family state agreement with hospice plan of care    ASSESSMENT    KARNOFSKY: 30    Prognosis estimated based on 06/30/21 clinical assessment is:     [x] Few to Many Days       Quality Measure: Patient self-reports:  [x] Yes    [] No    ESAS:   Time of Assessment: 08:30  Pain (1-10): 2  Fatigue (1-10): 5  Shortness of breath (1-10):5  Nausea (1-10): 2  Appetite (1-10): Anxiety: (1-10):  7  Depression: (1-10):   Well-being: (1-10):   Constipation: _ Yes  _ No  LAST BM: 6/29/2021    CLINICAL INFORMATION     Patient Vitals for the past 12 hrs:   Temp Pulse Resp BP SpO2   06/30/21 0449 97.5 °F (36.4 °C) 89 20 113/76 93 %   06/30/21 0427     93 %   06/29/21 2257     93 %       Currently this patient has:  [x] Supplemental O2 High FLow  [x] IV    [] PICC      [] PORT   [] NG Tube    [] PEG Tube   [] Ostomy     [] Smith draining _______ urine  [] Other:     SIGNS/PHYSICAL FINDINGS     Skin (including wound):  [] Warm, dry, supple, intact and color normal for race  [x] Warm   [] Dry   [] Cool     [x] Clammy       [] Diaphoretic    Turgor   [x] Normal   [] Decreased  Color:   [] Pink   [] Pale   [] Cyanotic   [] Erythema   [] Jaundice   [x] Normal for Race  [x]  Wounds: Scrotum, penis- current wound care orders    Neuro:  [x] Lethargy  [] Restlessness / agitation  [] Confusion / delirium  [] Hallucinations  [] Responds to maximal stimulation  [] Unresponsive  [] Seizures     Cardiac:  [x] Dyspnea on Exertion  [] JVD  [] Murmur  [] Palpitations  [] Hypotension  [] Hypertension  [x] Tachycardia  [] Bradycardia  [x] Irregular HR  [] Pulses Decreased  [] Pulses Absent  [] Edema:       (Location, Grade and Pitting)  [] Mottling:      (Location)    Respiratory:  Breath sounds:    [x] Diminished/ tight   [] Wheeze   [] Rhonchi   [] Rales   [x] Even and labored  [] Labored:            [] Cough   [] Non Productive   [] Productive    [] Description:           [] Deep suctioned   [x] O2 at __50_ LPM  [x] High flow oxygen greater than 10 LPM  [] Bi-Pap    GI  [x] Abdomen softly distinded.   [] Ascites  [] Nausea  [] Vomiting  [] Incontinent of bowels  [] Bowel sounds (yes/no)  [] Diarrhea  [] Constipation (see above including last bowel movement)  [] Checked for impaction  [x] Last BM 6/29/2021    Nutrition  Diet:___Poor_______  Appetite: [] Good   [] Fair   [] Poor   [] Tube Feeding       [x] Voiding  [] Incontinent   [] Smith    Musculoskeletal  [] Balance/Raymond Unsteady   [x] Weak   Strength:    [] Normal    [] Limited    [x] Decreasing   Activities:    [] Up as tolerated   [] Bedridden    [x] Specify: currently in the bed; with max assist could sit in chair. SAFETY  [] 24 hr. Caregiver   [x] Side rails ?     [x] Hospital bed   [x] Reviewed Falls & Safety       ALLERGIES AND MEDICATIONS     Allergies: No Known Allergies    Current Facility-Administered Medications   Medication Dose Route Frequency    albuterol (PROVENTIL VENTOLIN) nebulizer solution 2.5 mg  2.5 mg Nebulization Q4H PRN    balsam peru-castor oiL (VENELEX) ointment   Topical TID    saline peripheral flush soln 5 mL  5 mL InterCATHeter PRN    LORazepam (ATIVAN) injection 1 mg  1 mg IntraVENous Q15MIN PRN    acetaminophen (TYLENOL) tablet 650 mg  650 mg Oral Q4H PRN    glycopyrrolate (ROBINUL) injection 0.2 mg  0.2 mg IntraVENous Q4H PRN    senna-docusate (PERICOLACE) 8.6-50 mg per tablet 2 Tablet  2 Tablet Oral BID PRN    bisacodyL (DULCOLAX) suppository 10 mg  10 mg Rectal DAILY PRN    bacitracin 500 unit/gram packet 1 Packet  1 Packet Topical BID    furosemide (LASIX) tablet 20 mg  20 mg Oral BID    NIFEdipine ER (PROCARDIA XL) tablet 30 mg  30 mg Oral DAILY    prochlorperazine (COMPAZINE) with saline injection 5 mg  5 mg IntraVENous Q4H PRN    digoxin (LANOXIN) tablet 0.25 mg  0.25 mg Oral DAILY    morphine injection 2 mg  2 mg IntraVENous Q4H    LORazepam (ATIVAN) injection 0.5 mg  0.5 mg IntraVENous Q4H    LORazepam (ATIVAN) injection 0.5 mg  0.5 mg IntraVENous Q30MIN PRN    morphine injection 2 mg  2 mg IntraVENous Q30MIN PRN          Visit Time In: 08:30  Visit Time Out: 10:00    Juli Shannon RN, [facility] Nurse Liaison  [phone] Mobile  [phone] Office  Available on [employer]

## 2021-06-30 NOTE — PROGRESS NOTES
06/29/21 2547   Oxygen Therapy   O2 Sat (%) 93 %   Pulse via Oximetry 116 beats per minute   O2 Device Hi flow nasal cannula; Heated;Humidifier   O2 Flow Rate (L/min) 50 l/min  (increased d/t desaturation event, RN aware)   O2 Temperature 93.2 °F (34 °C)   FIO2 (%) 95 %   Pre-Treatment   Breathing Pattern Labored;Irregular; Shallow; Tachypneic   Breath Sounds Bilateral Coarse;Diminished   Respirations 29     Please advise, patient has required an increase in O2 requirement. Patient had acute desaturation event overnight to 81-83%. Patient had labored respiration & was visibly struggling therefore increase in O2 was needed for pt comfort. Goal is for patient to be weaned to Mid-flow cannula then he can be transferred to  [address]. At this time, weaning will not be possible. RN was at bedside. RN did not alert RT to comfort care status till O2 was increase.   Thank you

## 2021-07-01 NOTE — PROGRESS NOTES
Responded to notification of pt death in 36; visited with pt's sister, son, and niece at bedside; family confirmed pt's own  was present earlier and provided [employer] care in their own tradition; provided condolences and assurance of prayer; family did not have any questions for me at this time. .  Jeffery Lew, Ph.D., M.Div., M.A.,   /Director of [id number] [place] [address]  Paging Service: [phone])

## 2021-07-01 NOTE — [facility] IDG MASTER NOTE
[facility]  Date: 07/01/21  Time: 9:56 AM    ___________________    Patient: Lula Mejía III  Coverage Information:     [place]or: [place] MED[place]     Plan: [place] MEDICARE PART A & B     Subscriber ID: [id number]     Phone Number:   MRN: [id number]  CCN:   [place] Claim No. :     Hospice Election Date:   Current Benefit Period: Benefit Period 1  Start Date: 6/29/2021  End Date: 9/26/2021      Medical Director:   Hospice Attending Provider: Alexandra Weston [phone] [place] [address]  Phone: [phone]  Fax: [phone]    Level of Care: General Inpatient Care      ___________________    Diagnoses:  Diagnoses of Labored breathing, Tachypnea, Anxiety, Restlessness, Generalized pain, Hospice care, Metastatic breast cancer ([address] and Lethargy were pertinent to this visit.     Current Medications:    Current Facility-Administered Medications:     albuterol (PROVENTIL VENTOLIN) nebulizer solution 2.5 mg, 2.5 mg, Nebulization, Q4H PRN, Juliette Evangelista MD    [facility] injection 4 mg, 4 mg, IntraVENous, Q4H PRN, Juliette Evangelista MD, 4 mg at 06/30/21 1217    LORazepam (ATIVAN) injection 1 mg, 1 mg, IntraVENous, Q4H, Juliette Evangelista MD, 1 mg at 07/01/21 0727    LORazepam (ATIVAN) injection 1 mg, 1 mg, IntraVENous, Q30MIN PRN, Juliette Evangelista MD    balsam peru-castor oiL (VENELEX) ointment, , Topical, TID, Juliette Evangelista MD, Given at 07/01/21 5536    saline peripheral flush soln 5 mL, 5 mL, InterCATHeter, PRN, Juliette Evangelista MD    LORazepam (ATIVAN) injection 1 mg, 1 mg, IntraVENous, Q15MIN PRN, Juliette Evangelista MD    acetaminophen (TYLENOL) tablet 650 mg, 650 mg, Oral, Q4H PRN, Juliette Evangelista MD    glycopyrrolate (ROBINUL) injection 0.2 mg, 0.2 mg, IntraVENous, Q4H PRN, Juliette Evangelista MD    senna-docusate (PERICOLACE) 8.6-50 mg per tablet 2 Tablet, 2 Tablet, Oral, BID PRN, Juliette Evangelista MD  [facility] bisacodyL (DULCOLAX) suppository 10 mg, 10 mg, Rectal, DAILY PRN, Zachary Villarreal MD    bacitracin 500 unit/gram packet 1 Packet, 1 Packet, Topical, BID, Zachary Villarreal MD, 1 Packet at 06/30/21 6382    prochlorperazine (COMPAZINE) with saline injection 5 mg, 5 mg, IntraVENous, Q4H PRN, Zachary Villarreal MD    morphine injection 2 mg, 2 mg, IntraVENous, Q4H, Zachary Villarreal MD, 2 mg at 07/01/21 6764    morphine injection 2 mg, 2 mg, IntraVENous, Q30MIN PRN, Zachary Villarreal MD    Orders:  Orders Placed This Encounter    ADULT DIET Regular; hOSPICE PATIENT PLEASE ASSESS LIKES AND FAVORITES     Standing Status:   Standing     Number of Occurrences:   1     Order Specific Question:   Primary Diet:     Answer:   Regular     Order Specific Question:   Likes/Dislikes/Preferences: Answer:   hOSPICE PATIENT PLEASE ASSESS LIKES AND FAVORITES    NURSING-MISCELLANEOUS: NO admission labs, x-rays or other diagnostic tests, unless pertinent to symptom control . 1.  CONTINUOUS     1. Standing Status:   Standing     Number of Occurrences:   1     Order Specific Question:   Description of Order:     Answer:   NO admission labs, x-rays or other diagnostic tests, unless pertinent to symptom control .  COMFORT MEASURES ONLY     Standing Status:   Standing     Number of Occurrences:   1    VITAL SIGNS     PRN     Standing Status:   Standing     Number of Occurrences:   1    NOTIFY PROVIDER: SPECIFY NOTIFY [phone]: FOR PAIN, DYSPNEA, AGITATION, OTHER DISTRESS OR NOT RESPONDING TO ORDERED INTERVENTIONS ONE TIME Routine     Standing Status:   Standing     Number of Occurrences:   1     Order Specific Question:   Please describe the test or procedure you would like to order.      Answer:   NOTIFY Sheryle Dade [phone]: FOR PAIN, DYSPNEA, AGITATION, OTHER DISTRESS OR NOT RESPONDING TO ORDERED INTERVENTIONS    TURN & POSITION     TURN & POSITION EVERY 6 HOURS - PATIENT MAY REFUSE     Standing Status:   Standing     Number of Occurrences:   1    UP AD ANGIE     Standing Status:   Standing     Number of Occurrences:   90064    SKIN CARE AND SKIN CHECKS     Standing Status:   Standing     Number of Occurrences:   1    ELEVATE HEAD OF BED     Standing Status:   Standing     Number of Occurrences:   1    NURSING-MISCELLANEOUS: Admit to [facility] level of care; SN visit daily X 7 with 5 visits PRN symptom control; SW visit 1 X weekly and 5 visits PRN family support,  visit 1 X weekly and 5 visits PRN spiritual support. CONTINUOUS     Standing Status:   Standing     Number of Occurrences:   1     Order Specific Question:   Description of Order:     Answer:   Admit to GIP level of care; SN visit daily X 7 with 5 visits PRN symptom control; SW visit 1 X weekly and 5 visits PRN family support,  visit 1 X weekly and 5 visits PRN spiritual support.  NURSING-MISCELLANEOUS: call hospice at time of death [phone] CONTINUOUS     Standing Status:   Standing     Number of Occurrences:   1     Order Specific Question:   Description of Order:     Answer:   call hospice at time of death [phone]    DRESSING, CHANGE     Change foam dressing applied to SACRUM every 3 to 7 days. Peel back every shift to assess the skin and resecure     Standing Status:   Standing     Number of Occurrences:   1    DISCONTINUE CARDIAC MONITORING     Standing Status:   Standing     Number of Occurrences:   1    FLOAT HEELS     Standing Status:   Standing     Number of Occurrences:   1    DO NOT RESUSCITATE     Standing Status:   Standing     Number of Occurrences:   1    DIET ONE TIME MESSAGE     Comfort cart for 3 adults. Please deliver to room at 16:00 for hospice patient. Standing Status:   Standing     Number of Occurrences:   1    DIET ONE TIME MESSAGE     Pt did not get breakfast tray, pt would like pastry please.      Standing Status:   Standing     Number of Occurrences:   1    DIET ONE TIME MESSAGE     Please send bereavement cart for 5     Standing Status:   Standing     Number of Occurrences:   1    OXIMETRY, SPOT CHECK     Standing Status:   Standing     Number of Occurrences:   12592    OXYGEN HIGH FLOW     Titrate for comfort, Patient is comfort care / Hospice     Standing Status:   Standing     Number of Occurrences:   1     Order Specific Question:   Liters per minute: Answer:   48     Order Specific Question:   Indications for O2 therapy     Answer:   HYPOXIA     Order Specific Question:   Indications for O2 therapy     Answer:   RESPIRATORY DISTRESS     Order Specific Question:   Oxygen Heated? Answer:   Yes     Order Specific Question:   FIO2     Answer:   95    RT--OXIMETRY, CONTINUOUS     Standing Status:   Standing     Number of Occurrences:   1    RT--RESPIRATORY THERAPY MISCELLANEOUS Wean O2 Routine PRN     Please wean HFNC to Mid-flow cannula. Patient will be transferred to  [address] once weaned. Standing Status:   Standing     Number of Occurrences:   87003     Order Specific Question:   Description of Order:     Answer:   Wean O2    IP CONSULT TO RESPIRATORY CARE     Pt is on hospice care with goal to transfer today to hospice house. He will have to be on Mid-flow oxygen for transport. Titrate from 50 lpm to 30 lpm. Do not increase. Request nurse give PRN if he becomes symptomatic. Standing Status:   Standing     Number of Occurrences:   1    INSERT PERIPHERAL IV Maintain peripheral IV for hospice patient. Dressing changes as per unit policy  ONE TIME Routine     Maintain peripheral IV for hospice patient.  Dressing changes as per unit policy     Standing Status:   Standing     Number of Occurrences:   1    TRANSFER PATIENT     Standing Status:   Standing     Number of Occurrences:   1     Order Specific Question:   Type of Bed     Answer:   Inpatient Hospice [5]     Order Specific Question:   Comments     Answer:   When pt titrated to Mid-flow oxygen transfer to  [place].    TRANSFER PATIENT Standing Status:   Standing     Number of Occurrences:   1     Order Specific Question:   Type of Bed     Answer:   Inpatient Hospice [5]     Order Specific Question:   Cardiac Monitoring Required? Answer:   No     Order Specific Question:   Comments     Answer:   6 [facility] if bed available please.  FALL PRECAUTIONS     Standing Status:   Standing     Number of Occurrences:   1    SPECIALTY BED     Med/Surg samara with air blower     Standing Status:   Standing     Number of Occurrences:   1    balsam peru-castor oiL (VENELEX) ointment    saline peripheral flush soln 5 mL    polyethylene glycol (MIRALAX) packet 17 g    DISCONTD: polyethylene glycol (MIRALAX) packet 17 g    DISCONTD: prochlorperazine (COMPAZINE) injection 10 mg    LORazepam (ATIVAN) injection 1 mg    acetaminophen (TYLENOL) tablet 650 mg    glycopyrrolate (ROBINUL) injection 0.2 mg    senna-docusate (PERICOLACE) 8.6-50 mg per tablet 2 Tablet    bisacodyL (DULCOLAX) suppository 10 mg    bacitracin 500 unit/gram packet 1 Packet    DISCONTD: prochlorperazine (COMPAZINE) with saline injection 10 mg    DISCONTD: furosemide (LASIX) tablet 20 mg    DISCONTD: NIFEdipine ER (PROCARDIA XL) tablet 30 mg    prochlorperazine (COMPAZINE) with saline injection 5 mg    DISCONTD: digoxin (LANOXIN) tablet 0.25 mg    morphine injection 2 mg    DISCONTD: LORazepam (ATIVAN) injection 0.5 mg    DISCONTD: LORazepam (ATIVAN) injection 0.5 mg    morphine injection 2 mg    albuterol (PROVENTIL VENTOLIN) nebulizer solution 2.5 mg     Order Specific Question:   MODE OF DELIVERY     Answer:   Nebulizer     Order Specific Question:   Initiate RT Bronchodilator Protocol     Answer: Yes    ondansetron (ZOFRAN) injection 4 mg    LORazepam (ATIVAN) injection 1 mg    LORazepam (ATIVAN) injection 1 mg    INITIAL PHYSICIAN ORDER: INPATIENT Inpatient Hospice; No; 3.  Patient receiving treatment that can only be provided in an inpatient setting (further clarification in H&P documentation)     Standing Status:   Standing     Number of Occurrences:   1     Order Specific Question:   Status: Answer:   HOSPICE [114]     Order Specific Question:   Type of Bed     Answer:   Inpatient Hospice [5]     Order Specific Question:   Cardiac Monitoring Required? Answer:   No     Order Specific Question:   Inpatient Hospitalization Certified Necessary for the Following Reasons     Answer:   3. Patient receiving treatment that can only be provided in an inpatient setting (further clarification in H&P documentation)     Order Specific Question:   Admitting Diagnosis     Answer:   Metastatic breast cancer [facility] [[id number]]     Order Specific Question:   Admitting Physician     Answer:   Temitope Tran [[id number]]     Order Specific Question:   Attending Physician     Answer:   Temitope Tran [[id number]]     Order Specific Question:   Estimated Length of Stay     Answer:   3-4 Midnights     Order Specific Question:   Discharge Plan:     Answer: Other (Specify)     Order Specific Question:   Comments     Answer: To Hospice House, then to Home with Hospice       Allergies:  No Known Allergies    Care Plan:  Encounter Problems (Active)     Problem: Anxiety     Dates: Start: 06/29/21       Description: Deficit related to anxiety. Disciplines: Interdisciplinary    Goal: Grief heard and acknowledged, anxiety reduced, patient coping identified, patient/family expressed gratitude     Dates: Start: 06/29/21       Priority: High    Description: Pt will have support for in coping with and processing feelings of anxiety within 5 days. Disciplines: Interdisciplinary    Intervention: ASSIST WITH ANXIETY     Dates: Start: 06/29/21       Description: LCSW will provide support for pt in coping with and processing feelings of anxiety.               Problem: Breathing Pattern - Ineffective     Dates: Start: 06/30/21       Disciplines: Nurse, Interdisciplinary, RT    Goal: *Absence of hypoxia     Dates: Start: 06/30/21       Disciplines: Interdisciplinary    Intervention: Breathing pattern signs and symptoms assessment (eg: Apnea; bradypnea; pursed-lip; dyspnea; hyperpnea; paradoxical; periodic; hyperventilation; hypoventilation; tachypnea)     Dates: Start: 06/30/21          Intervention: Monitor for change in patient condition (eg: Vital signs; hypoxemia; mental status; level of consciousness; skin temperature, color)     Dates: Start: 06/30/21          Intervention: Respiratory management (eg: Adequate humidification; early mobilization; perform manual percussion/vibration; postural drainage; suctioning; splinting)     Dates: Start: 06/30/21          Intervention: Review for respiratory depression and administer medications as prescribed     Dates: Start: 06/30/21             Goal: *Use of effective breathing techniques     Dates: Start: 06/30/21       Disciplines: Interdisciplinary    Intervention: Anxiety assessment (eg: Attention span; behavior manifestation; coping mechanism;  self-perception; sleep pattern)     Dates: Start: 06/30/21          Intervention: Cough ability assessment (eg: Characteristics; effectiveness; reflex)     Dates: Start: 06/30/21          Intervention: Mental status assessment     Dates: Start: 06/30/21          Intervention: Incentive spirometry     Dates: Start: 06/30/21          Intervention: Position change     Dates: Start: 06/30/21             Goal: *PALLIATIVE CARE:  Alleviation of Dyspnea     Dates: Start: 06/30/21       Disciplines: Nurse, Interdisciplinary, RT    Intervention: Refer patient for holistic services per facility     Dates: Start: 06/30/21                Problem: Emotional Support Needs     Dates: Start: 06/29/21       Description: Deficit related to emotional support.      Disciplines: Interdisciplinary    Goal: Patient/family is receiving emotional support     Dates: Start: 06/29/21       Priority: High    Description: Pt and family will have emotional support and supportive counseling in coping with his EOL due to metastatic breast ca within 5 days. Disciplines: Interdisciplinary    Intervention: Provide emotional support     Dates: Start: 06/29/21       Description: LCSW will provide emotional support and supportive counseling for pt and family in coping with his EOL due to metastatic breast ca. Problem: Falls - Risk of     Dates: Start: 06/29/21       Disciplines: Interdisciplinary    Goal: *Absence of Falls     Dates: Start: 06/29/21       Description: Document Torres Cardoso Fall Risk and appropriate interventions in the flowsheet. Disciplines: Interdisciplinary          Problem: Family Significant Other Needs     Dates: Start: 06/29/21       Description: Deficit related to community resources. Disciplines: Interdisciplinary    Goal: Patient/family will receive support from community resources     Dates: Start: 06/29/21       Priority: High    Description: Pt and family will have connection to needed community resources including caregiver resources within 5 days. Disciplines: Interdisciplinary    Intervention: MSW community resource planning     Dates: Start: 06/29/21       Description: LCSW will assist with community resources including caregiver resources.              Problem: Patient Education: Go to Patient Education Activity     Dates: Start: 06/29/21       Disciplines: Interdisciplinary    Goal: Patient/Family Education     Dates: Start: 06/29/21       Disciplines: Interdisciplinary          Problem: Patient Education: Go to Patient Education Activity     Dates: Start: 06/30/21       Disciplines: Interdisciplinary    Goal: Patient/Family Education     Dates: Start: 06/30/21       Disciplines: Interdisciplinary          Problem: Patient Education: Go to Patient Education Activity     Dates: Start: 06/30/21       Disciplines: Interdisciplinary    Goal: Patient/Family Education     Dates: Start: 06/30/21 Disciplines: Interdisciplinary          Problem: Pressure Injury - Risk of     Dates: Start: 06/30/21       Disciplines: Interdisciplinary    Goal: *Prevention of pressure injury     Dates: Start: 06/30/21       Description: Document David Scale and appropriate interventions in the flowsheet.     Disciplines: Interdisciplinary         Care Plan Problems/Goals      Progressing Towards Goal (3)      Patient/family is receiving emotional support (Emotional Support Needs)    Disciplines:  Interdisciplinary Expected end:  -        Outcome: Progressing Towards Goal By Lala Brown on 06/30/21 1337            Patient/family will receive support from community resources (Family Significant Other Needs)    Disciplines:  Interdisciplinary Expected end:  -        Outcome: Progressing Towards Goal By Lala Brown on 06/30/21 1337            *Absence of Falls (Falls - Risk of)    Disciplines:  Interdisciplinary Expected end:  -        Outcome: Progressing Towards Goal By Matilde Tay RN on 07/01/21 8966                         Not Progressing Towards Goal (4)      *Absence of hypoxia (Breathing Pattern - Ineffective)    Disciplines:  Interdisciplinary Expected end:  -        Outcome: Not Progressing Towards Goal By Pelon Rivera on 65/32/90 2174            *Use of effective breathing techniques (Breathing Pattern - Ineffective)    Disciplines:  Interdisciplinary Expected end:  -        Outcome: Not Progressing Towards Goal By Pelon Rivera on 79/87/25 7790            *PALLIATIVE CARE:  Alleviation of Dyspnea (Breathing Pattern - Ineffective)    Disciplines:  Nurse, Interdisciplinary, RT Expected end:  -        Outcome: Not Progressing Towards Goal By Pelon Rivera on 31/36/08 2622            Patient/Family Education (Patient Education: Go to Patient Education Activity)    Disciplines:  Interdisciplinary Expected end:  -        Outcome: Not Progressing Towards Goal By Pelon Rivera on 70/13/33 1820 No Outcome (4)      Grief heard and acknowledged, anxiety reduced, patient coping identified, patient/family expressed gratitude (Anxiety)    Disciplines:  Interdisciplinary Expected end:  -          Patient/Family Education (Patient Education: Go to Patient Education Activity)    Disciplines:  Interdisciplinary Expected end:  -          *Prevention of pressure injury (Pressure Injury - Risk of)    Disciplines:  Interdisciplinary Expected end:  -          Patient/Family Education (Patient Education: Go to Patient Education Activity)    Disciplines:  Interdisciplinary Expected end:  -                            ___________________    Care Team Notes  IDG clinical note 07/01/21  GIP LOC r/t  symptom management  Patient requires GIP LOC d/t frequent medication adjustments, care unable to be provided in home setting, frequent nursing assessments  Discharge to community hospice house should patient stabilize              POC/IDG Notes      [facility] IDG Nurse Notes by Nory Olsen RN at 07/01/21 6863  Version 1 of 1    Author: Nory Olsen RN Service: Hospice and Palliative Care Author Type: Registered Nurse    Filed: 07/01/21 0941 Date of Service: 07/01/21 7061 Status: Signed    : Nory Olsen RN (Registered Nurse)       Patient: Yuly Up III    Date: 07/01/21  Time: 9:38 AM    [facility] Nurse Notes  Admitted for met. Breast ca on high flow oxygen. Slow wean off hi-mara to nasal cannula. Now on nasal cannula  On scheduled morphine  Ativan titrated for better symptom relief  No longer responsive or tolerating PO medications    Family present and supportive, sons had been living out of state and country until patient's cancer diagnosis.  Son, Aaron Núñez living w/ father throughout chemo process         Signed by: Agustina Zambrano RN       [facility] IDG  Notes by Dorie Medel at 06/29/21 4245  Version 1 of 1    Author: Dorie Medel Service: Hospice and Palliative Care Author Type:     Filed: 06/29/21 1623 Date of Service: 06/29/21 1617 Status: Signed    : Ashley Tong ()       Pt is an 79 y/o CM with a hospcie diagnosis of metastatic breast cancer. Pt has comorbid anxiety. Pt was admitted to [facility] ED from MD office due to SOB. Pt is  and has a blended family of 4 sons, 2 biological sons Ginny Traore ( lives in [facility])  and Tierra Sandoval ( lives in [place] [address])  and 2 step sons Karen Bo ( MPOA), who lives near pt and runs the family farm, and another son. Problem; Need for emotional support. Intervention: LCSW will provide emotional support for pt and family in coping with his EOL due to metastatic breast ca. Plan: Continue to provide support for pt and family. Problem: Pt has anxiety. Intervention;LCSW will provide support for pt in coping with feelings of anxiety. Plan: LCSW will continue to provide suport to help reduce pt anxiety. Problem; Community resource needs:    Intervention: LCSW will assist with community resources including caregiver resources. Plan: LCSW will continue to assess and provide needed community resources. Tay  on Pablito Jacobsonmaria to serve. Low risk for bereavement for step son Sung Martin and son Candi Kingston. LCSW will continue to assess and monitor pt and family needs.              .                     Care Team Present:   Dr. Nano Wilson, clinical manager  Zahra Burnett, [address], RN Estela Jesus, RN Particia Shan, RN Nadia Puls, RN Yves Pitman, chaplain Charl Holter, bereavement

## 2021-07-01 NOTE — PROGRESS NOTES
Problem: Family Significant Other Needs  Goal: Patient/family will receive support from community resources  Description: Pt and family will have connection to needed community resources including caregiver resources within 5 days. Outcome: Progressing Towards Goal     Problem: Falls - Risk of  Goal: *Absence of Falls  Description: Document Mariela Ledezma Fall Risk and appropriate interventions in the flowsheet. Outcome: Progressing Towards Goal  Note: Fall Risk Interventions:  Mobility Interventions: Communicate number of staff needed for ambulation/transfer    Mentation Interventions: Door open when patient unattended, More frequent rounding, Room close to nurse's station    Medication Interventions: Evaluate medications/consider consulting pharmacy    Elimination Interventions: Call light in reach, Toileting schedule/hourly rounds              Problem: Patient Education: Go to Patient Education Activity  Goal: Patient/Family Education  Outcome: Progressing Towards Goal     Problem: Pressure Injury - Risk of  Goal: *Prevention of pressure injury  Description: Document David Scale and appropriate interventions in the flowsheet.   Outcome: Progressing Towards Goal  Note: Pressure Injury Interventions:  Sensory Interventions: Check visual cues for pain    Moisture Interventions: Absorbent underpads, Apply protective barrier, creams and emollients, Maintain skin hydration (lotion/cream)    Activity Interventions: Pressure redistribution bed/mattress(bed type)    Mobility Interventions: Float heels, HOB 30 degrees or less, Pressure redistribution bed/mattress (bed type)    Nutrition Interventions: Document food/fluid/supplement intake    Friction and Shear Interventions: HOB 30 degrees or less, Lift sheet, Minimize layers                Problem: Patient Education: Go to Patient Education Activity  Goal: Patient/Family Education  Outcome: Progressing Towards Goal

## 2021-07-01 NOTE — PROGRESS NOTES
[address] [facility] [id number]     6/5/1932  80 y.o.  male    Patient Telephone Number: [phone] (home)   Catholic Affiliation: Hilda Nieves   Language: English   Patient Active Problem List    Diagnosis Date Noted    Metastatic breast cancer ([facility] 06/29/2021    Acute respiratory failure ([address] 06/25/2021    Pneumonia 06/25/2021    Atherosclerosis of aorta ([facility] 07/31/2019    Bladder cancer ([facility]     Glucose intolerance (impaired glucose tolerance) 10/31/2013    Breast cancer, male ([address] 07/05/2012    Prostate cancer ([facility] 07/26/2011    Colon polyps 07/26/2011    Typhoid fever 07/26/2011    Breast cancer ([facility] 07/26/2011    Atrial fibrillation ([facility]         Date: 7/1/2021            Total Time (in minutes): 5          [address] MED ONCOLOGY    Mental Status:   [  ] Alert [  ] Guyann Quick [  ]  Confused  [x] Minimally responsive  [  ] Sleeping    Communication Status: [  ] Impaired Speech [  ] Nonverbal -N/A    Physical Status:   [  ] Oxygen in use  [  ] Hard of Hearing [  ] Vision Impaired  [  ] Ambulatory  [  ] Ambulatory with assistance [  ] Non-ambulatory -N/A    Music Preferences, Background: N/A: Please See Session Observations Below    Clinical Problem to be addressed: Support healthy family coping and comfort    Goal(s) met in session: N/A: Please See Session Observations Below  Physical/Pain management (Scale of 1-10):    Pre-session rating ___________    Post-session rating __________  [  ] Increased relaxation   [  ] Affected breathing patterns  [  ] Decreased muscle tension   [  ] Decreased agitation  [  ] Affected heart rate    [  ] Increased alertness     Emotional/Psychological:  [  ] Increased self-expression   [  ] Decreased aggressive behavior   [  ] Decreased feelings of stress  [  ] Discussed healthy coping skills     [  ] Improved mood    [  ] Decreased withdrawn behavior     Social:  [  ] Decreased feelings of isolation/loneliness [  ] Positive social interaction   [x] Provided support and/or comfort for family/friends    Spiritual:  [  ] Spiritual support    [  ] Expressed peace  [  ] Expressed mai    [  ] Discussed beliefs    Techniques Utilized (Check all that apply): N/A: Please See Session Observations Below  [  ] Procedural support MT [  ] Music for relaxation [  ] Patient preferred music  [  ] Ani analysis  [  ] Cathy Failing choice  [  ] Music for validation  [  ] Entrainment  [  ] Movement to music [  ] Guided visualization  [  ] Susi Chance  [  ] Patient instrument playing [  ] Cathy Failing writing  [  ] Atiya Shen along   [  ] Cephus Severs  [  ] Sensory stimulation  [  ] Active Listening  [  ] Music for spiritual support [  ] Making of CDs as gifts    Session Observations:  Referred by Purnima Rayo RN. Patient (pt) was lying in bed, appearing minimally responsive, with family members at bedside. This music Therapist eligible (MTE) introduced self and asked the family how they were doing. They responded to this, saying they were doing well, but collectively declined music therapy at this time. MTE wanted to respect the family's wishes and expressed understanding. MTE exited at this time.      Mg Rodríguez, Music Therapist Eligible   Spiritual Care Department

## 2021-07-01 NOTE — [facility] IDG NURSE NOTES
Patient: Merlin Jay III    Date: 07/01/21  Time: 9:38 AM    [facility] Nurse Notes  Admitted for met. Breast ca on high flow oxygen. Slow wean off hi-mara to nasal cannula. Now on nasal cannula  On scheduled morphine  Ativan titrated for better symptom relief  No longer responsive or tolerating PO medications    Family present and supportive, sons had been living out of [place] and [place] until patient's cancer diagnosis.  Son, Candi Kingston living w/ father throughout chemo process         Signed by: Stew Gomez RN

## 2021-07-01 NOTE — HOSPICE
[facility]  [phone]    Discharge/Death Nursing Note   Patient Name: Jareth Rocha III  YOB: 1932  Age: 80 y.o. Date of Death: 21  Admitted Date: 2021  Time of Death: Faby Kaur 35 of Care: [facility]  Level of Care: [facility]  Patient Room: [address] [place] [address] Attending: Rashmi Sharma MD  Hospice Diagnosis: Metastatic breast cancer [facility]) [C50.919]    Death Pronouncement   Pronouncement of death completed by: Army Mars RN    Agency staff Ruben Jose NOT present at the time of death    At the time of death the patient was documented as:  Pt noted to be: Without responsive to voice or touch  Without spontaneous pulse or respiration after one minute of auscultation  Pupils fixed and dilated  TOD: 1817        The pt  within [facility]    The following were notified of the patient's death:  Son and patient's sister at bedside    Medications were disposed of per facility protocol     Discharge Summary   Discharge Reason: Death    Summary of Care Provided: inpatient hospice care to ensure comfort at end of life    [x] Post mortem care provided by floor nurse  [x] Notification of  home by nursing supervisor  [] Referrals/Community resources provided:   [] Goals completed  [] Durable Medical Equipment vendor notified     Disciplines involved: [x] RN [x] SW [x]  [] ANTONY [] Vol [] PT [] OT [] ST [] [facility]    [x] IDT communication/notification    Attending Physician, Dr. Sury Lindsey, notified of death    Bereaved   Son, Ivory Miles, low risk

## 2021-07-01 NOTE — PROGRESS NOTES
[facility]   [facility]  [phone]    Patient Name: Tom Woodall III  YOB: 1932    Date of Provider Hospice Visit: 07/01/21    Level of Care:   [x] General Inpatient (GIP)    [] Routine   [] Respite    Current Location of Care:  [] [facility] [] [facility] [] [facility] [] [facility] [] [facility], patient referred from:  [] [facility] [] [facility] [] [facility] [] [facility] [] Home [] Other:     Date of Original Hospice Admission: 6/29/2021  Hospice Medical Director at time of admission: Addi Sheikh MD    Principle Hospice Diagnosis: Metastatic breast cancer  Diagnoses RELATED to the terminal prognosis:   Other Diagnoses:   Afig, GERD, recurrent pleural effusion, history of bladder cancer     HOSPICE SUMMARY     Tom Woodall III is a 80y.o. year old who was admitted to [facility]. He has metastatic breast cancer for which he has undergone multiple lines of therapy. He reportedly most recently received abemaciclib but was unable to tolerate it. He has had progressing shortness of breath for which he was admitted to [address] [place] [address] on 6/25/21. He was admitted and treated for pneumonia. 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 His COVID-19 tests returned negative. He has not improve significantly despite treatment and required HFNC. He is no longer a candidate for chemotherapy. The patient and family chose to pursue comfort measures with the support of Hospice. He is being admitted to inpatient hospice for further management of acute symptoms as his HFNC is being weaned. At time of my exam on 6/29, he has PPS 20-30% with possible prognosis in the range of hours to days as his HFNC is weaned. Objective information:   6/25/2021 CTA Chest:  IMPRESSION  1. No evidence of pulmonary embolus. 2.  Interval development of severe multilobar airspace disease. Appearance  suggests COVID 19 pneumonia.   3. Chronic large complex left pleural effusion. 4. Chronic mild mediastinal lymphadenopathy. 5. Chronic cardia megaly. HOSPICE ASSESSMENT     Active Symptoms and Issues:  -Labored breathing and tachypnea  -Anxiety/agitation/restlessness  -Generalized pain  -Decreased responsiveness  -Hospice care     PLAN     1. GIP level of care needed for symptoms necessitating frequent skilled nursing assessment and administration of routine parenteral medications for maintenance of comfort. 2. Patient is unresponsive this morning with death appearing more imminent. Symptoms are at high risk of worsening if we change his current regimen or move him, so would recommend continuing current parenteral medications for now. 3. For pain and labored breathing we will continue morphine 2 mg IV every 4 hours routinely and prn pain, air hunger. 4. For anxiety and labored breathing we will continue ativan1 mg IV every 4 hours routinely and prn anxiety, agitation, restlessness. 5. We will have glycopyrrolate 0.2 mg IV available prn to help prevent formation of new secretions. Recommend recovery positioning for drainage of current secretions. Gentle anterior suction of secretions okay (i.e. suction around lips/teeth). Recommend avoiding deep suction to prevent irritation, pain, bleeding. 6. We will have bisacodyl available prn constipation. 7. We will have toradol available prn fever. Recommend utilizing cool washcloth on forehead and recommend against using ice packs. 8.  and SW to support family needs. 9. Disposition: anticipate that pt will decline and die in the coming hours-days without stabilizing enough for care in the home setting  10. Hospice plan of care discussed with hospice team and patient's son at bedside.     Prognosis estimated based on today's clinical assessment is:   [x] Hours to Days    [] Days to Weeks    [] Other:     GOALS OF CARE     Patient/Medical POA stated Goal of Care: optimize patient comfort    [] I have reviewed and/or updated ACP information in the Advance Care Planning Navigator. This information is available in the [address] link in the patient's chart header. Primary Medical Decision Maker:   Primary Decision Maker: Kelly Willson - Son - [phone]    Secondary Decision Maker: Prem Martinez - Daughter-in-Law - [phone]    Resuscitation Status: DNR  If DNR is there a Durable DNR on file? : [] Yes [x] No (If no, complete Durable DNR)--completed at time of hospice admission    HISTORY     History obtained from: chart, hospice team, patient    CHIEF COMPLAINT: anxiety \"can't keep my air\"  The patient is:   [x] Verbal  [] Nonverbal  [] Unresponsive    HPI/SUBJECTIVE:    6/29: patient awake and able to speak in short sentences. He is on HFNC. Appears to have labored breathing and anxiety, which he endorses. Endorses restlessness. States that he can't keep his air. His goal is for comfort care with hospice. He is amenable to receiving comfort medications. States the ativan he received last night helped him to sleep. When asked about pain, he states he has general pain all over. He thinks he had a small BM today. He has had some sips/small bites. 6/30: patient resting but wakes to name. Does endorse anxiety and labored breathing. He does find comfort medications helpful. States he is happy to be resting well. His son is at bedside and is pleased he is more comfortable. States his dad says he is ready to die. Son believes time may be short and has called his brother to let him know. Son would like his dad to stay at [facility] [place] since he is comfortable here. 7/1: patient unresponsive this morning. Son stayed overnight and says his dad hasn't been alert since changing rooms after being weaned from the [address]. No prns received overnight.      REVIEW OF SYSTEMS     The following systems were: [] reviewed  [x] unable to be reviewed as patient is unresponsive    Positive ROS include bold items:  Constitutional: fatigue, weakness, in pain, short of breath  Ears/nose/mouth/throat: increased airway secretions  Respiratory:shortness of breath, wheezing  Gastrointestinal:poor appetite, nausea, vomiting, abdominal pain, constipation, diarrhea  Musculoskeletal:pain, deformities, swelling legs  Neurologic:confusion, hallucinations, weakness  Psychiatric:anxiety, feeling depressed, poor sleep  Endocrine: increased thirst, increased hunger    Adult Non-Verbal Pain Assessment Score: 0    Face  [x] 0   No particular expression or smile  [] 1   Occasional grimace, tearing, frowning, wrinkled forehead  [] 2   Frequent grimace, tearing, frowning, wrinkled forehead    Activity (movement)  [x] 0   Lying quietly, normal position  [] 1   Seeking attention through movement or slow, cautious movement  [] 2   Restless, excessive activity and/or withdrawal reflexes    Guarding  [x] 0   Lying quietly, no positioning of hands over areas of body  [] 1   Splinting areas of the body, tense  [] 2   Rigid, stiff    Physiology (vital signs)  [x] 0   Stable vital signs  [] 1   Change in any of the following: SBP > 20mm Hg; HR > 20/minute  [] 2   Change in any of the following: SBP > 30mm Hg; HR > 25/minute    Respiratory  [x] 0   Baseline RR/SpO2, compliant with ventilator  [] 1   RR > 10 above baseline, or 5% drop SpO2, mild asynchrony with ventilator  [] 2   RR > 20 above baseline, or 10% drop SpO2, asynchrony with ventilator     FUNCTIONAL ASSESSMENT     Palliative Performance Scale (PPS): 10       PSYCHOSOCIAL/SPIRITUAL ASSESSMENT     Active Problems:    Metastatic [facility] cancer ([facility] (6/29/2021)      Past Medical History:   Diagnosis Date    Atrial fibrillation ([facility]     Bladder cancer ([facility]     Breast cancer ([facility] 07/26/2011    Breast cancer, male ([facility] 07/05/2012    Colon polyps 7/26/2011    Glucose intolerance (impaired glucose tolerance) 10/31/2013    Prostate cancer ([facility] 07/26/2011    Typhoid fever 7/26/2011      Past Surgical History:   Procedure Laterality Date    HX CATARACT REMOVAL      HX MASTECTOMY      HX POLYPECTOMY      HX PROSTATECTOMY      HX TONSILLECTOMY      IR THORACENTESIS CATH W IMAGE  11/8/2019      Social History     Tobacco Use    Smoking status: Former Smoker     Packs/day: 2.00    Smokeless tobacco: Never Used   Substance Use Topics    Alcohol use: Yes     Alcohol/week: 5.8 standard drinks     Types: 7 Standard drinks or equivalent per week     Comment: A drink at night     No family history on file.    No Known Allergies   Current Facility-Administered Medications   Medication Dose Route Frequency    albuterol (PROVENTIL VENTOLIN) nebulizer solution 2.5 mg  2.5 mg Nebulization Q4H PRN    ondansetron (ZOFRAN) injection 4 mg  4 mg IntraVENous Q4H PRN    LORazepam (ATIVAN) injection 1 mg  1 mg IntraVENous Q4H    LORazepam (ATIVAN) injection 1 mg  1 mg IntraVENous Q30MIN PRN    balsam peru-castor oiL (VENELEX) ointment   Topical TID    saline peripheral flush soln 5 mL  5 mL InterCATHeter PRN    LORazepam (ATIVAN) injection 1 mg  1 mg IntraVENous Q15MIN PRN    acetaminophen (TYLENOL) tablet 650 mg  650 mg Oral Q4H PRN    glycopyrrolate (ROBINUL) injection 0.2 mg  0.2 mg IntraVENous Q4H PRN    senna-docusate (PERICOLACE) 8.6-50 mg per tablet 2 Tablet  2 Tablet Oral BID PRN    bisacodyL (DULCOLAX) suppository 10 mg  10 mg Rectal DAILY PRN    bacitracin 500 unit/gram packet 1 Packet  1 Packet Topical BID    prochlorperazine (COMPAZINE) with saline injection 5 mg  5 mg IntraVENous Q4H PRN    morphine injection 2 mg  2 mg IntraVENous Q4H    morphine injection 2 mg  2 mg IntraVENous Q30MIN PRN        PHYSICAL EXAM     Wt Readings from Last 3 Encounters:   06/29/21 65 kg (143 lb 4.8 oz)   06/25/21 65 kg (143 lb 3.2 oz)   05/18/21 68 kg (150 lb)       Visit Vitals  /71 (BP 1 Location: Right upper arm, BP Patient Position: At rest)   Pulse 96   Temp 98.3 °F (36.8 °C)   Resp 14   Ht 5' 9\" (1.753 m)   Wt 65 kg (143 lb 4.8 oz)   SpO2 (!) 82%   BMI 21.16 kg/m²       Supplemental O2  [x] Yes  [] NO  Last bowel movement: today per patient    Currently this patient has:  [] Peripheral IV [] PICC  [] PORT [] ICD    [] Smith Catheter [] NG Tube   [] PEG Tube    [] Rectal Tube [] Drain  [] Other:     Constitutional: lying abed, elderly, frail, not awake  Eyes: lids normal, no drainage  ENMT: some dryness of mm, no exudate, nares normal  Cardiovascular: tachycardia, radial pulses difficult to palpate, no LE edema  Respiratory: rate teens-20 with decreased accessory muscle use and more relaxed breathing compared to previous days  Gastrointestinal: soft, NT  Musculoskeletal: no gross deformity or TTP  Skin: warm, dry, no mottling  Neurologic: he does not wake or stir to exam  Psychiatric: unresponsive, not agitated      Pertinent Lab and or Imaging Tests:  Lab Results   Component Value Date/Time    Sodium 138 06/28/2021 03:17 AM    Potassium 3.6 06/28/2021 03:17 AM    Chloride 101 06/28/2021 03:17 AM    CO2 33 (H) 06/28/2021 03:17 AM    Anion gap 4 (L) 06/28/2021 03:17 AM    Glucose 116 (H) 06/28/2021 03:17 AM    BUN 23 (H) 06/28/2021 03:17 AM    Creatinine 0.79 06/28/2021 03:17 AM    BUN/Creatinine ratio 29 (H) 06/28/2021 03:17 AM    GFR est AA >60 06/28/2021 03:17 AM    GFR est non-AA >60 06/28/2021 03:17 AM    Calcium 8.7 06/28/2021 03:17 AM     Lab Results   Component Value Date/Time    Protein, total 6.5 06/26/2021 04:18 AM    Albumin 2.8 (L) 06/26/2021 04:18 AM           Estefanía Ramos MD  [address] [place] [address]

## 2021-07-01 NOTE — PROGRESS NOTES
[facility] LCSW note: This LCSW and Tyler Mejia RN visited the room of pt who is now unresponsive. Son Marlene Dietrich was present. LCSW provided support for Marlene Dietrich as we talked about pts wish for 24 hour care at EOL and the wish was granted by remaining at [address] [place] [address]. LCSW  affirmed meaningful conversation pt and son have had over the past few days. LCSW validated sons feelings re pts comfort and not wanting to linger. LCSW provided emotional support for son in coping with pts EOL. LCSW made referral to [facility] for pts sister from [place]. LCSW will continue to assess and monitor pt and family needs.

## 2021-07-01 NOTE — PROGRESS NOTES
DEATH NOTE:    Called by nursing staff to pronounce patient's death. - Patient unresponsive to verbal, tactile, and noxious stimuli.  - No spontaneous respirations. - Absent heart tones. - Pulseless. - Pupils fixed and dilated. Patient pronounced dead at 18:30 on 07/01/21.

## 2021-07-01 NOTE — [facility] IDG OTHER NOTES
During the interdisciplinary group meeting on 7/1/2021, the primary care team reviewed the patient's admission, and bereavement was discussed. It was confirmed that initial assessment had been completed for patient's stepson but after discussion it was changed to son Vero Mckeon, who had been living in [facility] but has lived with the patient for his chemotherapy treatments. He was tearful at times.

## 2021-07-01 NOTE — HOSPICE
[facility]  [phone]    GIP Daily Nursing Note   Patient Name: Lisseth Ramírez III  YOB: 1932  Age: 80 y.o. Date of Visit: 07/01/21  Facility of Care: [facility]  Patient Room: [facility] Attending: Esme Viramontes MD  Hospice Diagnosis: Metastatic breast cancer [facility]) [C50.919]    Level of Care: GIP    Current GIP Symptoms    1. Dyspnea   2. Anxiety          ASSESSMENT & PLAN     ASSESSMENT  Patient unresponsive, appears comfortable on current med regime. Oxygen will be decreased to from 5L to 3L now and if comfortable in a couple of hours will remove oxygen. Son, Zoraida Rehman, at bedside. Joint visit with Dr Breana Johnston and Orren Buerger. Son very appreciative of the care provided  Sammy Leventhal arrived at end of visit and will spend some time with son offering emotional and spiritual support . Visit Vitals  /71 (BP 1 Location: Right upper arm, BP Patient Position: At rest)   Pulse 96   Temp 98.3 °F (36.8 °C)   Resp 14   Ht 5' 9\" (1.753 m)   Wt 65 kg (143 lb 4.8 oz)   SpO2 (!) 82%   BMI 21.16 kg/m²      Plan of Care:     1. GIP level of care needed for symptoms necessitating frequent skilled nursing assessment and administration of parenteral  medications. Needs monitoring for need to titrate sx mgt regimen for optimization of comfort. 2. Pt is at high risk of rapid decline and death due to terminal disease process  3. Provide education and support to unit staff caring for hospice patient and family regarding end of life care and Hospice plan of care.  Provide staff with direct contact information to reach hospice team [phone]   4. Provide support and frequent rounds for patient comfort and safety ongoing  5. Provide  support ongoing, continue to discuss discharge plan if patient becomes payal and does not require acute nursing care interventions for GIP level of care  6. Provide  and bereavement support ongoing  7.  Plan to titrate oxygen from High Flow to Mid Flow NC. Once successfully titrated to mid-flow, plan to move patient from 4th floor to [facility]. P  8. Schedule Morphine 2mg IV every 4 hours with Lorazepam 0.5mg IV every 4 hours. 10. Add PRN doses of IV Morphine 2mg every 30 minutes and IV lorazepam 0.5mg every 3 minutes PRN Compazine and IV Zofran available for nausea. Pt states he feels constipated. Scheduled Senna 2 tabs BID with suppository PRN  11. Provide emotional support and updates to son, Sergey hJaveri and patient's sister who will visit today             Spiritual Interventions: Continue hospice chaplain support ongoing    Psych/ Social/ Emotional Interventions: Continue with hospice social worker support ongoing. Validate patient anxiety and fears over his respiratory distress. Care Coordination Needs: Continue to coordinate hospice plan of care with patient/family and [address] [place] [address] nursing staff. Educated to not use cardiac monitor, and not to increase high flow oxygen. Care plan and New Orders discussed / approved with Mandeep Ellis MD.    Description History and Chart Review     Narrative History of last 24 hours that demonstrates care cannot be provided in another setting:  Scheduled IV Morphine and lorazepam for respiratory distress, anxiety and pain. PRN use dosing. High Flow oxygen. What has been done to control the patient's symptoms in the last 24 hours? Scheduled dosing of IV symptom medications, High Flow oxygen    Does the patient currently require IV medications? Yes  Does the patient currently require scheduled medications? Yes  Does the patient currently require a PCA?  No    List number of doses of PRN medications in last 24 hours:  Medication 1:  Number of doses:    Medication 2:   Number of doses:    Medication 3:   Number of doses:    Supporting documentation for GIP need for pain control:  [x] Frequent evaluation by a doctor, nurse practitioner, nurse   [] Frequent medication adjustment    [x] IVs that cannot be administered at home   [] Aggressive pain management   [] Complicated technical delivery of medications              Supporting documentation for GIP need for symptom control:  []  Sudden decline necessitating intensive nursing intervention  [x]  Intermittent / intractable nausea or vomiting   []  Pathological fractures  []  Advanced open wounds requiring frequent skilled care  [x] Unmanageable respiratory distress  [] New or worsening delirium   [] Delirium with behavior issues: Is 24 hour caregiver present due to safety concerns with agitation? (yes/no)  [] Imminent death  with skilled nursing needs documented above    DISCHARGE PLANNING   1. Discharge Plan: If patient stabilizes, and if his PCR COVID results are negative, family would like for patient to be transferred to [facility]. Once stable at [facility], family plans to have pt return to his own home with private 24 hour caregivers. 2. Patient/Family teaching: Pt and son's, Nallely Birch, Branson Osler  3. Response to patient/family teaching: Family state agreement with hospice plan of care    ASSESSMENT    KARNOFSKY: 30    Prognosis estimated based on 07/01/21 clinical assessment is:     [x] Few to Many Days       Quality Measure: Patient self-reports:  [x] Yes    [] No    ESAS:   Time of Assessment: 08:30  Pain (1-10): 2  Fatigue (1-10): 5  Shortness of breath (1-10):5  Nausea (1-10): 2  Appetite (1-10):    Anxiety: (1-10):  7  Depression: (1-10):   Well-being: (1-10):   Constipation: _ Yes  _ No  LAST BM: 6/29/2021    CLINICAL INFORMATION     Patient Vitals for the past 12 hrs:   Temp Pulse Resp BP SpO2   07/01/21 0921 98.3 °F (36.8 °C) 96 14 117/71 (!) 82 %       Currently this patient has:  [x] Supplemental O2 High FLow  [x] IV    [] PICC      [] PORT   [] NG Tube    [] PEG Tube   [] Ostomy     [] Smith draining _______ urine  [] Other:     SIGNS/PHYSICAL FINDINGS     Skin (including wound):  [] Warm, dry, supple, intact and color normal for race  [x] Warm   [] Dry   [] Cool     [x] Clammy       [] Diaphoretic    Turgor   [x] Normal   [] Decreased  Color:   [] Pink   [] Pale   [] Cyanotic   [] Erythema   [] Jaundice   [x] Normal for Race  [x]  Wounds: Scrotum, penis- current wound care orders    Neuro:  [x] Lethargy  [] Restlessness / agitation  [] Confusion / delirium  [] Hallucinations  [] Responds to maximal stimulation  [] Unresponsive  [] Seizures     Cardiac:  [x] Dyspnea on Exertion  [] JVD  [] Murmur  [] Palpitations  [] Hypotension  [] Hypertension  [x] Tachycardia  [] Bradycardia  [x] Irregular HR  [] Pulses Decreased  [] Pulses Absent  [] Edema:       (Location, Grade and Pitting)  [] Mottling:      (Location)    Respiratory:  Breath sounds:    [x] Diminished/ tight   [] Wheeze   [] Rhonchi   [] Rales   [x] Even and labored  [] Labored:            [] Cough   [] Non Productive   [] Productive    [] Description:           [] Deep suctioned   [x] O2 at __50_ LPM  [x] High flow oxygen greater than 10 LPM  [] Bi-Pap    GI  [x] Abdomen softly distinded. [] Ascites  [] Nausea  [] Vomiting  [] Incontinent of bowels  [] Bowel sounds (yes/no)  [] Diarrhea  [] Constipation (see above including last bowel movement)  [] Checked for impaction  [x] Last BM 6/29/2021    Nutrition  Diet:___Poor_______  Appetite:   [] Good   [] Fair   [] Poor   [] Tube Feeding       [x] Voiding  [] Incontinent   [] Smith    Musculoskeletal  [] Balance/[place] Unsteady   [x] Weak   Strength:    [] Normal    [] Limited    [x] Decreasing   Activities:    [] Up as tolerated   [] Bedridden    [x] Specify: currently in the bed; with max assist could sit in chair. SAFETY  [] 24 hr. Caregiver   [x] Side rails ?     [x] Hospital bed   [x] Reviewed Falls & Safety       ALLERGIES AND MEDICATIONS     Allergies: No Known Allergies    Current Facility-Administered Medications   Medication Dose Route Frequency    albuterol (PROVENTIL VENTOLIN) nebulizer solution 2.5 mg  2.5 mg Nebulization Q4H PRN  ondansetron (ZOFRAN) injection 4 mg  4 mg IntraVENous Q4H PRN    LORazepam (ATIVAN) injection 1 mg  1 mg IntraVENous Q4H    LORazepam (ATIVAN) injection 1 mg  1 mg IntraVENous Q30MIN PRN    balsam peru-castor oiL (VENELEX) ointment   Topical TID    saline peripheral flush soln 5 mL  5 mL InterCATHeter PRN    LORazepam (ATIVAN) injection 1 mg  1 mg IntraVENous Q15MIN PRN    acetaminophen (TYLENOL) tablet 650 mg  650 mg Oral Q4H PRN    glycopyrrolate (ROBINUL) injection 0.2 mg  0.2 mg IntraVENous Q4H PRN    senna-docusate (PERICOLACE) 8.6-50 mg per tablet 2 Tablet  2 Tablet Oral BID PRN    bisacodyL (DULCOLAX) suppository 10 mg  10 mg Rectal DAILY PRN    bacitracin 500 unit/gram packet 1 Packet  1 Packet Topical BID    prochlorperazine (COMPAZINE) with saline injection 5 mg  5 mg IntraVENous Q4H PRN    morphine injection 2 mg  2 mg IntraVENous Q4H    morphine injection 2 mg  2 mg IntraVENous Q30MIN PRN          Visit Time In: 08:30  Visit Time Out: 10:00    Anders Smith RN, [place] [address] Nurse Liaison  [phone] Mobile  [phone] Office  Available on [employer]

## 2021-07-01 NOTE — PROGRESS NOTES
Initial visit. Pt sleeping comfortably and did not respond during length of visit. Son Baby Leonardo at bedside. Introduced self and role and established rapport with Flynn Patterson who appeared calm and self-reported to be coping well. Elsa auguste has visited pt and Flynn Patterson is feeling at peace and pleased with Hospice care. Shared life review.  advised of availability and extended blessing to complete visit. Chaplains are available for support as desired by son and family who will be visiting. Signed by: Jayy Rios

## 2021-07-01 NOTE — [facility] IDG CHAPLAIN NOTES
Patient: Merlin Jay III    Date: 07/01/21  Time: 10:28 AM    [facility]  Notes  Initial visit. Pt sleeping comfortably and did not respond during length of visit. Son Candi Kingston at bedside. Interventions: Introduced self and role and established rapport with Canid Kingston who appeared calm and self-reported to be coping well. Dariel auguste has visited pt and Candi Kingston is feeling at peace and pleased with Hospice care. Shared life review.  advised of availability and extended blessing to complete visit. Goal for next two weeks: Chaplains are available for support as desired by son and family who will be visiting. Signed by: Patsy Faustin

## 2021-07-02 NOTE — HOSPICE
[address] [facility] LCSW Bereavement/Condolence Call: This LCSW called pts son Abdelrahman Lawton to offer condolences and support. LCSW left vm to call me with any needs.        [address]    [phone]

## 2021-07-06 NOTE — DISCHARGE SUMMARY
Discharge Summary    [facility]  [facility]  [phone]      Date of Admission: 6/29/2021  Date of Discharge: 7/1/2021    Trina Perla is a 80y.o. year old who was admitted to [facility] at [facility] with a Hospice diagnosis of Metastatic breast cancer ([address] Denzel Perrin. Pt was admitted for [facility] level care. Per HPI:  Paz Perez III is a 80y.o. year old who was admitted to [facility]. He has metastatic breast cancer for which he has undergone multiple lines of therapy. He reportedly most recently received abemaciclib but was unable to tolerate it. He has had progressing shortness of breath for which he was admitted to [facility] on 6/25/21. He was admitted and treated for pneumonia. 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 His COVID-19 tests returned negative. He has not improve significantly despite treatment and required HFNC. He is no longer a candidate for chemotherapy. The patient and family chose to pursue comfort measures with the support of Hospice. He is being admitted to inpatient hospice for further management of acute symptoms as his HFNC is being weaned. At time of my exam on 6/29, he has PPS 20-30% with possible prognosis in the range of hours to days as his HFNC is weaned. \"    The patient's care was focused on comfort, and the patient passed away on 7/1/2021.     Patricia Aguirre MD

## 2024-08-20 NOTE — MR AVS SNAPSHOT
Monitored by specialist- no acute findings meriting change in the plan   Visit Information Date & Time Provider Department Dept. Phone Encounter #  
 1/23/2017  2:20 PM Erickson Tripathi MD [facility] [phone] [id number] Upcoming Health Maintenance Date Due DTaP/Tdap/Td series (1 - Tdap) 6/5/1953 GLAUCOMA SCREENING Q2Y 6/5/1997 MEDICARE YEARLY EXAM 11/6/2015 Allergies as of 1/23/2017  Review Complete On: 1/23/2017 By: Regina Nice RN No Known Allergies Current Immunizations  Reviewed on 11/16/2016 Name Date Influenza High Dose Vaccine PF 11/3/2016 Influenza Vaccine 10/7/2013 Influenza Vaccine Split 10/7/2011 Pneumococcal Conjugate (PCV-13) 11/27/2015 Pneumococcal Vaccine (Unspecified Type) 11/13/2003, 4/29/1998 Not reviewed this visit You Were Diagnosed With   
  
 Codes Comments Chronic atrial fibrillation (HCC)    -  Primary ICD-10-CM: S49.4 ICD-9-CM: 427.31 Vitals BP Pulse Resp Height(growth percentile) Weight(growth percentile) SpO2  
 140/84 (BP 1 Location: Right arm, BP Patient Position: Sitting) 62 16 5' 8.5\" (1.74 m) 177 lb 3.2 oz (80.4 kg) 96% BMI Smoking Status 26.55 kg/m2 Former Smoker BMI and BSA Data Body Mass Index Body Surface Area  
 26.55 kg/m 2 1.97 m 2 Preferred Pharmacy Pharmacy Name Phone [facility], [address] [place] [address] Road [phone] Your Updated Medication List  
  
   
This list is accurate as of: 1/23/17  3:04 PM.  Always use your most recent med list.  
  
  
  
  
 digoxin 0.25 mg tablet Commonly known as:  LANOXIN  
TAKE 1 TABLET BY MOUTH EVERY DAY  
  
 ELIQUIS 5 mg tablet Generic drug:  apixaban Take 5 mg by mouth two (2) times a day. MULTIPLE VITAMINS PO Take  by mouth. tamoxifen 20 mg tablet Commonly known as:  NOLVADEX Take 20 mg by mouth daily. VITAMIN B-12 PO Take  by mouth daily. VITAMIN D3 1,000 unit tablet Generic drug:  cholecalciferol Take 400 Units by mouth daily. We Performed the Following AMB POC EKG ROUTINE W/ 12 LEADS, INTER & REP [36568 CPT(R)] DIGOXIN, RANDOM, SERUM [id number] CPT(R)] METABOLIC PANEL, BASIC [23633 CPT(R)] Patient Instructions Have blood work done tomorrow Follow up with Lui Andrew in 6 months Introducing [facility]! Marisela Delgadillo introduces [employer] patient portal. Now you can access parts of your medical record, email your doctor's office, and request medication refills online. 1. In your internet browser, go to [email or web address] 2. Click on the First Time User? Click Here link in the Sign In box. You will see the New Member Sign Up page. 3. Enter your [employer] Access Code exactly as it appears below. You will not need to use this code after youve completed the sign-up process. If you do not sign up before the expiration date, you must request a new code. · [employer] Access Code: [email or web address] Expires: 2/1/2017 11:26 AM 
 
4. Enter the last four digits of your Social Security Number (xxxx) and Date of Birth (mm/dd/yyyy) as indicated and click Submit. You will be taken to the next sign-up page. 5. Create a [employer] ID. This will be your [employer] login ID and cannot be changed, so think of one that is secure and easy to remember. 6. Create a [employer] password. You can change your password at any time. 7. Enter your Password Reset Question and Answer. This can be used at a later time if you forget your password. 8. Enter your e-mail address. You will receive e-mail notification when new information is available in [address] 9. Click Sign Up. You can now view and download portions of your medical record. 10. Click the Download Summary menu link to download a portable copy of your medical information.  
 
If you have questions, please visit the Frequently Asked Questions section of the [employer]. Remember, [employer] is NOT to be used for urgent needs. For medical emergencies, dial 911. Now available from your iPhone and Android! Please provide this summary of care documentation to your next provider. Your primary care clinician is listed as Shree Garza. If you have any questions after today's visit, please call [phone].